# Patient Record
Sex: MALE | Race: WHITE | NOT HISPANIC OR LATINO | Employment: OTHER | URBAN - METROPOLITAN AREA
[De-identification: names, ages, dates, MRNs, and addresses within clinical notes are randomized per-mention and may not be internally consistent; named-entity substitution may affect disease eponyms.]

---

## 2018-12-05 ENCOUNTER — OFFICE VISIT CONVERTED (OUTPATIENT)
Dept: PODIATRY | Facility: CLINIC | Age: 78
End: 2018-12-05
Attending: PODIATRIST

## 2020-04-17 ENCOUNTER — HOSPITAL ENCOUNTER (OUTPATIENT)
Dept: OTHER | Facility: HOSPITAL | Age: 80
Discharge: HOME | End: 2020-04-17
Attending: OTOLARYNGOLOGY

## 2020-04-17 LAB
ANION GAP SERPL CALC-SCNC: 19 MMOL/L (ref 8–19)
APTT BLD: 24.6 S (ref 22.2–34.2)
BASOPHILS # BLD AUTO: 0.08 10*3/UL (ref 0–0.2)
BASOPHILS NFR BLD AUTO: 0.9 % (ref 0–3)
BUN SERPL-MCNC: 30 MG/DL (ref 5–25)
BUN/CREAT SERPL: 19 {RATIO} (ref 6–20)
CALCIUM SERPL-MCNC: 9.6 MG/DL (ref 8.7–10.4)
CHLORIDE SERPL-SCNC: 97 MMOL/L (ref 99–111)
CONV ABS IMM GRAN: 0.03 10*3/UL (ref 0–0.2)
CONV CO2: 27 MMOL/L (ref 22–32)
CONV IMMATURE GRAN: 0.3 % (ref 0–1.8)
CREAT UR-MCNC: 1.6 MG/DL (ref 0.7–1.2)
DEPRECATED RDW RBC AUTO: 54.2 FL (ref 35.1–43.9)
EOSINOPHIL # BLD AUTO: 0.43 10*3/UL (ref 0–0.7)
EOSINOPHIL # BLD AUTO: 4.8 % (ref 0–7)
ERYTHROCYTE [DISTWIDTH] IN BLOOD BY AUTOMATED COUNT: 15.3 % (ref 11.6–14.4)
GFR SERPLBLD BASED ON 1.73 SQ M-ARVRAT: 41 ML/MIN/{1.73_M2}
GLUCOSE SERPL-MCNC: 120 MG/DL (ref 70–99)
HCT VFR BLD AUTO: 45.4 % (ref 42–52)
HGB BLD-MCNC: 14.7 G/DL (ref 14–18)
INR PPP: 0.94 (ref 2–3)
LYMPHOCYTES # BLD AUTO: 1.65 10*3/UL (ref 1–5)
LYMPHOCYTES NFR BLD AUTO: 18.6 % (ref 20–45)
MCH RBC QN AUTO: 31.1 PG (ref 27–31)
MCHC RBC AUTO-ENTMCNC: 32.4 G/DL (ref 33–37)
MCV RBC AUTO: 96.2 FL (ref 80–96)
MONOCYTES # BLD AUTO: 0.84 10*3/UL (ref 0.2–1.2)
MONOCYTES NFR BLD AUTO: 9.5 % (ref 3–10)
NEUTROPHILS # BLD AUTO: 5.84 10*3/UL (ref 2–8)
NEUTROPHILS NFR BLD AUTO: 65.9 % (ref 30–85)
NRBC CBCN: 0 % (ref 0–0.7)
OSMOLALITY SERPL CALC.SUM OF ELEC: 293 MOSM/KG (ref 273–304)
PLATELET # BLD AUTO: 153 10*3/UL (ref 130–400)
PMV BLD AUTO: 10.1 FL (ref 9.4–12.4)
POTASSIUM SERPL-SCNC: 4.6 MMOL/L (ref 3.5–5.3)
PROTHROMBIN TIME: 10.2 S (ref 9.4–12)
RBC # BLD AUTO: 4.72 10*6/UL (ref 4.7–6.1)
SODIUM SERPL-SCNC: 138 MMOL/L (ref 135–147)
T4 FREE SERPL-MCNC: 1.6 NG/DL (ref 0.9–1.8)
TSH SERPL-ACNC: 0.35 M[IU]/L (ref 0.27–4.2)
WBC # BLD AUTO: 8.87 10*3/UL (ref 4.8–10.8)

## 2020-04-24 ENCOUNTER — HOSPITAL ENCOUNTER (OUTPATIENT)
Dept: PERIOP | Facility: HOSPITAL | Age: 80
Setting detail: HOSPITAL OUTPATIENT SURGERY
Discharge: HOME | End: 2020-04-24
Attending: OTOLARYNGOLOGY

## 2020-04-24 LAB
GLUCOSE BLD-MCNC: 107 MG/DL (ref 70–99)
GLUCOSE BLD-MCNC: 97 MG/DL (ref 70–99)

## 2021-01-01 ENCOUNTER — HOSPITAL ENCOUNTER (OUTPATIENT)
Dept: CARDIOLOGY | Facility: HOSPITAL | Age: 81
Discharge: HOME | End: 2021-10-13
Admitting: INTERNAL MEDICINE

## 2021-01-01 ENCOUNTER — OFFICE VISIT (OUTPATIENT)
Dept: INTERNAL MEDICINE | Facility: CLINIC | Age: 81
End: 2021-01-01

## 2021-01-01 ENCOUNTER — CLINICAL SUPPORT (OUTPATIENT)
Dept: INTERNAL MEDICINE | Facility: CLINIC | Age: 81
End: 2021-01-01

## 2021-01-01 ENCOUNTER — TELEPHONE (OUTPATIENT)
Dept: INTERNAL MEDICINE | Facility: CLINIC | Age: 81
End: 2021-01-01

## 2021-01-01 VITALS
HEART RATE: 83 BPM | WEIGHT: 174.2 LBS | TEMPERATURE: 97.8 F | DIASTOLIC BLOOD PRESSURE: 97 MMHG | OXYGEN SATURATION: 88 % | HEIGHT: 65 IN | SYSTOLIC BLOOD PRESSURE: 150 MMHG | BODY MASS INDEX: 29.02 KG/M2

## 2021-01-01 VITALS
OXYGEN SATURATION: 89 % | WEIGHT: 179.9 LBS | BODY MASS INDEX: 28.91 KG/M2 | SYSTOLIC BLOOD PRESSURE: 156 MMHG | HEART RATE: 91 BPM | HEIGHT: 66 IN | TEMPERATURE: 98.2 F | DIASTOLIC BLOOD PRESSURE: 76 MMHG

## 2021-01-01 DIAGNOSIS — I10 ESSENTIAL HYPERTENSION: Primary | ICD-10-CM

## 2021-01-01 DIAGNOSIS — Z23 NEED FOR VACCINATION: ICD-10-CM

## 2021-01-01 DIAGNOSIS — I50.31 ACUTE DIASTOLIC CHF (CONGESTIVE HEART FAILURE) (HCC): ICD-10-CM

## 2021-01-01 DIAGNOSIS — E03.4 HYPOTHYROIDISM DUE TO ACQUIRED ATROPHY OF THYROID: ICD-10-CM

## 2021-01-01 DIAGNOSIS — I10 ESSENTIAL HYPERTENSION: ICD-10-CM

## 2021-01-01 DIAGNOSIS — N18.32 STAGE 3B CHRONIC KIDNEY DISEASE (HCC): ICD-10-CM

## 2021-01-01 DIAGNOSIS — I27.20 PULMONARY HYPERTENSION (HCC): ICD-10-CM

## 2021-01-01 DIAGNOSIS — I48.19 PERSISTENT ATRIAL FIBRILLATION (HCC): ICD-10-CM

## 2021-01-01 DIAGNOSIS — M1A.0790 IDIOPATHIC CHRONIC GOUT OF FOOT WITHOUT TOPHUS, UNSPECIFIED LATERALITY: ICD-10-CM

## 2021-01-01 DIAGNOSIS — R53.83 OTHER FATIGUE: ICD-10-CM

## 2021-01-01 DIAGNOSIS — R09.02 HYPOXIA: Primary | ICD-10-CM

## 2021-01-01 DIAGNOSIS — E78.2 MIXED HYPERLIPIDEMIA: ICD-10-CM

## 2021-01-01 DIAGNOSIS — R73.01 IFG (IMPAIRED FASTING GLUCOSE): ICD-10-CM

## 2021-01-01 DIAGNOSIS — I50.31 ACUTE DIASTOLIC CHF (CONGESTIVE HEART FAILURE) (HCC): Primary | ICD-10-CM

## 2021-01-01 LAB
BH CV ECHO MEAS - AO ROOT DIAM: 2.3 CM
BH CV ECHO MEAS - EF(MOD-BP): 56 %
BH CV ECHO MEAS - IVSD: 1 CM
BH CV ECHO MEAS - LA DIMENSION(2D): 5 CM
BH CV ECHO MEAS - LAT PEAK E' VEL: 6 CM/SEC
BH CV ECHO MEAS - LVIDD: 4.3 CM
BH CV ECHO MEAS - LVIDS: 2.5 CM
BH CV ECHO MEAS - LVPWD: 1 CM
BH CV ECHO MEAS - MED PEAK E' VEL: 8 CM/SEC
BH CV ECHO MEAS - MV A MAX VEL: 37 CM/SEC
BH CV ECHO MEAS - MV DEC TIME: 131 MSEC
BH CV ECHO MEAS - MV E MAX VEL: 87 CM/SEC
BH CV ECHO MEAS - MV E/A: 2.3
BH CV ECHO MEAS - RVSP: 63 MMHG
BH CV ECHO MEASUREMENTS AVERAGE E/E' RATIO: 12.43
IVRT: 61 MSEC
LEFT ATRIUM VOLUME INDEX: 54 ML/M2
MAXIMAL PREDICTED HEART RATE: 141 BPM
STRESS TARGET HR: 120 BPM

## 2021-01-01 PROCEDURE — 99214 OFFICE O/P EST MOD 30 MIN: CPT | Performed by: INTERNAL MEDICINE

## 2021-01-01 PROCEDURE — 93306 TTE W/DOPPLER COMPLETE: CPT

## 2021-01-01 PROCEDURE — G0008 ADMIN INFLUENZA VIRUS VAC: HCPCS | Performed by: INTERNAL MEDICINE

## 2021-01-01 PROCEDURE — 93306 TTE W/DOPPLER COMPLETE: CPT | Performed by: SPECIALIST

## 2021-01-01 PROCEDURE — 90662 IIV NO PRSV INCREASED AG IM: CPT | Performed by: INTERNAL MEDICINE

## 2021-01-01 RX ORDER — LISINOPRIL 40 MG/1
TABLET ORAL
Qty: 90 TABLET | Refills: 0 | Status: SHIPPED | OUTPATIENT
Start: 2021-01-01 | End: 2022-01-01

## 2021-01-01 RX ORDER — ALLOPURINOL 100 MG/1
TABLET ORAL
Qty: 90 TABLET | Refills: 1 | Status: SHIPPED | OUTPATIENT
Start: 2021-01-01 | End: 2022-01-01 | Stop reason: SDUPTHER

## 2021-01-01 RX ORDER — ATENOLOL 25 MG/1
TABLET ORAL
Qty: 90 TABLET | Refills: 0 | OUTPATIENT
Start: 2021-01-01

## 2021-01-01 RX ORDER — ATENOLOL 50 MG/1
50 TABLET ORAL DAILY
Qty: 90 TABLET | Refills: 1 | Status: SHIPPED | OUTPATIENT
Start: 2021-01-01 | End: 2022-01-01

## 2021-01-01 RX ORDER — FUROSEMIDE 40 MG/1
40 TABLET ORAL DAILY
Qty: 90 TABLET | Refills: 0 | Status: SHIPPED | OUTPATIENT
Start: 2021-01-01 | End: 2022-01-01

## 2021-01-01 RX ORDER — ALLOPURINOL 100 MG/1
TABLET ORAL
Qty: 90 TABLET | Refills: 0 | Status: SHIPPED | OUTPATIENT
Start: 2021-01-01 | End: 2021-01-01

## 2021-01-01 RX ORDER — SPIRONOLACTONE 25 MG/1
25 TABLET ORAL DAILY
Qty: 30 TABLET | Refills: 1 | Status: SHIPPED | OUTPATIENT
Start: 2021-01-01 | End: 2022-01-01

## 2021-01-01 RX ORDER — AMLODIPINE BESYLATE 10 MG/1
TABLET ORAL
COMMUNITY
Start: 2021-09-03 | End: 2021-01-01

## 2021-05-15 VITALS
HEART RATE: 60 BPM | HEIGHT: 65 IN | SYSTOLIC BLOOD PRESSURE: 189 MMHG | WEIGHT: 194.25 LBS | OXYGEN SATURATION: 97 % | BODY MASS INDEX: 32.36 KG/M2 | DIASTOLIC BLOOD PRESSURE: 91 MMHG

## 2021-07-20 RX ORDER — ATENOLOL 25 MG/1
TABLET ORAL
Qty: 90 TABLET | Refills: 0 | Status: SHIPPED | OUTPATIENT
Start: 2021-07-20 | End: 2021-01-01 | Stop reason: SDUPTHER

## 2021-07-20 NOTE — TELEPHONE ENCOUNTER
Please only fill medications for a maximum of 90 days.  No refills on 90-day scripts.  2 refills on monthly scripts.  Thanks

## 2021-08-30 PROBLEM — C14.0 MALIGNANT NEOPLASM OF EAR, NOSE, AND THROAT: Status: ACTIVE | Noted: 2021-08-30

## 2021-08-30 PROBLEM — E78.2 MIXED HYPERLIPIDEMIA: Status: ACTIVE | Noted: 2021-08-30

## 2021-08-30 PROBLEM — M1A.0790 IDIOPATHIC CHRONIC GOUT OF FOOT WITHOUT TOPHUS: Status: ACTIVE | Noted: 2021-08-30

## 2021-08-30 PROBLEM — C44.201 MALIGNANT NEOPLASM OF EAR, NOSE, AND THROAT: Status: ACTIVE | Noted: 2021-08-30

## 2021-08-30 PROBLEM — B35.1 TINEA UNGUIUM: Status: ACTIVE | Noted: 2018-12-05

## 2021-08-30 PROBLEM — R73.01 IFG (IMPAIRED FASTING GLUCOSE): Status: ACTIVE | Noted: 2021-08-30

## 2021-08-30 PROBLEM — I10 ESSENTIAL HYPERTENSION: Status: ACTIVE | Noted: 2021-08-30

## 2021-08-30 PROBLEM — C76.0 MALIGNANT NEOPLASM OF EAR, NOSE, AND THROAT: Status: ACTIVE | Noted: 2021-08-30

## 2021-08-30 PROBLEM — N18.32 STAGE 3B CHRONIC KIDNEY DISEASE: Status: ACTIVE | Noted: 2021-08-30

## 2021-08-30 PROBLEM — E53.8 VITAMIN B12 DEFICIENCY: Status: ACTIVE | Noted: 2021-08-30

## 2021-08-30 PROBLEM — I10 HYPERTENSION: Status: ACTIVE | Noted: 2021-08-30

## 2021-08-30 PROBLEM — E03.4 HYPOTHYROIDISM DUE TO ACQUIRED ATROPHY OF THYROID: Status: ACTIVE | Noted: 2021-08-30

## 2021-09-03 ENCOUNTER — OFFICE VISIT (OUTPATIENT)
Dept: INTERNAL MEDICINE | Facility: CLINIC | Age: 81
End: 2021-09-03

## 2021-09-03 VITALS
WEIGHT: 190.8 LBS | BODY MASS INDEX: 31.79 KG/M2 | OXYGEN SATURATION: 97 % | TEMPERATURE: 97.3 F | SYSTOLIC BLOOD PRESSURE: 170 MMHG | HEART RATE: 85 BPM | DIASTOLIC BLOOD PRESSURE: 93 MMHG | HEIGHT: 65 IN

## 2021-09-03 DIAGNOSIS — E78.2 MIXED HYPERLIPIDEMIA: ICD-10-CM

## 2021-09-03 DIAGNOSIS — I10 ESSENTIAL HYPERTENSION: Primary | ICD-10-CM

## 2021-09-03 DIAGNOSIS — E03.4 HYPOTHYROIDISM DUE TO ACQUIRED ATROPHY OF THYROID: ICD-10-CM

## 2021-09-03 DIAGNOSIS — R53.83 OTHER FATIGUE: ICD-10-CM

## 2021-09-03 DIAGNOSIS — I50.31 ACUTE DIASTOLIC CHF (CONGESTIVE HEART FAILURE) (HCC): ICD-10-CM

## 2021-09-03 DIAGNOSIS — I48.19 PERSISTENT ATRIAL FIBRILLATION (HCC): ICD-10-CM

## 2021-09-03 DIAGNOSIS — M1A.0790 IDIOPATHIC CHRONIC GOUT OF FOOT WITHOUT TOPHUS, UNSPECIFIED LATERALITY: ICD-10-CM

## 2021-09-03 DIAGNOSIS — R73.01 IFG (IMPAIRED FASTING GLUCOSE): ICD-10-CM

## 2021-09-03 DIAGNOSIS — N18.32 STAGE 3B CHRONIC KIDNEY DISEASE (HCC): ICD-10-CM

## 2021-09-03 PROCEDURE — 99214 OFFICE O/P EST MOD 30 MIN: CPT | Performed by: INTERNAL MEDICINE

## 2021-09-03 PROCEDURE — 93000 ELECTROCARDIOGRAM COMPLETE: CPT | Performed by: INTERNAL MEDICINE

## 2021-09-03 RX ORDER — AMLODIPINE BESYLATE 10 MG/1
10 TABLET ORAL DAILY
Qty: 90 TABLET | Refills: 1 | Status: SHIPPED | OUTPATIENT
Start: 2021-09-03 | End: 2021-09-03

## 2021-09-03 RX ORDER — LEVOTHYROXINE SODIUM 100 MCG
100 TABLET ORAL DAILY
COMMUNITY
Start: 2021-06-21 | End: 2022-01-01 | Stop reason: SDUPTHER

## 2021-09-03 RX ORDER — FUROSEMIDE 40 MG/1
40 TABLET ORAL DAILY
Qty: 30 TABLET | Refills: 1 | Status: SHIPPED | OUTPATIENT
Start: 2021-09-03 | End: 2021-01-01 | Stop reason: SDUPTHER

## 2021-09-03 RX ORDER — POTASSIUM CHLORIDE 750 MG/1
10 TABLET, FILM COATED, EXTENDED RELEASE ORAL DAILY
Qty: 30 TABLET | Refills: 1 | Status: SHIPPED | OUTPATIENT
Start: 2021-09-03 | End: 2022-01-01

## 2021-09-03 RX ORDER — LISINOPRIL 40 MG/1
TABLET ORAL
COMMUNITY
Start: 2021-07-14 | End: 2021-01-01

## 2021-09-03 RX ORDER — ALLOPURINOL 100 MG/1
TABLET ORAL
COMMUNITY
Start: 2021-05-27 | End: 2021-01-01

## 2021-09-03 RX ORDER — PRAVASTATIN SODIUM 20 MG
20 TABLET ORAL DAILY
COMMUNITY
Start: 2021-08-19 | End: 2022-01-01

## 2021-09-10 ENCOUNTER — TELEPHONE (OUTPATIENT)
Dept: INTERNAL MEDICINE | Facility: CLINIC | Age: 81
End: 2021-09-10

## 2021-09-10 NOTE — TELEPHONE ENCOUNTER
Pt couldn't get in for ECHO until 10/13, he was to come back today to see you, but moved his appt out until after the test.

## 2021-10-20 NOTE — PROGRESS NOTES
"Chief Complaint  Hypertension and Follow-up (Recently had ECHO, wants results)    Subjective          Tres Jackson presents to [facility]     Hypertension  Associated symptoms include shortness of breath. Pertinent negatives include no blurred vision, chest pain or palpitations.       Patient is a 80-year-old male with underlying hypertension, hyperlipidemia, and resultant chronic kidney disease stage IIIb to name a few, who is here for routine 3-month follow-up.  It looks like he has a few new issues, we will address these, and review his labs as well.---> Patient is here as of 10/20/2021 to follow-up the echo and follow-up labs in relation to medication changes last office visit.    Review of Systems   Constitutional: Positive for fatigue. Negative for appetite change and fever.   HENT: Negative for congestion and ear pain.    Eyes: Negative for blurred vision.   Respiratory: Positive for shortness of breath. Negative for cough, chest tightness and wheezing.    Cardiovascular: Positive for leg swelling. Negative for chest pain and palpitations.   Gastrointestinal: Negative for abdominal pain.   Genitourinary: Negative for difficulty urinating, dysuria and hematuria.   Musculoskeletal: Negative for arthralgias and gait problem.   Skin: Negative for skin lesions.   Neurological: Negative for syncope, memory problem and confusion.   Psychiatric/Behavioral: Negative for self-injury and depressed mood.       Objective   Vital Signs:   /76   Pulse 91   Temp 97.9 °F (36.6 °C)   Ht 167.6 cm (66\")   Wt 79.4 kg (175 lb)   SpO2 (!) 89%   BMI 28.25 kg/m²           Physical Exam  Vitals and nursing note reviewed.   Constitutional:       General: He is not in acute distress.     Appearance: Normal appearance. He is not toxic-appearing.   HENT:      Head: Atraumatic.      Right Ear: External ear normal.      Left Ear: External ear normal.      Nose: Nose normal.      " Mouth/Throat:      Mouth: Mucous membranes are moist.   Eyes:      General:         Right eye: No discharge.         Left eye: No discharge.      Extraocular Movements: Extraocular movements intact.      Pupils: Pupils are equal, round, and reactive to light.   Neck:      Comments: Chronic trach.  Cardiovascular:      Rate and Rhythm: Normal rate. Rhythm irregular.      Pulses: Normal pulses.      Heart sounds: Normal heart sounds. No murmur heard.  No gallop.       Comments: Irregularly irregular, no S3.  Pulmonary:      Effort: Pulmonary effort is normal. No respiratory distress.      Breath sounds: No wheezing, rhonchi or rales.      Comments: Decreased breath sounds, no rales.  Abdominal:      General: There is no distension.      Palpations: Abdomen is soft. There is no mass.      Tenderness: There is no abdominal tenderness. There is no guarding.   Musculoskeletal:         General: No swelling or tenderness.      Cervical back: No tenderness.      Right lower leg: Edema present.      Left lower leg: Edema present.      Comments: 2-3+ bilateral lower extremity edema up to the knees.---> Edema improved 1+ as of 10/21 off visit.   Skin:     General: Skin is warm and dry.      Findings: No rash.   Neurological:      General: No focal deficit present.      Mental Status: He is alert and oriented to person, place, and time. Mental status is at baseline.      Motor: No weakness.      Gait: Gait normal.   Psychiatric:         Mood and Affect: Mood normal.         Thought Content: Thought content normal.          Result Review :   The following data was reviewed by: Erick Pedroza MD on 09/03/2021:                  Assessment and Plan    Diagnoses and all orders for this visit:    1. Acute diastolic CHF (congestive heart failure) (HCC) (Primary)  Overview:  This is a new issue as of his September 2021 appointment.  Hopefully it simply related to the apparent atrial fibrillation.  We will need to start diuretics, get an  echo, and follow him up very shortly.---> Echo 10/21 with normal EF, no concerning valve changes, he does have significant pulmonary hypertension.  Continue with current dose of Lasix, get labs as noted, should be okay to titrate his Tenormin.    Orders:  -     BNP; Future  -     Basic Metabolic Panel; Future  -     Basic Metabolic Panel; Future  -     BNP; Future    2. Essential hypertension  Overview:  Blood pressure is up the past 2 office visits, so we need to increase his medications.  I was going to titrate his Tenormin, but on exam he is noted to be in acute heart failure likely secondary to new onset A. fib.  We will see where his blood pressure goes with diuresis, but probably will end up switching him to Coreg and titrating this.  Just one make 1 medication change at a time with him.---> Will titrate Tenormin for heart rate and blood pressure control as of 10/21 office.      3. Hypothyroidism due to acquired atrophy of thyroid  Overview:  TSH was fine in June 2021.  Patient is stable on levothyroxine 100 mcg daily.  Continue same with level on return to office.---> Repeat this as of 10/21 office visit given his new onset A. fib.    Orders:  -     TSH; Future  -     T4, free; Future    4. Persistent atrial fibrillation (HCC)  Overview:  This appears to be a new issue = yes, EKG done today 9/3/2021 to evaluate the patient's irregular heartbeat noted on exam confirms this.  The EKG reveals new onset A. 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  Additionally there is an age-indeterminate high lateral MI with new changes noted in leads I and aVL compared with the prior.  There are no active acute ischemic changes identified.  Plan per orders with anticoagulation, diuresis, and close follow-up.---> Patient is echo with no obvious wall motion abnormalities, so we will address better rate control with his Tenormin and defer changes to Coreg at this time at least.   Continue with Eliquis for anticoagulation.      5. Stage 3b chronic kidney disease (HCC)  Overview:  34% = still looks dry and I d/w him in detail need to see Renal if no better on RTO; I d/w stop nsaids please...GFR is 49 as of his September 2021 appointment.  This is a nice improvement, he does not need to see the nephrologist now, I will continue to monitor this.---> Need labs on Lasix as of 10/21 office visit.          Other orders  -     atenolol (TENORMIN) 50 MG tablet; Take 1 tablet by mouth Daily.  Dispense: 90 tablet; Refill: 1    --  --  OLDER NOTES:  (D/W HIM ON RTO GERD/STOMACH ISSUES THAT WIFE D/W ME ON MONDAY---> I d/w him 3/21 and he denies it; will repeat CBC on RTO)  --I have called pharmacies in [facility] and they have no record of Prevnar, I have called pt's wife and she feels that he has not received it yet. MB---> I will address this after Covid shots done.    VISIT 6/21:  ANNUAL MEDICARE WELLNESS PHYSICAL 8/20 OV=forms reviewed in room with pt; no new issues raised.  --  HTN well controlled...but f/u off HCTZ...as above; will try increasing ACEI, but may need another agent on RTO...improved...up and down at home...pt here a year later and needs increase meds 8/20...some better 11/20 despite intol to Norvasc 5 mg=edema; will use another later if BP trends higher... stable 3/21---> is up 6/21, so increase meds if same on RTO.  CKD3 stable...35/1.4 (59%)...60/2.0, not drinking as well, some loose stools, no pains/no hematuria/etc, so will just lose the HCTZ for now...25/1.3...43/1.7 is likely related to being dry, so ok to stay on low dose nsaid=1/2 of 600 mg, but will check sooner...26/1.3 is back to baseline...54%...49%...53%...42% is after not being seen past year as of 8/20 OV...50% is nice to see 11/20...43% is ok for now 3/21---> 34% = still looks dry and I d/w him in detail need to see Renal if no better on RTO; I d/w stop nsaids please.  --  DM well controlled and he's comfortable being on  actos...remains well controlled...6.1...5.7 is ok since no lows, no sweats/etc = ditto...same=5.8 with no lows as of 8/20 OV...5.5 and will stop actos now as of 11/20...5.6 off it is great---> 6.1 is fine 6/21.  THYROID stable on 75 qd...stable 9/16 = 2.6...7.2, so increase to 100...1.9 is great...fine 9/18...wnl 4/19---> fine 6/21.  --  CAD with no ischemia...appears stable, but does have some fatigue c/o at 3/18 OV, so will consider SPECT if persists---> no CP/SOB 8/20.  LIPIDS at goal=LDL 78---> 97 is fine 6/21.  --  H/N CA (9/08) s/p laryngectomy and XRT...per Dr Maya q 6 mo.  SKIN CANCER=RUE per Dr Maya; had PET for this and above as of 4/17 OV=neg per report---> seeing Dr Rivera q 2-3 months as of 8/19 OV.  --  GOUT with no recent flare...6.5...6.4---> 6 in 11/20.  VIT B12 DEF=9/18 = 1K OTC to start---> 1500.  --  --  PSA 0.4 on 4/17/17.  COLON not rec given age and no sx's.  Havrix-Hep A 8/18, Shingles 2019; COVID x1 as of 6/21 OV=Pfizer.  Prevnar rec 9/17; rec Pneumovax if he got prevnar as of 1/19 OV, but he didn't, so go get it now...he can't say 4/19 = we will call; d/w COVID shot needed 3/21.  Flu shot for 2021 season given at his 10/21 office visit.  ([place] about being on water alone=trach; Jade/Jamison both here in town).    Follow Up   Return in about 4 weeks (around 11/17/2021).  Patient was given instructions and counseling regarding his condition or for health maintenance advice. Please see specific information pulled into the AVS if appropriate.

## 2021-11-03 NOTE — TELEPHONE ENCOUNTER
----- Message from Erick Pedroza MD sent at 11/3/2021 12:26 AM EDT -----  Also, I sent an additional water pill to his pharmacy, [facility], so take it in addition to the lasix.

## 2021-11-21 PROBLEM — M1A.0790 IDIOPATHIC CHRONIC GOUT OF FOOT WITHOUT TOPHUS: Status: ACTIVE | Noted: 2021-01-01

## 2021-11-21 NOTE — PROGRESS NOTES
"Chief Complaint  Hypertension and Follow-up (He states that he doesn't have any energy)    Subjective          Tres Jackson presents to [facility]     History of present illness:    Patient is a 80-year-old male with underlying hypertension, hyperlipidemia, and resultant chronic kidney disease stage 3b, and recent diagnosis of congestive heart failure, who is coming in 11/21 for 1 month follow-up.  He had Aldactone added to his regimen since he was here last.  We will review his labs and address any new concerns.     Review of Systems   Constitutional: Positive for fatigue. Negative for appetite change and fever.   HENT: Negative for congestion and ear pain.    Eyes: Negative for blurred vision.   Respiratory: Positive for shortness of breath. Negative for cough, chest tightness and wheezing.    Cardiovascular: Positive for leg swelling. Negative for chest pain and palpitations.   Gastrointestinal: Negative for abdominal pain.   Genitourinary: Negative for difficulty urinating, dysuria and hematuria.   Musculoskeletal: Negative for arthralgias and gait problem.   Skin: Negative for skin lesions.   Neurological: Negative for syncope, memory problem and confusion.   Psychiatric/Behavioral: Negative for self-injury and depressed mood.       Objective   Vital Signs:   /97   Pulse 83   Temp 97.8 °F (36.6 °C)   Ht 165.1 cm (65\")   Wt 79 kg (174 lb 3.2 oz)   SpO2 (!) 88%   BMI 28.99 kg/m²           Physical Exam  Vitals and nursing note reviewed.   Constitutional:       General: He is not in acute distress.     Appearance: Normal appearance. He is not toxic-appearing.   HENT:      Head: Atraumatic.      Right Ear: External ear normal.      Left Ear: External ear normal.      Nose: Nose normal.      Mouth/Throat:      Mouth: Mucous membranes are moist.   Eyes:      General:         Right eye: No discharge.         Left eye: No discharge.      Extraocular Movements: " Extraocular movements intact.      Pupils: Pupils are equal, round, and reactive to light.   Neck:      Comments: Chronic trach.  Cardiovascular:      Rate and Rhythm: Normal rate. Rhythm irregular.      Pulses: Normal pulses.      Heart sounds: Normal heart sounds. No murmur heard.  No gallop.       Comments: Irregularly irregular, no S3.  Pulmonary:      Effort: Pulmonary effort is normal. No respiratory distress.      Breath sounds: No wheezing, rhonchi or rales.      Comments: Decreased breath sounds, no rales = ditto 11/21 OV.  Abdominal:      General: There is no distension.      Palpations: Abdomen is soft. There is no mass.      Tenderness: There is no abdominal tenderness. There is no guarding.   Musculoskeletal:         General: No swelling or tenderness.      Cervical back: No tenderness.      Right lower leg: Edema present.      Left lower leg: Edema present.      Comments: 2-3+ bilateral lower extremity edema up to the knees... Edema improved 1+ as of 10/21 off visit---> this level edema is stable as of 11/21 OV.   Skin:     General: Skin is warm and dry.      Findings: No rash.   Neurological:      General: No focal deficit present.      Mental Status: He is alert and oriented to person, place, and time. Mental status is at baseline.      Motor: No weakness.      Gait: Gait normal.   Psychiatric:         Mood and Affect: Mood normal.         Thought Content: Thought content normal.          Result Review :   The following data was reviewed by: Erick Pedroza MD on 09/03/2021:           Assessment and Plan    Diagnoses and all orders for this visit:    1. Essential hypertension (Primary)  Overview:  Blood pressure is up the past 2 office visits, so we need to increase his medications.  I was going to titrate his Tenormin, but on exam he is noted to be in acute heart failure likely secondary to new onset A. fib.  We will see where his blood pressure goes with diuresis, but probably will end up switching  him to Coreg and titrating this.  Just one make 1 medication change at a time with him...Will titrate Tenormin for heart rate and blood pressure control as of 10/21 office---> hopefully blood pressure spuriously elevated as of 11/21 OV.  Need to see his labs for can titrate his medication.  May need to resume lower dose amlodipine.    Orders:  -     Comprehensive Metabolic Panel; Future  -     Lipid Panel; Future  -     BNP; Future  -     TSH; Future  -     T4, free; Future  -     Basic Metabolic Panel; Future  -     BNP; Future  -     Hemoglobin A1c; Future  -     CBC & Differential; Future  -     Uric Acid; Future    2. Mixed hyperlipidemia  Overview:  LDL was 97 in June 2021.  Patient is stable on low-dose pravastatin, continue same, repeat level return to office after the new year.    Orders:  -     Comprehensive Metabolic Panel; Future  -     Lipid Panel; Future  -     BNP; Future  -     TSH; Future  -     T4, free; Future  -     Basic Metabolic Panel; Future  -     BNP; Future  -     Hemoglobin A1c; Future  -     CBC & Differential; Future  -     Uric Acid; Future    3. IFG (impaired fasting glucose)  Overview:  A1c was 6.1 in June 2021.  His fasting sugar is very reasonable as of 10/21 OV.  We will repeat A1c after the new year.    Orders:  -     Comprehensive Metabolic Panel; Future  -     Lipid Panel; Future  -     BNP; Future  -     TSH; Future  -     T4, free; Future  -     Basic Metabolic Panel; Future  -     BNP; Future  -     Hemoglobin A1c; Future  -     CBC & Differential; Future  -     Uric Acid; Future    4. Hypothyroidism due to acquired atrophy of thyroid  Overview:  TSH was fine in June 2021.  Patient is stable on levothyroxine 100 mcg daily.  Continue same with level on return to office.---> TSH was well normal labs done after 10/21 OV, so patient stable to continue current dose of Synthroid 100 mcg daily.    Orders:  -     Comprehensive Metabolic Panel; Future  -     Lipid Panel; Future  -      BNP; Future  -     TSH; Future  -     T4, free; Future  -     Basic Metabolic Panel; Future  -     BNP; Future  -     Hemoglobin A1c; Future  -     CBC & Differential; Future  -     Uric Acid; Future    5. Stage 3b chronic kidney disease (HCC)  Overview:  34% = still looks dry and I d/w him in detail need to see Renal if no better on RTO; I d/w stop nsaids please...GFR is 49 as of his September 2021 appointment.  This is a nice improvement, he does not need to see the nephrologist now, I will continue to monitor this.---> GFR was down a bit to 30 on those labs after 10/21 OV.  Corresponding BUN/creatinine were 29 and 2.0.  Need labs soon as of 11/21 OV.      Orders:  -     Comprehensive Metabolic Panel; Future  -     Lipid Panel; Future  -     BNP; Future  -     TSH; Future  -     T4, free; Future  -     Basic Metabolic Panel; Future  -     BNP; Future  -     Hemoglobin A1c; Future  -     CBC & Differential; Future  -     Uric Acid; Future    6. Idiopathic chronic gout of foot without tophus, unspecified laterality  Overview:  Patient stable on low-dose allopurinol.  No recent flares as of 11/21.  Level on return to office after the new year.    Orders:  -     Comprehensive Metabolic Panel; Future  -     Lipid Panel; Future  -     BNP; Future  -     TSH; Future  -     T4, free; Future  -     Basic Metabolic Panel; Future  -     BNP; Future  -     Hemoglobin A1c; Future  -     CBC & Differential; Future  -     Uric Acid; Future    7. Other fatigue  Overview:  Will get CBC on return to office after the new year.      8. Persistent atrial fibrillation (HCC)  Overview:  This appears to be a new issue = yes, EKG done today 9/3/2021 to evaluate the patient's irregular heartbeat noted on exam confirms this.  The EKG reveals new onset A. 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  Additionally there is an age-indeterminate high lateral MI with new changes noted in leads I and aVL  compared with the prior.  There are no active acute ischemic changes identified.  Plan per orders with anticoagulation, diuresis, and close follow-up.---> Patient's echo with no obvious wall motion abnormalities, so we will address better rate control with his Tenormin and defer changes to Coreg at this time at least.  Continue with Eliquis for anticoagulation---> continue same meds as of 11/21 OV.  Patient is rate controlled this OV.      9. Acute diastolic CHF (congestive heart failure) (HCC)  Overview:  This is a new issue as of his September 2021 appointment.  Hopefully it simply related to the apparent atrial fibrillation.  We will need to start diuretics, get an echo, and follow him up very shortly...Echo 10/21 with normal EF, no concerning valve changes, he does have significant pulmonary hypertension.  Continue with current dose of Lasix, get labs as noted, should be okay to titrate his Tenormin---> BNP was 9500 following his 10/21 OV.  BUN/creatinine had trended up some, so need to see results soon as of 11/21 before can make further medication recommendations.    Orders:  -     Comprehensive Metabolic Panel; Future  -     Lipid Panel; Future  -     BNP; Future  -     TSH; Future  -     T4, free; Future  -     Basic Metabolic Panel; Future  -     BNP; Future  -     Hemoglobin A1c; Future  -     CBC & Differential; Future  -     Uric Acid; Future    10. Pulmonary hypertension (HCC)  Overview:  This was noted on the echo.  We are trying to get overnight O2 sats.  Still have them as of 11/21.  We will investigate.      --  --  OLDER NOTES:  (D/W HIM ON RTO GERD/STOMACH ISSUES THAT WIFE D/W ME ON MONDAY---> I d/w him 3/21 and he denies it; will repeat CBC on RTO)  --I have called pharmacies in town and they have no record of Prevnar, I have called pt's wife and she feels that he has not received it yet. MB---> I will address this after Covid shots done.    VISIT 6/21:  ANNUAL MEDICARE WELLNESS PHYSICAL 8/20  OV=forms reviewed in room with pt; no new issues raised.  --  HTN well controlled...but f/u off HCTZ...as above; will try increasing ACEI, but may need another agent on RTO...improved...up and down at home...pt here a year later and needs increase meds 8/20...some better 11/20 despite intol to Norvasc 5 mg=edema; will use another later if BP trends higher... stable 3/21---> is up 6/21, so increase meds if same on RTO.  CKD3 stable...35/1.4 (59%)...60/2.0, not drinking as well, some loose stools, no pains/no hematuria/etc, so will just lose the HCTZ for now...25/1.3...43/1.7 is likely related to being dry, so ok to stay on low dose nsaid=1/2 of 600 mg, but will check sooner...26/1.3 is back to baseline...54%...49%...53%...42% is after not being seen past year as of 8/20 OV...50% is nice to see 11/20...43% is ok for now 3/21---> 34% = still looks dry and I d/w him in detail need to see Renal if no better on RTO; I d/w stop nsaids please.  --  DM well controlled and he's comfortable being on actos...remains well controlled...6.1...5.7 is ok since no lows, no sweats/etc = ditto...same=5.8 with no lows as of 8/20 OV...5.5 and will stop actos now as of 11/20...5.6 off it is great---> 6.1 is fine 6/21.  THYROID stable on 75 qd...stable 9/16 = 2.6...7.2, so increase to 100...1.9 is great...fine 9/18...wnl 4/19---> fine 6/21.  --  CAD with no ischemia...appears stable, but does have some fatigue c/o at 3/18 OV, so will consider SPECT if persists---> no CP/SOB 8/20.  LIPIDS at goal=LDL 78---> 97 is fine 6/21.  --  H/N CA (9/08) s/p laryngectomy and XRT...per Dr Maya q 6 mo.  SKIN CANCER=RUE per Dr Maya; had PET for this and above as of 4/17 OV=neg per report---> seeing Dr Rivera q 2-3 months as of 8/19 OV.  --  GOUT with no recent flare...6.5...6.4---> 6 in 11/20.  VIT B12 DEF=9/18 = 1K OTC to start---> 1500.  --  --  PSA 0.4 on 4/17/17.  COLON not rec given age and no sx's.  Havrix-Hep A 8/18, Shingles 2019; COVID x1 as of 6/21  OV=Pfizer.  Prevnar rec 9/17; rec Pneumovax if he got prevnar as of 1/19 OV, but he didn't, so go get it now...he can't say 4/19 = we will call; d/w COVID shot needed 3/21.  Flu shot for 2021 season given at his 10/21 office visit.  ([place] being on water alone=trach; Jade/Jamison both here in town).    Follow Up   Return in about 3 months (around 2/22/2022).  Patient was given instructions and counseling regarding his condition or for health maintenance advice. Please see specific information pulled into the AVS if appropriate.

## 2021-11-22 PROBLEM — I27.20 PULMONARY HYPERTENSION (HCC): Status: ACTIVE | Noted: 2021-01-01

## 2022-01-01 ENCOUNTER — OFFICE VISIT (OUTPATIENT)
Dept: PODIATRY | Facility: CLINIC | Age: 82
End: 2022-01-01

## 2022-01-01 ENCOUNTER — HOSPITAL ENCOUNTER (EMERGENCY)
Facility: HOSPITAL | Age: 82
Discharge: ANOTHER HOSPITAL | End: 2022-08-18
Attending: EMERGENCY MEDICINE | Admitting: EMERGENCY MEDICINE

## 2022-01-01 ENCOUNTER — HOSPITAL ENCOUNTER (INPATIENT)
Facility: HOSPITAL | Age: 82
LOS: 9 days | Discharge: HOME HEALTH | End: 2022-02-09
Attending: EMERGENCY MEDICINE | Admitting: INTERNAL MEDICINE

## 2022-01-01 ENCOUNTER — OFFICE VISIT (OUTPATIENT)
Dept: ORTHOPEDIC SURGERY | Facility: CLINIC | Age: 82
End: 2022-01-01

## 2022-01-01 ENCOUNTER — APPOINTMENT (OUTPATIENT)
Dept: GENERAL RADIOLOGY | Facility: HOSPITAL | Age: 82
End: 2022-01-01

## 2022-01-01 ENCOUNTER — PREP FOR SURGERY (OUTPATIENT)
Dept: OTHER | Facility: HOSPITAL | Age: 82
End: 2022-01-01

## 2022-01-01 ENCOUNTER — APPOINTMENT (OUTPATIENT)
Dept: CT IMAGING | Facility: HOSPITAL | Age: 82
End: 2022-01-01

## 2022-01-01 ENCOUNTER — TRANSITIONAL CARE MANAGEMENT TELEPHONE ENCOUNTER (OUTPATIENT)
Dept: CALL CENTER | Facility: HOSPITAL | Age: 82
End: 2022-01-01

## 2022-01-01 ENCOUNTER — LAB (OUTPATIENT)
Dept: LAB | Facility: HOSPITAL | Age: 82
End: 2022-01-01

## 2022-01-01 ENCOUNTER — HOSPITAL ENCOUNTER (OUTPATIENT)
Dept: GENERAL RADIOLOGY | Facility: HOSPITAL | Age: 82
Discharge: HOME | End: 2022-02-24
Admitting: INTERNAL MEDICINE

## 2022-01-01 ENCOUNTER — OFFICE VISIT (OUTPATIENT)
Dept: INTERNAL MEDICINE | Facility: CLINIC | Age: 82
End: 2022-01-01

## 2022-01-01 ENCOUNTER — TELEPHONE (OUTPATIENT)
Dept: INTERNAL MEDICINE | Facility: CLINIC | Age: 82
End: 2022-01-01

## 2022-01-01 ENCOUNTER — READMISSION MANAGEMENT (OUTPATIENT)
Dept: CALL CENTER | Facility: HOSPITAL | Age: 82
End: 2022-01-01

## 2022-01-01 ENCOUNTER — HOSPITAL ENCOUNTER (OUTPATIENT)
Dept: INFUSION THERAPY | Facility: HOSPITAL | Age: 82
Discharge: HOME | End: 2022-03-16
Attending: INTERNAL MEDICINE | Admitting: INTERNAL MEDICINE

## 2022-01-01 ENCOUNTER — TELEPHONE (OUTPATIENT)
Dept: PULMONOLOGY | Facility: CLINIC | Age: 82
End: 2022-01-01

## 2022-01-01 ENCOUNTER — OFFICE VISIT (OUTPATIENT)
Dept: CARDIOLOGY | Facility: CLINIC | Age: 82
End: 2022-01-01

## 2022-01-01 ENCOUNTER — OFFICE VISIT (OUTPATIENT)
Dept: PULMONOLOGY | Facility: CLINIC | Age: 82
End: 2022-01-01

## 2022-01-01 VITALS
WEIGHT: 158 LBS | SYSTOLIC BLOOD PRESSURE: 134 MMHG | BODY MASS INDEX: 26.33 KG/M2 | HEART RATE: 73 BPM | HEIGHT: 65 IN | OXYGEN SATURATION: 94 % | TEMPERATURE: 97.5 F | DIASTOLIC BLOOD PRESSURE: 70 MMHG

## 2022-01-01 VITALS
OXYGEN SATURATION: 90 % | DIASTOLIC BLOOD PRESSURE: 51 MMHG | TEMPERATURE: 97.3 F | HEART RATE: 66 BPM | BODY MASS INDEX: 25.96 KG/M2 | WEIGHT: 155.8 LBS | SYSTOLIC BLOOD PRESSURE: 81 MMHG | HEIGHT: 65 IN

## 2022-01-01 VITALS
RESPIRATION RATE: 16 BRPM | TEMPERATURE: 98.1 F | HEIGHT: 66 IN | SYSTOLIC BLOOD PRESSURE: 117 MMHG | DIASTOLIC BLOOD PRESSURE: 72 MMHG | WEIGHT: 158.73 LBS | HEART RATE: 84 BPM | BODY MASS INDEX: 25.51 KG/M2 | OXYGEN SATURATION: 94 %

## 2022-01-01 VITALS
HEART RATE: 79 BPM | WEIGHT: 141.54 LBS | HEIGHT: 65 IN | OXYGEN SATURATION: 95 % | TEMPERATURE: 98.9 F | BODY MASS INDEX: 23.58 KG/M2 | RESPIRATION RATE: 18 BRPM | DIASTOLIC BLOOD PRESSURE: 99 MMHG | SYSTOLIC BLOOD PRESSURE: 152 MMHG

## 2022-01-01 VITALS
WEIGHT: 165 LBS | HEIGHT: 65 IN | DIASTOLIC BLOOD PRESSURE: 68 MMHG | BODY MASS INDEX: 27.49 KG/M2 | SYSTOLIC BLOOD PRESSURE: 123 MMHG | HEART RATE: 77 BPM

## 2022-01-01 VITALS
HEART RATE: 67 BPM | WEIGHT: 158.8 LBS | SYSTOLIC BLOOD PRESSURE: 113 MMHG | OXYGEN SATURATION: 98 % | HEIGHT: 65 IN | DIASTOLIC BLOOD PRESSURE: 78 MMHG | TEMPERATURE: 97.3 F | BODY MASS INDEX: 26.46 KG/M2

## 2022-01-01 VITALS
OXYGEN SATURATION: 85 % | HEIGHT: 65 IN | SYSTOLIC BLOOD PRESSURE: 130 MMHG | DIASTOLIC BLOOD PRESSURE: 90 MMHG | BODY MASS INDEX: 28.29 KG/M2 | WEIGHT: 169.8 LBS | HEART RATE: 90 BPM | TEMPERATURE: 98.2 F

## 2022-01-01 VITALS — HEART RATE: 41 BPM | WEIGHT: 155 LBS | OXYGEN SATURATION: 92 % | HEIGHT: 65 IN | BODY MASS INDEX: 25.83 KG/M2

## 2022-01-01 VITALS
SYSTOLIC BLOOD PRESSURE: 116 MMHG | HEART RATE: 87 BPM | HEIGHT: 65 IN | BODY MASS INDEX: 24.99 KG/M2 | OXYGEN SATURATION: 94 % | RESPIRATION RATE: 18 BRPM | DIASTOLIC BLOOD PRESSURE: 84 MMHG | WEIGHT: 150 LBS | TEMPERATURE: 97.5 F

## 2022-01-01 VITALS
WEIGHT: 160 LBS | TEMPERATURE: 97.8 F | HEART RATE: 91 BPM | RESPIRATION RATE: 18 BRPM | SYSTOLIC BLOOD PRESSURE: 128 MMHG | HEIGHT: 64 IN | DIASTOLIC BLOOD PRESSURE: 77 MMHG | BODY MASS INDEX: 27.31 KG/M2 | OXYGEN SATURATION: 97 %

## 2022-01-01 VITALS
BODY MASS INDEX: 25.19 KG/M2 | HEIGHT: 65 IN | WEIGHT: 151.2 LBS | DIASTOLIC BLOOD PRESSURE: 77 MMHG | SYSTOLIC BLOOD PRESSURE: 114 MMHG | TEMPERATURE: 96.7 F | OXYGEN SATURATION: 95 % | HEART RATE: 71 BPM

## 2022-01-01 VITALS
HEART RATE: 109 BPM | BODY MASS INDEX: 24.39 KG/M2 | OXYGEN SATURATION: 79 % | HEIGHT: 65 IN | WEIGHT: 146.4 LBS | DIASTOLIC BLOOD PRESSURE: 84 MMHG | TEMPERATURE: 97.9 F | SYSTOLIC BLOOD PRESSURE: 132 MMHG

## 2022-01-01 VITALS
HEART RATE: 97 BPM | BODY MASS INDEX: 26.84 KG/M2 | SYSTOLIC BLOOD PRESSURE: 91 MMHG | DIASTOLIC BLOOD PRESSURE: 61 MMHG | WEIGHT: 167 LBS | OXYGEN SATURATION: 87 % | HEIGHT: 66 IN | TEMPERATURE: 98.2 F

## 2022-01-01 DIAGNOSIS — J18.9 PNEUMONIA OF RIGHT LOWER LOBE DUE TO INFECTIOUS ORGANISM: ICD-10-CM

## 2022-01-01 DIAGNOSIS — I27.20 PULMONARY HYPERTENSION: ICD-10-CM

## 2022-01-01 DIAGNOSIS — E78.2 MIXED HYPERLIPIDEMIA: ICD-10-CM

## 2022-01-01 DIAGNOSIS — R73.01 IFG (IMPAIRED FASTING GLUCOSE): ICD-10-CM

## 2022-01-01 DIAGNOSIS — I50.31 ACUTE DIASTOLIC CHF (CONGESTIVE HEART FAILURE): ICD-10-CM

## 2022-01-01 DIAGNOSIS — I10 ESSENTIAL HYPERTENSION: ICD-10-CM

## 2022-01-01 DIAGNOSIS — M79.672 FOOT PAIN, BILATERAL: ICD-10-CM

## 2022-01-01 DIAGNOSIS — L60.0 ONYCHOCRYPTOSIS: Primary | ICD-10-CM

## 2022-01-01 DIAGNOSIS — I48.19 PERSISTENT ATRIAL FIBRILLATION: ICD-10-CM

## 2022-01-01 DIAGNOSIS — J90 RECURRENT PLEURAL EFFUSION ON RIGHT: ICD-10-CM

## 2022-01-01 DIAGNOSIS — N18.9 CHRONIC RENAL IMPAIRMENT, UNSPECIFIED CKD STAGE: ICD-10-CM

## 2022-01-01 DIAGNOSIS — N18.32 STAGE 3B CHRONIC KIDNEY DISEASE: ICD-10-CM

## 2022-01-01 DIAGNOSIS — S32.008A CLOSED FRACTURE OF LUMBAR VERTEBRA WITH SPINAL CORD INJURY, INITIAL ENCOUNTER: Primary | ICD-10-CM

## 2022-01-01 DIAGNOSIS — Z78.9 DECREASED ACTIVITIES OF DAILY LIVING (ADL): ICD-10-CM

## 2022-01-01 DIAGNOSIS — E03.4 HYPOTHYROIDISM DUE TO ACQUIRED ATROPHY OF THYROID: ICD-10-CM

## 2022-01-01 DIAGNOSIS — M1A.0790 IDIOPATHIC CHRONIC GOUT OF FOOT WITHOUT TOPHUS, UNSPECIFIED LATERALITY: ICD-10-CM

## 2022-01-01 DIAGNOSIS — M72.0 DUPUYTREN'S DISEASE: Primary | ICD-10-CM

## 2022-01-01 DIAGNOSIS — I48.11 LONGSTANDING PERSISTENT ATRIAL FIBRILLATION: ICD-10-CM

## 2022-01-01 DIAGNOSIS — E43 SEVERE MALNUTRITION: ICD-10-CM

## 2022-01-01 DIAGNOSIS — J96.21 ACUTE ON CHRONIC RESPIRATORY FAILURE WITH HYPOXIA: ICD-10-CM

## 2022-01-01 DIAGNOSIS — J90 PLEURAL EFFUSION: Primary | ICD-10-CM

## 2022-01-01 DIAGNOSIS — J81.0 ACUTE PULMONARY EDEMA: ICD-10-CM

## 2022-01-01 DIAGNOSIS — K21.9 GASTROESOPHAGEAL REFLUX DISEASE, UNSPECIFIED WHETHER ESOPHAGITIS PRESENT: ICD-10-CM

## 2022-01-01 DIAGNOSIS — M79.671 FOOT PAIN, BILATERAL: Primary | ICD-10-CM

## 2022-01-01 DIAGNOSIS — M65.30 TRIGGER FINGER, UNSPECIFIED FINGER, UNSPECIFIED LATERALITY: Primary | ICD-10-CM

## 2022-01-01 DIAGNOSIS — J90 PLEURAL EFFUSION ON RIGHT: ICD-10-CM

## 2022-01-01 DIAGNOSIS — R53.83 OTHER FATIGUE: ICD-10-CM

## 2022-01-01 DIAGNOSIS — I48.91 ATRIAL FIBRILLATION, UNSPECIFIED TYPE: ICD-10-CM

## 2022-01-01 DIAGNOSIS — I50.31 ACUTE DIASTOLIC CHF (CONGESTIVE HEART FAILURE): Primary | ICD-10-CM

## 2022-01-01 DIAGNOSIS — I50.32 CHRONIC DIASTOLIC CONGESTIVE HEART FAILURE: ICD-10-CM

## 2022-01-01 DIAGNOSIS — E11.9 NON-INSULIN DEPENDENT TYPE 2 DIABETES MELLITUS: ICD-10-CM

## 2022-01-01 DIAGNOSIS — N18.32 STAGE 3B CHRONIC KIDNEY DISEASE: Primary | ICD-10-CM

## 2022-01-01 DIAGNOSIS — B35.1 ONYCHOMYCOSIS: ICD-10-CM

## 2022-01-01 DIAGNOSIS — I10 ESSENTIAL HYPERTENSION: Primary | ICD-10-CM

## 2022-01-01 DIAGNOSIS — M79.672 FOOT PAIN, BILATERAL: Primary | ICD-10-CM

## 2022-01-01 DIAGNOSIS — E11.8 DIABETIC FOOT: ICD-10-CM

## 2022-01-01 DIAGNOSIS — M79.671 FOOT PAIN, BILATERAL: ICD-10-CM

## 2022-01-01 DIAGNOSIS — L60.0 ONYCHOCRYPTOSIS: ICD-10-CM

## 2022-01-01 DIAGNOSIS — M79.642 HAND PAIN, LEFT: Primary | ICD-10-CM

## 2022-01-01 DIAGNOSIS — I25.10 CORONARY ARTERY DISEASE INVOLVING NATIVE CORONARY ARTERY OF NATIVE HEART WITHOUT ANGINA PECTORIS: Primary | ICD-10-CM

## 2022-01-01 DIAGNOSIS — R26.2 DIFFICULTY WALKING: ICD-10-CM

## 2022-01-01 DIAGNOSIS — J96.21 ACUTE ON CHRONIC RESPIRATORY FAILURE WITH HYPOXIA: Primary | ICD-10-CM

## 2022-01-01 DIAGNOSIS — S34.109A CLOSED FRACTURE OF LUMBAR VERTEBRA WITH SPINAL CORD INJURY, INITIAL ENCOUNTER: Primary | ICD-10-CM

## 2022-01-01 DIAGNOSIS — J90 PLEURAL EFFUSION: ICD-10-CM

## 2022-01-01 DIAGNOSIS — R11.0 NAUSEA: ICD-10-CM

## 2022-01-01 DIAGNOSIS — I10 HYPERTENSION, ESSENTIAL: ICD-10-CM

## 2022-01-01 LAB
ALBUMIN FLD-MCNC: 1.5 G/DL
ALBUMIN SERPL-MCNC: 3.3 G/DL (ref 3.5–5.2)
ALBUMIN SERPL-MCNC: 3.4 G/DL (ref 3.5–5.2)
ALBUMIN SERPL-MCNC: 3.8 G/DL (ref 3.5–5.2)
ALBUMIN SERPL-MCNC: 3.8 G/DL (ref 3.5–5.2)
ALBUMIN SERPL-MCNC: 3.9 G/DL (ref 3.5–5.2)
ALBUMIN SERPL-MCNC: 4.4 G/DL (ref 3.5–5.2)
ALBUMIN/GLOB SERPL: 1.2 G/DL
ALBUMIN/GLOB SERPL: 1.3 G/DL
ALBUMIN/GLOB SERPL: 1.5 G/DL
ALBUMIN/GLOB SERPL: 1.5 G/DL
ALP SERPL-CCNC: 102 U/L (ref 39–117)
ALP SERPL-CCNC: 135 U/L (ref 39–117)
ALP SERPL-CCNC: 80 U/L (ref 39–117)
ALP SERPL-CCNC: 83 U/L (ref 39–117)
ALP SERPL-CCNC: 93 U/L (ref 39–117)
ALP SERPL-CCNC: 97 U/L (ref 39–117)
ALT SERPL W P-5'-P-CCNC: 10 U/L (ref 1–41)
ALT SERPL W P-5'-P-CCNC: 5 U/L (ref 1–41)
ALT SERPL W P-5'-P-CCNC: 7 U/L (ref 1–41)
ALT SERPL W P-5'-P-CCNC: 8 U/L (ref 1–41)
ALT SERPL W P-5'-P-CCNC: 9 U/L (ref 1–41)
ALT SERPL W P-5'-P-CCNC: <5 U/L (ref 1–41)
AMYLASE SERPL-CCNC: 20 U/L (ref 28–100)
ANION GAP SERPL CALCULATED.3IONS-SCNC: 10.1 MMOL/L (ref 5–15)
ANION GAP SERPL CALCULATED.3IONS-SCNC: 10.8 MMOL/L (ref 5–15)
ANION GAP SERPL CALCULATED.3IONS-SCNC: 10.9 MMOL/L (ref 5–15)
ANION GAP SERPL CALCULATED.3IONS-SCNC: 11.6 MMOL/L (ref 5–15)
ANION GAP SERPL CALCULATED.3IONS-SCNC: 11.7 MMOL/L (ref 5–15)
ANION GAP SERPL CALCULATED.3IONS-SCNC: 12 MMOL/L (ref 5–15)
ANION GAP SERPL CALCULATED.3IONS-SCNC: 12 MMOL/L (ref 5–15)
ANION GAP SERPL CALCULATED.3IONS-SCNC: 12.7 MMOL/L (ref 5–15)
ANION GAP SERPL CALCULATED.3IONS-SCNC: 12.8 MMOL/L (ref 5–15)
ANION GAP SERPL CALCULATED.3IONS-SCNC: 12.9 MMOL/L (ref 5–15)
ANION GAP SERPL CALCULATED.3IONS-SCNC: 13.8 MMOL/L (ref 5–15)
ANION GAP SERPL CALCULATED.3IONS-SCNC: 14 MMOL/L (ref 5–15)
ANION GAP SERPL CALCULATED.3IONS-SCNC: 14.1 MMOL/L (ref 5–15)
ANION GAP SERPL CALCULATED.3IONS-SCNC: 16.3 MMOL/L (ref 5–15)
ANION GAP SERPL CALCULATED.3IONS-SCNC: 8.9 MMOL/L (ref 5–15)
ANION GAP SERPL CALCULATED.3IONS-SCNC: 9.2 MMOL/L (ref 5–15)
ANION GAP SERPL CALCULATED.3IONS-SCNC: 9.6 MMOL/L (ref 5–15)
APPEARANCE FLD: CLEAR
AST SERPL-CCNC: 16 U/L (ref 1–40)
AST SERPL-CCNC: 19 U/L (ref 1–40)
AST SERPL-CCNC: 27 U/L (ref 1–40)
AST SERPL-CCNC: 29 U/L (ref 1–40)
AST SERPL-CCNC: 37 U/L (ref 1–40)
AST SERPL-CCNC: 40 U/L (ref 1–40)
BACTERIA FLD CULT: NORMAL
BACTERIA SPEC AEROBE CULT: NO GROWTH
BACTERIA SPEC AEROBE CULT: NORMAL
BACTERIA SPEC AEROBE CULT: NORMAL
BACTERIA SPEC ANAEROBE CULT: NORMAL
BACTERIA SPEC RESP CULT: NORMAL
BACTERIA UR QL AUTO: ABNORMAL /HPF
BASOPHILS # BLD AUTO: 0.01 10*3/MM3 (ref 0–0.2)
BASOPHILS # BLD AUTO: 0.04 10*3/MM3 (ref 0–0.2)
BASOPHILS # BLD AUTO: 0.06 10*3/MM3 (ref 0–0.2)
BASOPHILS # BLD AUTO: 0.07 10*3/MM3 (ref 0–0.2)
BASOPHILS # BLD AUTO: 0.12 10*3/MM3 (ref 0–0.2)
BASOPHILS NFR BLD AUTO: 0.2 % (ref 0–1.5)
BASOPHILS NFR BLD AUTO: 0.7 % (ref 0–1.5)
BASOPHILS NFR BLD AUTO: 1.1 % (ref 0–1.5)
BASOPHILS NFR BLD AUTO: 1.2 % (ref 0–1.5)
BASOPHILS NFR BLD AUTO: 1.5 % (ref 0–1.5)
BASOPHILS NFR FLD: 0 %
BILIRUB CONJ SERPL-MCNC: 0.4 MG/DL (ref 0–0.3)
BILIRUB CONJ SERPL-MCNC: 0.5 MG/DL (ref 0–0.3)
BILIRUB INDIRECT SERPL-MCNC: 0.6 MG/DL
BILIRUB INDIRECT SERPL-MCNC: 0.7 MG/DL
BILIRUB SERPL-MCNC: 0.9 MG/DL (ref 0–1.2)
BILIRUB SERPL-MCNC: 1 MG/DL (ref 0–1.2)
BILIRUB SERPL-MCNC: 1 MG/DL (ref 0–1.2)
BILIRUB SERPL-MCNC: 1.1 MG/DL (ref 0–1.2)
BILIRUB SERPL-MCNC: 1.2 MG/DL (ref 0–1.2)
BILIRUB SERPL-MCNC: 2.1 MG/DL (ref 0–1.2)
BILIRUB UR QL STRIP: NEGATIVE
BUN SERPL-MCNC: 18 MG/DL (ref 8–23)
BUN SERPL-MCNC: 18 MG/DL (ref 8–23)
BUN SERPL-MCNC: 19 MG/DL (ref 8–23)
BUN SERPL-MCNC: 25 MG/DL (ref 8–23)
BUN SERPL-MCNC: 26 MG/DL (ref 8–23)
BUN SERPL-MCNC: 28 MG/DL (ref 8–23)
BUN SERPL-MCNC: 29 MG/DL (ref 8–23)
BUN SERPL-MCNC: 29 MG/DL (ref 8–23)
BUN SERPL-MCNC: 30 MG/DL (ref 8–23)
BUN SERPL-MCNC: 31 MG/DL (ref 8–23)
BUN SERPL-MCNC: 32 MG/DL (ref 8–23)
BUN SERPL-MCNC: 34 MG/DL (ref 8–23)
BUN SERPL-MCNC: 38 MG/DL (ref 8–23)
BUN SERPL-MCNC: 41 MG/DL (ref 8–23)
BUN SERPL-MCNC: 47 MG/DL (ref 8–23)
BUN/CREAT SERPL: 10.6 (ref 7–25)
BUN/CREAT SERPL: 11.5 (ref 7–25)
BUN/CREAT SERPL: 12.8 (ref 7–25)
BUN/CREAT SERPL: 14 (ref 7–25)
BUN/CREAT SERPL: 19 (ref 7–25)
BUN/CREAT SERPL: 20.1 (ref 7–25)
BUN/CREAT SERPL: 20.4 (ref 7–25)
BUN/CREAT SERPL: 20.4 (ref 7–25)
BUN/CREAT SERPL: 20.6 (ref 7–25)
BUN/CREAT SERPL: 21.2 (ref 7–25)
BUN/CREAT SERPL: 21.5 (ref 7–25)
BUN/CREAT SERPL: 21.7 (ref 7–25)
BUN/CREAT SERPL: 22.7 (ref 7–25)
BUN/CREAT SERPL: 22.7 (ref 7–25)
BUN/CREAT SERPL: 22.8 (ref 7–25)
BUN/CREAT SERPL: 25.8 (ref 7–25)
BUN/CREAT SERPL: 29.2 (ref 7–25)
CALCIUM SPEC-SCNC: 10.3 MG/DL (ref 8.6–10.5)
CALCIUM SPEC-SCNC: 9.1 MG/DL (ref 8.6–10.5)
CALCIUM SPEC-SCNC: 9.2 MG/DL (ref 8.6–10.5)
CALCIUM SPEC-SCNC: 9.4 MG/DL (ref 8.6–10.5)
CALCIUM SPEC-SCNC: 9.4 MG/DL (ref 8.6–10.5)
CALCIUM SPEC-SCNC: 9.5 MG/DL (ref 8.6–10.5)
CALCIUM SPEC-SCNC: 9.7 MG/DL (ref 8.6–10.5)
CALCIUM SPEC-SCNC: 9.8 MG/DL (ref 8.6–10.5)
CALCIUM SPEC-SCNC: 9.8 MG/DL (ref 8.6–10.5)
CALCIUM SPEC-SCNC: 9.9 MG/DL (ref 8.6–10.5)
CALCIUM SPEC-SCNC: 9.9 MG/DL (ref 8.6–10.5)
CHLORIDE SERPL-SCNC: 100 MMOL/L (ref 98–107)
CHLORIDE SERPL-SCNC: 100 MMOL/L (ref 98–107)
CHLORIDE SERPL-SCNC: 101 MMOL/L (ref 98–107)
CHLORIDE SERPL-SCNC: 101 MMOL/L (ref 98–107)
CHLORIDE SERPL-SCNC: 92 MMOL/L (ref 98–107)
CHLORIDE SERPL-SCNC: 93 MMOL/L (ref 98–107)
CHLORIDE SERPL-SCNC: 94 MMOL/L (ref 98–107)
CHLORIDE SERPL-SCNC: 95 MMOL/L (ref 98–107)
CHLORIDE SERPL-SCNC: 96 MMOL/L (ref 98–107)
CHLORIDE SERPL-SCNC: 96 MMOL/L (ref 98–107)
CHLORIDE SERPL-SCNC: 97 MMOL/L (ref 98–107)
CHLORIDE SERPL-SCNC: 97 MMOL/L (ref 98–107)
CHLORIDE SERPL-SCNC: 98 MMOL/L (ref 98–107)
CHLORIDE SERPL-SCNC: 98 MMOL/L (ref 98–107)
CHLORIDE SERPL-SCNC: 99 MMOL/L (ref 98–107)
CHOLEST FLD-MCNC: 34 MG/DL
CHOLEST SERPL-MCNC: 121 MG/DL (ref 0–200)
CHOLEST SERPL-MCNC: 178 MG/DL (ref 0–200)
CHOLEST SERPL-MCNC: 86 MG/DL (ref 0–200)
CLARITY UR: CLEAR
CO2 SERPL-SCNC: 24.2 MMOL/L (ref 22–29)
CO2 SERPL-SCNC: 27.9 MMOL/L (ref 22–29)
CO2 SERPL-SCNC: 28.2 MMOL/L (ref 22–29)
CO2 SERPL-SCNC: 29 MMOL/L (ref 22–29)
CO2 SERPL-SCNC: 29.2 MMOL/L (ref 22–29)
CO2 SERPL-SCNC: 29.3 MMOL/L (ref 22–29)
CO2 SERPL-SCNC: 29.8 MMOL/L (ref 22–29)
CO2 SERPL-SCNC: 29.9 MMOL/L (ref 22–29)
CO2 SERPL-SCNC: 30 MMOL/L (ref 22–29)
CO2 SERPL-SCNC: 30.4 MMOL/L (ref 22–29)
CO2 SERPL-SCNC: 30.7 MMOL/L (ref 22–29)
CO2 SERPL-SCNC: 31.4 MMOL/L (ref 22–29)
CO2 SERPL-SCNC: 32.1 MMOL/L (ref 22–29)
CO2 SERPL-SCNC: 32.1 MMOL/L (ref 22–29)
CO2 SERPL-SCNC: 32.3 MMOL/L (ref 22–29)
CO2 SERPL-SCNC: 33 MMOL/L (ref 22–29)
CO2 SERPL-SCNC: 33.1 MMOL/L (ref 22–29)
COLOR FLD: YELLOW
COLOR UR: YELLOW
CREAT SERPL-MCNC: 1.15 MG/DL (ref 0.76–1.27)
CREAT SERPL-MCNC: 1.21 MG/DL (ref 0.76–1.27)
CREAT SERPL-MCNC: 1.28 MG/DL (ref 0.76–1.27)
CREAT SERPL-MCNC: 1.32 MG/DL (ref 0.76–1.27)
CREAT SERPL-MCNC: 1.32 MG/DL (ref 0.76–1.27)
CREAT SERPL-MCNC: 1.36 MG/DL (ref 0.76–1.27)
CREAT SERPL-MCNC: 1.41 MG/DL (ref 0.76–1.27)
CREAT SERPL-MCNC: 1.49 MG/DL (ref 0.76–1.27)
CREAT SERPL-MCNC: 1.51 MG/DL (ref 0.76–1.27)
CREAT SERPL-MCNC: 1.52 MG/DL (ref 0.76–1.27)
CREAT SERPL-MCNC: 1.52 MG/DL (ref 0.76–1.27)
CREAT SERPL-MCNC: 1.57 MG/DL (ref 0.76–1.27)
CREAT SERPL-MCNC: 1.61 MG/DL (ref 0.76–1.27)
CREAT SERPL-MCNC: 1.7 MG/DL (ref 0.76–1.27)
CREAT SERPL-MCNC: 1.89 MG/DL (ref 0.76–1.27)
CREAT SERPL-MCNC: 2 MG/DL (ref 0.76–1.27)
CREAT SERPL-MCNC: 2.16 MG/DL (ref 0.76–1.27)
CYTO UR: NORMAL
CYTO UR: NORMAL
D-LACTATE SERPL-SCNC: 1.8 MMOL/L (ref 0.5–2)
DEPRECATED RDW RBC AUTO: 47.7 FL (ref 37–54)
DEPRECATED RDW RBC AUTO: 48.8 FL (ref 37–54)
DEPRECATED RDW RBC AUTO: 53 FL (ref 37–54)
DEPRECATED RDW RBC AUTO: 56.1 FL (ref 37–54)
DEPRECATED RDW RBC AUTO: 59.6 FL (ref 37–54)
EGFRCR SERPLBLD CKD-EPI 2021: 30.2 ML/MIN/1.73
EGFRCR SERPLBLD CKD-EPI 2021: 33.1 ML/MIN/1.73
EGFRCR SERPLBLD CKD-EPI 2021: 35.4 ML/MIN/1.73
EGFRCR SERPLBLD CKD-EPI 2021: 40.2 ML/MIN/1.73
EGFRCR SERPLBLD CKD-EPI 2021: 43 ML/MIN/1.73
EGFRCR SERPLBLD CKD-EPI 2021: 47.1 ML/MIN/1.73
EOSINOPHIL # BLD AUTO: 0 10*3/MM3 (ref 0–0.4)
EOSINOPHIL # BLD AUTO: 0.5 10*3/MM3 (ref 0–0.4)
EOSINOPHIL # BLD AUTO: 0.54 10*3/MM3 (ref 0–0.4)
EOSINOPHIL # BLD AUTO: 0.54 10*3/MM3 (ref 0–0.4)
EOSINOPHIL # BLD AUTO: 0.62 10*3/MM3 (ref 0–0.4)
EOSINOPHIL NFR BLD AUTO: 0 % (ref 0.3–6.2)
EOSINOPHIL NFR BLD AUTO: 10.7 % (ref 0.3–6.2)
EOSINOPHIL NFR BLD AUTO: 6.1 % (ref 0.3–6.2)
EOSINOPHIL NFR BLD AUTO: 9 % (ref 0.3–6.2)
EOSINOPHIL NFR BLD AUTO: 9.5 % (ref 0.3–6.2)
EOSINOPHIL NFR FLD MANUAL: 2 %
ERYTHROCYTE [DISTWIDTH] IN BLOOD BY AUTOMATED COUNT: 14 % (ref 12.3–15.4)
ERYTHROCYTE [DISTWIDTH] IN BLOOD BY AUTOMATED COUNT: 14.2 % (ref 12.3–15.4)
ERYTHROCYTE [DISTWIDTH] IN BLOOD BY AUTOMATED COUNT: 15.7 % (ref 12.3–15.4)
ERYTHROCYTE [DISTWIDTH] IN BLOOD BY AUTOMATED COUNT: 15.9 % (ref 12.3–15.4)
ERYTHROCYTE [DISTWIDTH] IN BLOOD BY AUTOMATED COUNT: 16.8 % (ref 12.3–15.4)
FLUAV AG NPH QL: NEGATIVE
FLUBV AG NPH QL IA: NEGATIVE
FUNGUS WND CULT: NORMAL
GFR SERPL CREATININE-BSD FRML MDRD: 43 ML/MIN/1.73
GFR SERPL CREATININE-BSD FRML MDRD: 44 ML/MIN/1.73
GFR SERPL CREATININE-BSD FRML MDRD: 44 ML/MIN/1.73
GFR SERPL CREATININE-BSD FRML MDRD: 45 ML/MIN/1.73
GFR SERPL CREATININE-BSD FRML MDRD: 48 ML/MIN/1.73
GFR SERPL CREATININE-BSD FRML MDRD: 50 ML/MIN/1.73
GFR SERPL CREATININE-BSD FRML MDRD: 52 ML/MIN/1.73
GFR SERPL CREATININE-BSD FRML MDRD: 52 ML/MIN/1.73
GFR SERPL CREATININE-BSD FRML MDRD: 54 ML/MIN/1.73
GFR SERPL CREATININE-BSD FRML MDRD: 58 ML/MIN/1.73
GFR SERPL CREATININE-BSD FRML MDRD: 61 ML/MIN/1.73
GLOBULIN UR ELPH-MCNC: 2.6 GM/DL
GLOBULIN UR ELPH-MCNC: 2.6 GM/DL
GLOBULIN UR ELPH-MCNC: 3.3 GM/DL
GLOBULIN UR ELPH-MCNC: 3.4 GM/DL
GLUCOSE FLD-MCNC: 164 MG/DL
GLUCOSE SERPL-MCNC: 105 MG/DL (ref 65–99)
GLUCOSE SERPL-MCNC: 107 MG/DL (ref 65–99)
GLUCOSE SERPL-MCNC: 108 MG/DL (ref 65–99)
GLUCOSE SERPL-MCNC: 111 MG/DL (ref 65–99)
GLUCOSE SERPL-MCNC: 112 MG/DL (ref 65–99)
GLUCOSE SERPL-MCNC: 113 MG/DL (ref 65–99)
GLUCOSE SERPL-MCNC: 113 MG/DL (ref 65–99)
GLUCOSE SERPL-MCNC: 117 MG/DL (ref 65–99)
GLUCOSE SERPL-MCNC: 117 MG/DL (ref 65–99)
GLUCOSE SERPL-MCNC: 123 MG/DL (ref 65–99)
GLUCOSE SERPL-MCNC: 125 MG/DL (ref 65–99)
GLUCOSE SERPL-MCNC: 126 MG/DL (ref 65–99)
GLUCOSE SERPL-MCNC: 127 MG/DL (ref 65–99)
GLUCOSE SERPL-MCNC: 148 MG/DL (ref 65–99)
GLUCOSE SERPL-MCNC: 152 MG/DL (ref 65–99)
GLUCOSE SERPL-MCNC: 159 MG/DL (ref 65–99)
GLUCOSE SERPL-MCNC: 164 MG/DL (ref 65–99)
GLUCOSE UR STRIP-MCNC: NEGATIVE MG/DL
GRAM STN SPEC: NORMAL
HBA1C MFR BLD: 5.7 % (ref 4.8–5.6)
HCT VFR BLD AUTO: 39.5 % (ref 37.5–51)
HCT VFR BLD AUTO: 40.4 % (ref 37.5–51)
HCT VFR BLD AUTO: 44.2 % (ref 37.5–51)
HCT VFR BLD AUTO: 44.6 % (ref 37.5–51)
HCT VFR BLD AUTO: 49.9 % (ref 37.5–51)
HDLC SERPL-MCNC: 30 MG/DL (ref 40–60)
HDLC SERPL-MCNC: 42 MG/DL (ref 40–60)
HDLC SERPL-MCNC: 50 MG/DL (ref 40–60)
HGB BLD-MCNC: 13.2 G/DL (ref 13–17.7)
HGB BLD-MCNC: 13.3 G/DL (ref 13–17.7)
HGB BLD-MCNC: 15 G/DL (ref 13–17.7)
HGB BLD-MCNC: 15 G/DL (ref 13–17.7)
HGB BLD-MCNC: 16.3 G/DL (ref 13–17.7)
HGB UR QL STRIP.AUTO: NEGATIVE
HOLD SPECIMEN: NORMAL
HOLD SPECIMEN: NORMAL
HYALINE CASTS UR QL AUTO: ABNORMAL /LPF
IMM GRANULOCYTES # BLD AUTO: 0.01 10*3/MM3 (ref 0–0.05)
IMM GRANULOCYTES # BLD AUTO: 0.01 10*3/MM3 (ref 0–0.05)
IMM GRANULOCYTES # BLD AUTO: 0.03 10*3/MM3 (ref 0–0.05)
IMM GRANULOCYTES # BLD AUTO: 0.03 10*3/MM3 (ref 0–0.05)
IMM GRANULOCYTES NFR BLD AUTO: 0.2 % (ref 0–0.5)
IMM GRANULOCYTES NFR BLD AUTO: 0.2 % (ref 0–0.5)
IMM GRANULOCYTES NFR BLD AUTO: 0.4 % (ref 0–0.5)
IMM GRANULOCYTES NFR BLD AUTO: 0.5 % (ref 0–0.5)
KETONES UR QL STRIP: ABNORMAL
LAB AP CASE REPORT: NORMAL
LAB AP CASE REPORT: NORMAL
LAB AP CLINICAL INFORMATION: NORMAL
LAB AP CLINICAL INFORMATION: NORMAL
LDH FLD-CCNC: 57 U/L
LDLC SERPL CALC-MCNC: 123 MG/DL (ref 0–100)
LDLC SERPL CALC-MCNC: 40 MG/DL (ref 0–100)
LDLC SERPL CALC-MCNC: 47 MG/DL (ref 0–100)
LDLC/HDLC SERPL: 0.85 {RATIO}
LDLC/HDLC SERPL: 1.37 {RATIO}
LDLC/HDLC SERPL: 2.9 {RATIO}
LEUKOCYTE ESTERASE UR QL STRIP.AUTO: NEGATIVE
LIPASE SERPL-CCNC: 17 U/L (ref 13–60)
LYMPHOCYTES # BLD AUTO: 1.14 10*3/MM3 (ref 0.7–3.1)
LYMPHOCYTES # BLD AUTO: 1.35 10*3/MM3 (ref 0.7–3.1)
LYMPHOCYTES # BLD AUTO: 1.64 10*3/MM3 (ref 0.7–3.1)
LYMPHOCYTES # BLD AUTO: 1.64 10*3/MM3 (ref 0.7–3.1)
LYMPHOCYTES # BLD AUTO: 1.67 10*3/MM3 (ref 0.7–3.1)
LYMPHOCYTES NFR BLD AUTO: 19.2 % (ref 19.6–45.3)
LYMPHOCYTES NFR BLD AUTO: 20.3 % (ref 19.6–45.3)
LYMPHOCYTES NFR BLD AUTO: 23.4 % (ref 19.6–45.3)
LYMPHOCYTES NFR BLD AUTO: 27.2 % (ref 19.6–45.3)
LYMPHOCYTES NFR BLD AUTO: 28.9 % (ref 19.6–45.3)
LYMPHOCYTES NFR FLD MANUAL: 49 %
MACROPHAGE FLUID: 11 %
MAGNESIUM SERPL-MCNC: 1.5 MG/DL (ref 1.6–2.4)
MAGNESIUM SERPL-MCNC: 1.9 MG/DL (ref 1.6–2.4)
MAGNESIUM SERPL-MCNC: 1.9 MG/DL (ref 1.6–2.4)
MAGNESIUM SERPL-MCNC: 2.2 MG/DL (ref 1.6–2.4)
MCH RBC QN AUTO: 30.2 PG (ref 26.6–33)
MCH RBC QN AUTO: 31.3 PG (ref 26.6–33)
MCH RBC QN AUTO: 31.9 PG (ref 26.6–33)
MCH RBC QN AUTO: 32 PG (ref 26.6–33)
MCH RBC QN AUTO: 32.1 PG (ref 26.6–33)
MCHC RBC AUTO-ENTMCNC: 32.7 G/DL (ref 31.5–35.7)
MCHC RBC AUTO-ENTMCNC: 32.9 G/DL (ref 31.5–35.7)
MCHC RBC AUTO-ENTMCNC: 33.4 G/DL (ref 31.5–35.7)
MCHC RBC AUTO-ENTMCNC: 33.6 G/DL (ref 31.5–35.7)
MCHC RBC AUTO-ENTMCNC: 33.9 G/DL (ref 31.5–35.7)
MCV RBC AUTO: 91.6 FL (ref 79–97)
MCV RBC AUTO: 92.1 FL (ref 79–97)
MCV RBC AUTO: 95.3 FL (ref 79–97)
MCV RBC AUTO: 95.4 FL (ref 79–97)
MCV RBC AUTO: 97.8 FL (ref 79–97)
MESOTHL CELL NFR FLD MANUAL: 1 %
MONOCYTES # BLD AUTO: 0.41 10*3/MM3 (ref 0.1–0.9)
MONOCYTES # BLD AUTO: 0.53 10*3/MM3 (ref 0.1–0.9)
MONOCYTES # BLD AUTO: 0.58 10*3/MM3 (ref 0.1–0.9)
MONOCYTES # BLD AUTO: 0.62 10*3/MM3 (ref 0.1–0.9)
MONOCYTES # BLD AUTO: 0.65 10*3/MM3 (ref 0.1–0.9)
MONOCYTES NFR BLD AUTO: 10.1 % (ref 5–12)
MONOCYTES NFR BLD AUTO: 10.8 % (ref 5–12)
MONOCYTES NFR BLD AUTO: 6.9 % (ref 5–12)
MONOCYTES NFR BLD AUTO: 7.6 % (ref 5–12)
MONOCYTES NFR BLD AUTO: 9.3 % (ref 5–12)
MONOCYTES NFR FLD: 5 %
MYCOBACTERIUM SPEC CULT: NORMAL
NEUTROPHILS NFR BLD AUTO: 2.9 10*3/MM3 (ref 1.7–7)
NEUTROPHILS NFR BLD AUTO: 3.14 10*3/MM3 (ref 1.7–7)
NEUTROPHILS NFR BLD AUTO: 3.14 10*3/MM3 (ref 1.7–7)
NEUTROPHILS NFR BLD AUTO: 4.34 10*3/MM3 (ref 1.7–7)
NEUTROPHILS NFR BLD AUTO: 5.27 10*3/MM3 (ref 1.7–7)
NEUTROPHILS NFR BLD AUTO: 51 % (ref 42.7–76)
NEUTROPHILS NFR BLD AUTO: 52.1 % (ref 42.7–76)
NEUTROPHILS NFR BLD AUTO: 54.4 % (ref 42.7–76)
NEUTROPHILS NFR BLD AUTO: 64.1 % (ref 42.7–76)
NEUTROPHILS NFR BLD AUTO: 73.2 % (ref 42.7–76)
NEUTROPHILS NFR FLD MANUAL: 32 %
NIGHT BLUE STAIN TISS: NORMAL
NITRITE UR QL STRIP: NEGATIVE
NRBC BLD AUTO-RTO: 0 /100 WBC (ref 0–0.2)
NT-PROBNP SERPL-MCNC: 4073 PG/ML (ref 0–1800)
NT-PROBNP SERPL-MCNC: 5607 PG/ML (ref 0–1800)
NT-PROBNP SERPL-MCNC: 5768 PG/ML (ref 0–1800)
NT-PROBNP SERPL-MCNC: 6799 PG/ML (ref 0–1800)
NT-PROBNP SERPL-MCNC: 7326 PG/ML (ref 0–1800)
NT-PROBNP SERPL-MCNC: 7383 PG/ML (ref 0–1800)
NT-PROBNP SERPL-MCNC: 9145 PG/ML (ref 0–1800)
NT-PROBNP SERPL-MCNC: ABNORMAL PG/ML (ref 0–1800)
NUC CELL # FLD: 150 /MM3
PATH REPORT.FINAL DX SPEC: NORMAL
PATH REPORT.FINAL DX SPEC: NORMAL
PATH REPORT.GROSS SPEC: NORMAL
PATH REPORT.GROSS SPEC: NORMAL
PH FLD: 8 [PH]
PH UR STRIP.AUTO: 6.5 [PH] (ref 5–8)
PHOSPHATE SERPL-MCNC: 3 MG/DL (ref 2.5–4.5)
PHOSPHATE SERPL-MCNC: 3.2 MG/DL (ref 2.5–4.5)
PLATELET # BLD AUTO: 106 10*3/MM3 (ref 140–450)
PLATELET # BLD AUTO: 130 10*3/MM3 (ref 140–450)
PLATELET # BLD AUTO: 132 10*3/MM3 (ref 140–450)
PLATELET # BLD AUTO: 137 10*3/MM3 (ref 140–450)
PLATELET # BLD AUTO: 142 10*3/MM3 (ref 140–450)
PMV BLD AUTO: 10.6 FL (ref 6–12)
PMV BLD AUTO: 11 FL (ref 6–12)
PMV BLD AUTO: 11.2 FL (ref 6–12)
PMV BLD AUTO: 11.5 FL (ref 6–12)
PMV BLD AUTO: 9.9 FL (ref 6–12)
POTASSIUM SERPL-SCNC: 3.5 MMOL/L (ref 3.5–5.2)
POTASSIUM SERPL-SCNC: 3.6 MMOL/L (ref 3.5–5.2)
POTASSIUM SERPL-SCNC: 3.8 MMOL/L (ref 3.5–5.2)
POTASSIUM SERPL-SCNC: 3.9 MMOL/L (ref 3.5–5.2)
POTASSIUM SERPL-SCNC: 4 MMOL/L (ref 3.5–5.2)
POTASSIUM SERPL-SCNC: 4.2 MMOL/L (ref 3.5–5.2)
POTASSIUM SERPL-SCNC: 4.3 MMOL/L (ref 3.5–5.2)
POTASSIUM SERPL-SCNC: 4.5 MMOL/L (ref 3.5–5.2)
POTASSIUM SERPL-SCNC: 4.6 MMOL/L (ref 3.5–5.2)
POTASSIUM SERPL-SCNC: 4.6 MMOL/L (ref 3.5–5.2)
POTASSIUM SERPL-SCNC: 4.7 MMOL/L (ref 3.5–5.2)
POTASSIUM SERPL-SCNC: 4.7 MMOL/L (ref 3.5–5.2)
PROCALCITONIN SERPL-MCNC: 0.12 NG/ML (ref 0–0.25)
PROT FLD-MCNC: 2.6 G/DL
PROT SERPL-MCNC: 6 G/DL (ref 6–8.5)
PROT SERPL-MCNC: 6.3 G/DL (ref 6–8.5)
PROT SERPL-MCNC: 6.4 G/DL (ref 6–8.5)
PROT SERPL-MCNC: 6.4 G/DL (ref 6–8.5)
PROT SERPL-MCNC: 7.2 G/DL (ref 6–8.5)
PROT SERPL-MCNC: 7.8 G/DL (ref 6–8.5)
PROT UR QL STRIP: ABNORMAL
QT INTERVAL: 423 MS
RBC # BLD AUTO: 4.14 10*6/MM3 (ref 4.14–5.8)
RBC # BLD AUTO: 4.41 10*6/MM3 (ref 4.14–5.8)
RBC # BLD AUTO: 4.68 10*6/MM3 (ref 4.14–5.8)
RBC # BLD AUTO: 4.8 10*6/MM3 (ref 4.14–5.8)
RBC # BLD AUTO: 5.1 10*6/MM3 (ref 4.14–5.8)
RBC # FLD AUTO: <2000 /MM3
RBC # UR STRIP: ABNORMAL /HPF
REF LAB TEST METHOD: ABNORMAL
SARS-COV-2 RNA PNL SPEC NAA+PROBE: NOT DETECTED
SODIUM SERPL-SCNC: 132 MMOL/L (ref 136–145)
SODIUM SERPL-SCNC: 133 MMOL/L (ref 136–145)
SODIUM SERPL-SCNC: 135 MMOL/L (ref 136–145)
SODIUM SERPL-SCNC: 136 MMOL/L (ref 136–145)
SODIUM SERPL-SCNC: 136 MMOL/L (ref 136–145)
SODIUM SERPL-SCNC: 138 MMOL/L (ref 136–145)
SODIUM SERPL-SCNC: 138 MMOL/L (ref 136–145)
SODIUM SERPL-SCNC: 139 MMOL/L (ref 136–145)
SODIUM SERPL-SCNC: 139 MMOL/L (ref 136–145)
SODIUM SERPL-SCNC: 141 MMOL/L (ref 136–145)
SODIUM SERPL-SCNC: 142 MMOL/L (ref 136–145)
SODIUM SERPL-SCNC: 145 MMOL/L (ref 136–145)
SP GR UR STRIP: >1.03 (ref 1–1.03)
SQUAMOUS #/AREA URNS HPF: ABNORMAL /HPF
STAT OF ADQ CVX/VAG CYTO-IMP: NORMAL
STAT OF ADQ CVX/VAG CYTO-IMP: NORMAL
T4 FREE SERPL-MCNC: 1.49 NG/DL (ref 0.93–1.7)
T4 FREE SERPL-MCNC: 2.95 NG/DL (ref 0.93–1.7)
TRIGL FLD-MCNC: 25 MG/DL
TRIGL SERPL-MCNC: 142 MG/DL (ref 0–150)
TRIGL SERPL-MCNC: 70 MG/DL (ref 0–150)
TRIGL SERPL-MCNC: 75 MG/DL (ref 0–150)
TROPONIN T SERPL-MCNC: 0.01 NG/ML (ref 0–0.03)
TROPONIN T SERPL-MCNC: 0.02 NG/ML (ref 0–0.03)
TSH SERPL DL<=0.05 MIU/L-ACNC: 0.01 UIU/ML (ref 0.27–4.2)
TSH SERPL DL<=0.05 MIU/L-ACNC: 0.77 UIU/ML (ref 0.27–4.2)
TSH SERPL DL<=0.05 MIU/L-ACNC: 5.94 UIU/ML (ref 0.27–4.2)
URATE SERPL-MCNC: 8.5 MG/DL (ref 3.4–7)
UROBILINOGEN UR QL STRIP: ABNORMAL
VLDLC SERPL-MCNC: 13 MG/DL (ref 5–40)
VLDLC SERPL-MCNC: 16 MG/DL (ref 5–40)
VLDLC SERPL-MCNC: 24 MG/DL (ref 5–40)
WBC # UR STRIP: ABNORMAL /HPF
WBC NRBC COR # BLD: 5.68 10*3/MM3 (ref 3.4–10.8)
WBC NRBC COR # BLD: 5.77 10*3/MM3 (ref 3.4–10.8)
WBC NRBC COR # BLD: 5.93 10*3/MM3 (ref 3.4–10.8)
WBC NRBC COR # BLD: 6.02 10*3/MM3 (ref 3.4–10.8)
WBC NRBC COR # BLD: 8.21 10*3/MM3 (ref 3.4–10.8)
WHOLE BLOOD HOLD SPECIMEN: NORMAL
WHOLE BLOOD HOLD SPECIMEN: NORMAL

## 2022-01-01 PROCEDURE — 83615 LACTATE (LD) (LDH) ENZYME: CPT | Performed by: INTERNAL MEDICINE

## 2022-01-01 PROCEDURE — 25010000002 CEFTRIAXONE PER 250 MG: Performed by: INTERNAL MEDICINE

## 2022-01-01 PROCEDURE — 25010000002 FUROSEMIDE PER 20 MG: Performed by: INTERNAL MEDICINE

## 2022-01-01 PROCEDURE — 80048 BASIC METABOLIC PNL TOTAL CA: CPT | Performed by: INTERNAL MEDICINE

## 2022-01-01 PROCEDURE — 71045 X-RAY EXAM CHEST 1 VIEW: CPT

## 2022-01-01 PROCEDURE — 80061 LIPID PANEL: CPT

## 2022-01-01 PROCEDURE — 87804 INFLUENZA ASSAY W/OPTIC: CPT | Performed by: EMERGENCY MEDICINE

## 2022-01-01 PROCEDURE — 94761 N-INVAS EAR/PLS OXIMETRY MLT: CPT

## 2022-01-01 PROCEDURE — 99204 OFFICE O/P NEW MOD 45 MIN: CPT | Performed by: INTERNAL MEDICINE

## 2022-01-01 PROCEDURE — 85025 COMPLETE CBC W/AUTO DIFF WBC: CPT | Performed by: FAMILY MEDICINE

## 2022-01-01 PROCEDURE — 94799 UNLISTED PULMONARY SVC/PX: CPT

## 2022-01-01 PROCEDURE — 87075 CULTR BACTERIA EXCEPT BLOOD: CPT | Performed by: INTERNAL MEDICINE

## 2022-01-01 PROCEDURE — 25010000002 CEFTRIAXONE PER 250 MG: Performed by: EMERGENCY MEDICINE

## 2022-01-01 PROCEDURE — 99214 OFFICE O/P EST MOD 30 MIN: CPT | Performed by: INTERNAL MEDICINE

## 2022-01-01 PROCEDURE — 99232 SBSQ HOSP IP/OBS MODERATE 35: CPT | Performed by: INTERNAL MEDICINE

## 2022-01-01 PROCEDURE — 94762 N-INVAS EAR/PLS OXIMTRY CONT: CPT

## 2022-01-01 PROCEDURE — 87070 CULTURE OTHR SPECIMN AEROBIC: CPT | Performed by: INTERNAL MEDICINE

## 2022-01-01 PROCEDURE — 94760 N-INVAS EAR/PLS OXIMETRY 1: CPT

## 2022-01-01 PROCEDURE — 83735 ASSAY OF MAGNESIUM: CPT | Performed by: FAMILY MEDICINE

## 2022-01-01 PROCEDURE — 25010000002 HYDROMORPHONE 1 MG/ML SOLUTION: Performed by: EMERGENCY MEDICINE

## 2022-01-01 PROCEDURE — 99233 SBSQ HOSP IP/OBS HIGH 50: CPT | Performed by: INTERNAL MEDICINE

## 2022-01-01 PROCEDURE — 99284 EMERGENCY DEPT VISIT MOD MDM: CPT

## 2022-01-01 PROCEDURE — 99223 1ST HOSP IP/OBS HIGH 75: CPT | Performed by: INTERNAL MEDICINE

## 2022-01-01 PROCEDURE — 87015 SPECIMEN INFECT AGNT CONCNTJ: CPT | Performed by: INTERNAL MEDICINE

## 2022-01-01 PROCEDURE — 84100 ASSAY OF PHOSPHORUS: CPT | Performed by: FAMILY MEDICINE

## 2022-01-01 PROCEDURE — 99232 SBSQ HOSP IP/OBS MODERATE 35: CPT | Performed by: FAMILY MEDICINE

## 2022-01-01 PROCEDURE — 83735 ASSAY OF MAGNESIUM: CPT | Performed by: INTERNAL MEDICINE

## 2022-01-01 PROCEDURE — 89051 BODY FLUID CELL COUNT: CPT | Performed by: INTERNAL MEDICINE

## 2022-01-01 PROCEDURE — 71046 X-RAY EXAM CHEST 2 VIEWS: CPT

## 2022-01-01 PROCEDURE — 25010000002 ONDANSETRON PER 1 MG: Performed by: EMERGENCY MEDICINE

## 2022-01-01 PROCEDURE — 83880 ASSAY OF NATRIURETIC PEPTIDE: CPT

## 2022-01-01 PROCEDURE — 87205 SMEAR GRAM STAIN: CPT | Performed by: NURSE PRACTITIONER

## 2022-01-01 PROCEDURE — 25010000002 MAGNESIUM SULFATE 2 GM/50ML SOLUTION: Performed by: INTERNAL MEDICINE

## 2022-01-01 PROCEDURE — 84484 ASSAY OF TROPONIN QUANT: CPT

## 2022-01-01 PROCEDURE — 84145 PROCALCITONIN (PCT): CPT | Performed by: NURSE PRACTITIONER

## 2022-01-01 PROCEDURE — 93005 ELECTROCARDIOGRAM TRACING: CPT

## 2022-01-01 PROCEDURE — 96375 TX/PRO/DX INJ NEW DRUG ADDON: CPT

## 2022-01-01 PROCEDURE — 36415 COLL VENOUS BLD VENIPUNCTURE: CPT

## 2022-01-01 PROCEDURE — U0004 COV-19 TEST NON-CDC HGH THRU: HCPCS | Performed by: EMERGENCY MEDICINE

## 2022-01-01 PROCEDURE — 80053 COMPREHEN METABOLIC PANEL: CPT | Performed by: EMERGENCY MEDICINE

## 2022-01-01 PROCEDURE — 83690 ASSAY OF LIPASE: CPT | Performed by: INTERNAL MEDICINE

## 2022-01-01 PROCEDURE — 0 IOPAMIDOL PER 1 ML: Performed by: EMERGENCY MEDICINE

## 2022-01-01 PROCEDURE — 11721 DEBRIDE NAIL 6 OR MORE: CPT | Performed by: PODIATRIST

## 2022-01-01 PROCEDURE — 84439 ASSAY OF FREE THYROXINE: CPT

## 2022-01-01 PROCEDURE — 85025 COMPLETE CBC W/AUTO DIFF WBC: CPT

## 2022-01-01 PROCEDURE — 25010000002 AZITHROMYCIN PER 500 MG: Performed by: INTERNAL MEDICINE

## 2022-01-01 PROCEDURE — 81001 URINALYSIS AUTO W/SCOPE: CPT | Performed by: EMERGENCY MEDICINE

## 2022-01-01 PROCEDURE — 87070 CULTURE OTHR SPECIMN AEROBIC: CPT | Performed by: NURSE PRACTITIONER

## 2022-01-01 PROCEDURE — 82042 OTHER SOURCE ALBUMIN QUAN EA: CPT | Performed by: INTERNAL MEDICINE

## 2022-01-01 PROCEDURE — 84478 ASSAY OF TRIGLYCERIDES: CPT | Performed by: INTERNAL MEDICINE

## 2022-01-01 PROCEDURE — 32555 ASPIRATE PLEURA W/ IMAGING: CPT | Performed by: INTERNAL MEDICINE

## 2022-01-01 PROCEDURE — 83735 ASSAY OF MAGNESIUM: CPT | Performed by: EMERGENCY MEDICINE

## 2022-01-01 PROCEDURE — 93010 ELECTROCARDIOGRAM REPORT: CPT | Performed by: INTERNAL MEDICINE

## 2022-01-01 PROCEDURE — 87205 SMEAR GRAM STAIN: CPT | Performed by: INTERNAL MEDICINE

## 2022-01-01 PROCEDURE — 99203 OFFICE O/P NEW LOW 30 MIN: CPT | Performed by: ORTHOPAEDIC SURGERY

## 2022-01-01 PROCEDURE — 87206 SMEAR FLUORESCENT/ACID STAI: CPT | Performed by: INTERNAL MEDICINE

## 2022-01-01 PROCEDURE — 0W993ZX DRAINAGE OF RIGHT PLEURAL CAVITY, PERCUTANEOUS APPROACH, DIAGNOSTIC: ICD-10-PCS | Performed by: INTERNAL MEDICINE

## 2022-01-01 PROCEDURE — 84157 ASSAY OF PROTEIN OTHER: CPT | Performed by: INTERNAL MEDICINE

## 2022-01-01 PROCEDURE — 25010000002 FUROSEMIDE PER 20 MG: Performed by: EMERGENCY MEDICINE

## 2022-01-01 PROCEDURE — 87116 MYCOBACTERIA CULTURE: CPT | Performed by: INTERNAL MEDICINE

## 2022-01-01 PROCEDURE — 82150 ASSAY OF AMYLASE: CPT | Performed by: INTERNAL MEDICINE

## 2022-01-01 PROCEDURE — 84311 SPECTROPHOTOMETRY: CPT | Performed by: INTERNAL MEDICINE

## 2022-01-01 PROCEDURE — 97161 PT EVAL LOW COMPLEX 20 MIN: CPT

## 2022-01-01 PROCEDURE — 80076 HEPATIC FUNCTION PANEL: CPT | Performed by: FAMILY MEDICINE

## 2022-01-01 PROCEDURE — 80048 BASIC METABOLIC PNL TOTAL CA: CPT

## 2022-01-01 PROCEDURE — 84550 ASSAY OF BLOOD/URIC ACID: CPT

## 2022-01-01 PROCEDURE — 84484 ASSAY OF TROPONIN QUANT: CPT | Performed by: INTERNAL MEDICINE

## 2022-01-01 PROCEDURE — 84443 ASSAY THYROID STIM HORMONE: CPT

## 2022-01-01 PROCEDURE — 80053 COMPREHEN METABOLIC PANEL: CPT

## 2022-01-01 PROCEDURE — 96374 THER/PROPH/DIAG INJ IV PUSH: CPT

## 2022-01-01 PROCEDURE — 99203 OFFICE O/P NEW LOW 30 MIN: CPT | Performed by: PODIATRIST

## 2022-01-01 PROCEDURE — 71250 CT THORAX DX C-: CPT

## 2022-01-01 PROCEDURE — 93005 ELECTROCARDIOGRAM TRACING: CPT | Performed by: EMERGENCY MEDICINE

## 2022-01-01 PROCEDURE — 83036 HEMOGLOBIN GLYCOSYLATED A1C: CPT

## 2022-01-01 PROCEDURE — 74177 CT ABD & PELVIS W/CONTRAST: CPT

## 2022-01-01 PROCEDURE — 99495 TRANSJ CARE MGMT MOD F2F 14D: CPT | Performed by: INTERNAL MEDICINE

## 2022-01-01 PROCEDURE — 80061 LIPID PANEL: CPT | Performed by: INTERNAL MEDICINE

## 2022-01-01 PROCEDURE — 99239 HOSP IP/OBS DSCHRG MGMT >30: CPT | Performed by: INTERNAL MEDICINE

## 2022-01-01 PROCEDURE — 97165 OT EVAL LOW COMPLEX 30 MIN: CPT

## 2022-01-01 PROCEDURE — 83880 ASSAY OF NATRIURETIC PEPTIDE: CPT | Performed by: INTERNAL MEDICINE

## 2022-01-01 PROCEDURE — 82945 GLUCOSE OTHER FLUID: CPT | Performed by: INTERNAL MEDICINE

## 2022-01-01 PROCEDURE — 25010000002 AZITHROMYCIN PER 500 MG: Performed by: EMERGENCY MEDICINE

## 2022-01-01 PROCEDURE — 88108 CYTOPATH CONCENTRATE TECH: CPT | Performed by: INTERNAL MEDICINE

## 2022-01-01 PROCEDURE — 83605 ASSAY OF LACTIC ACID: CPT | Performed by: EMERGENCY MEDICINE

## 2022-01-01 PROCEDURE — 87040 BLOOD CULTURE FOR BACTERIA: CPT | Performed by: EMERGENCY MEDICINE

## 2022-01-01 PROCEDURE — G8404 LOW EXTEMITY NEUR EXAM DOCUM: HCPCS | Performed by: PODIATRIST

## 2022-01-01 PROCEDURE — 85025 COMPLETE CBC W/AUTO DIFF WBC: CPT | Performed by: EMERGENCY MEDICINE

## 2022-01-01 PROCEDURE — 83986 ASSAY PH BODY FLUID NOS: CPT | Performed by: INTERNAL MEDICINE

## 2022-01-01 PROCEDURE — 87102 FUNGUS ISOLATION CULTURE: CPT | Performed by: INTERNAL MEDICINE

## 2022-01-01 PROCEDURE — 1111F DSCHRG MED/CURRENT MED MERGE: CPT | Performed by: INTERNAL MEDICINE

## 2022-01-01 PROCEDURE — 96376 TX/PRO/DX INJ SAME DRUG ADON: CPT

## 2022-01-01 PROCEDURE — 87086 URINE CULTURE/COLONY COUNT: CPT | Performed by: EMERGENCY MEDICINE

## 2022-01-01 RX ORDER — FAMOTIDINE 20 MG/1
20 TABLET, FILM COATED ORAL NIGHTLY
Qty: 90 TABLET | Refills: 1 | Status: SHIPPED | OUTPATIENT
Start: 2022-01-01 | End: 2022-01-01 | Stop reason: SDUPTHER

## 2022-01-01 RX ORDER — LISINOPRIL 20 MG/1
10 TABLET ORAL DAILY
Qty: 90 TABLET | Refills: 1
Start: 2022-01-01 | End: 2022-01-01 | Stop reason: SDUPTHER

## 2022-01-01 RX ORDER — POTASSIUM CHLORIDE 750 MG/1
20 CAPSULE, EXTENDED RELEASE ORAL DAILY
Qty: 180 CAPSULE | Refills: 1 | Status: SHIPPED | OUTPATIENT
Start: 2022-01-01

## 2022-01-01 RX ORDER — POTASSIUM CHLORIDE 750 MG/1
20 CAPSULE, EXTENDED RELEASE ORAL 2 TIMES DAILY
Qty: 360 CAPSULE | Refills: 1 | Status: SHIPPED | OUTPATIENT
Start: 2022-01-01 | End: 2022-01-01

## 2022-01-01 RX ORDER — LEVOTHYROXINE SODIUM 0.1 MG/1
100 TABLET ORAL DAILY
Status: DISCONTINUED | OUTPATIENT
Start: 2022-01-01 | End: 2022-01-01

## 2022-01-01 RX ORDER — ALLOPURINOL 100 MG/1
100 TABLET ORAL DAILY
Qty: 90 TABLET | Refills: 1 | Status: SHIPPED | OUTPATIENT
Start: 2022-01-01 | End: 2022-01-01 | Stop reason: SDUPTHER

## 2022-01-01 RX ORDER — CARVEDILOL 12.5 MG/1
12.5 TABLET ORAL 2 TIMES DAILY WITH MEALS
Status: DISCONTINUED | OUTPATIENT
Start: 2022-01-01 | End: 2022-01-01 | Stop reason: HOSPADM

## 2022-01-01 RX ORDER — CARVEDILOL 12.5 MG/1
12.5 TABLET ORAL 2 TIMES DAILY WITH MEALS
Qty: 180 TABLET | Refills: 1 | Status: SHIPPED | OUTPATIENT
Start: 2022-01-01 | End: 2022-01-01 | Stop reason: SDUPTHER

## 2022-01-01 RX ORDER — LISINOPRIL 10 MG/1
10 TABLET ORAL DAILY
Qty: 90 TABLET | Refills: 1
Start: 2022-01-01

## 2022-01-01 RX ORDER — LISINOPRIL 20 MG/1
20 TABLET ORAL DAILY
Qty: 90 TABLET | Refills: 1 | Status: SHIPPED
Start: 2022-01-01 | End: 2022-01-01 | Stop reason: SDUPTHER

## 2022-01-01 RX ORDER — IBUPROFEN 600 MG/1
TABLET ORAL
Qty: 270 TABLET | Refills: 3 | OUTPATIENT
Start: 2022-01-01

## 2022-01-01 RX ORDER — LEVOTHYROXINE SODIUM 0.1 MG/1
100 TABLET ORAL DAILY
Qty: 90 TABLET | Refills: 1 | Status: SHIPPED | OUTPATIENT
Start: 2022-01-01 | End: 2022-01-01 | Stop reason: SDUPTHER

## 2022-01-01 RX ORDER — FUROSEMIDE 40 MG/1
40 TABLET ORAL 2 TIMES DAILY
Qty: 180 TABLET | Refills: 1 | Status: SHIPPED | OUTPATIENT
Start: 2022-01-01 | End: 2022-01-01

## 2022-01-01 RX ORDER — ACETAMINOPHEN 325 MG/1
650 TABLET ORAL EVERY 4 HOURS PRN
Status: DISCONTINUED | OUTPATIENT
Start: 2022-01-01 | End: 2022-01-01 | Stop reason: HOSPADM

## 2022-01-01 RX ORDER — CEFTRIAXONE SODIUM 1 G/50ML
1 INJECTION, SOLUTION INTRAVENOUS ONCE
Status: COMPLETED | OUTPATIENT
Start: 2022-01-01 | End: 2022-01-01

## 2022-01-01 RX ORDER — POLYETHYLENE GLYCOL 3350 17 G/17G
17 POWDER, FOR SOLUTION ORAL DAILY PRN
Status: DISCONTINUED | OUTPATIENT
Start: 2022-01-01 | End: 2022-01-01 | Stop reason: HOSPADM

## 2022-01-01 RX ORDER — FUROSEMIDE 40 MG/1
40 TABLET ORAL DAILY
Qty: 90 TABLET | Refills: 1 | Status: SHIPPED | OUTPATIENT
Start: 2022-01-01 | End: 2022-01-01 | Stop reason: SDUPTHER

## 2022-01-01 RX ORDER — ATORVASTATIN CALCIUM 20 MG/1
20 TABLET, FILM COATED ORAL NIGHTLY
Qty: 90 TABLET | Refills: 1 | Status: SHIPPED | OUTPATIENT
Start: 2022-01-01 | End: 2022-01-01 | Stop reason: SDUPTHER

## 2022-01-01 RX ORDER — NITROGLYCERIN 0.4 MG/1
0.4 TABLET SUBLINGUAL
Status: DISCONTINUED | OUTPATIENT
Start: 2022-01-01 | End: 2022-01-01 | Stop reason: HOSPADM

## 2022-01-01 RX ORDER — AMOXICILLIN 250 MG
1 CAPSULE ORAL 2 TIMES DAILY PRN
Status: DISCONTINUED | OUTPATIENT
Start: 2022-01-01 | End: 2022-01-01 | Stop reason: HOSPADM

## 2022-01-01 RX ORDER — MAGNESIUM SULFATE HEPTAHYDRATE 40 MG/ML
1 INJECTION, SOLUTION INTRAVENOUS
Status: DISCONTINUED | OUTPATIENT
Start: 2022-01-01 | End: 2022-01-01

## 2022-01-01 RX ORDER — FAMOTIDINE 20 MG/1
20 TABLET, FILM COATED ORAL NIGHTLY
Status: DISCONTINUED | OUTPATIENT
Start: 2022-01-01 | End: 2022-01-01 | Stop reason: HOSPADM

## 2022-01-01 RX ORDER — LISINOPRIL 10 MG/1
10 TABLET ORAL DAILY
Qty: 90 TABLET | Refills: 1
Start: 2022-01-01 | End: 2022-01-01 | Stop reason: SDUPTHER

## 2022-01-01 RX ORDER — ACETAMINOPHEN 160 MG/5ML
650 SOLUTION ORAL EVERY 4 HOURS PRN
Status: DISCONTINUED | OUTPATIENT
Start: 2022-01-01 | End: 2022-01-01 | Stop reason: HOSPADM

## 2022-01-01 RX ORDER — ALLOPURINOL 100 MG/1
100 TABLET ORAL DAILY
Qty: 90 TABLET | Refills: 1 | Status: SHIPPED | OUTPATIENT
Start: 2022-01-01

## 2022-01-01 RX ORDER — ATORVASTATIN CALCIUM 20 MG/1
20 TABLET, FILM COATED ORAL NIGHTLY
Qty: 90 TABLET | Refills: 1 | Status: SHIPPED | OUTPATIENT
Start: 2022-01-01

## 2022-01-01 RX ORDER — LEVOTHYROXINE SODIUM 0.07 MG/1
75 TABLET ORAL DAILY
Qty: 90 TABLET | Refills: 1 | Status: SHIPPED | OUTPATIENT
Start: 2022-01-01

## 2022-01-01 RX ORDER — FUROSEMIDE 40 MG/1
40 TABLET ORAL DAILY
Qty: 180 TABLET | Refills: 1 | Status: SHIPPED | OUTPATIENT
Start: 2022-01-01 | End: 2022-01-01

## 2022-01-01 RX ORDER — LISINOPRIL 20 MG/1
20 TABLET ORAL EVERY 12 HOURS SCHEDULED
Qty: 180 TABLET | Refills: 1 | Status: SHIPPED | OUTPATIENT
Start: 2022-01-01 | End: 2022-01-01

## 2022-01-01 RX ORDER — SODIUM CHLORIDE 0.9 % (FLUSH) 0.9 %
10 SYRINGE (ML) INJECTION AS NEEDED
Status: DISCONTINUED | OUTPATIENT
Start: 2022-01-01 | End: 2022-01-01

## 2022-01-01 RX ORDER — AMLODIPINE BESYLATE 2.5 MG/1
2.5 TABLET ORAL
Status: DISCONTINUED | OUTPATIENT
Start: 2022-01-01 | End: 2022-01-01 | Stop reason: HOSPADM

## 2022-01-01 RX ORDER — CARVEDILOL 12.5 MG/1
12.5 TABLET ORAL 2 TIMES DAILY WITH MEALS
Qty: 180 TABLET | Refills: 1 | Status: CANCELLED | OUTPATIENT
Start: 2022-01-01

## 2022-01-01 RX ORDER — MAGNESIUM SULFATE HEPTAHYDRATE 40 MG/ML
2 INJECTION, SOLUTION INTRAVENOUS ONCE
Status: COMPLETED | OUTPATIENT
Start: 2022-01-01 | End: 2022-01-01

## 2022-01-01 RX ORDER — LISINOPRIL 20 MG/1
20 TABLET ORAL EVERY 12 HOURS SCHEDULED
Status: DISCONTINUED | OUTPATIENT
Start: 2022-01-01 | End: 2022-01-01 | Stop reason: HOSPADM

## 2022-01-01 RX ORDER — FUROSEMIDE 40 MG/1
40 TABLET ORAL DAILY
Qty: 90 TABLET | Refills: 1 | Status: CANCELLED | OUTPATIENT
Start: 2022-01-01

## 2022-01-01 RX ORDER — POTASSIUM CHLORIDE 750 MG/1
20 CAPSULE, EXTENDED RELEASE ORAL 2 TIMES DAILY WITH MEALS
Status: DISCONTINUED | OUTPATIENT
Start: 2022-01-01 | End: 2022-01-01

## 2022-01-01 RX ORDER — POTASSIUM CHLORIDE 750 MG/1
20 CAPSULE, EXTENDED RELEASE ORAL DAILY
Status: DISCONTINUED | OUTPATIENT
Start: 2022-01-01 | End: 2022-01-01 | Stop reason: HOSPADM

## 2022-01-01 RX ORDER — SODIUM CHLORIDE 0.9 % (FLUSH) 0.9 %
10 SYRINGE (ML) INJECTION EVERY 12 HOURS SCHEDULED
Status: DISCONTINUED | OUTPATIENT
Start: 2022-01-01 | End: 2022-01-01 | Stop reason: HOSPADM

## 2022-01-01 RX ORDER — CALCIUM CARBONATE 200(500)MG
2 TABLET,CHEWABLE ORAL 3 TIMES DAILY PRN
Status: DISCONTINUED | OUTPATIENT
Start: 2022-01-01 | End: 2022-01-01 | Stop reason: HOSPADM

## 2022-01-01 RX ORDER — BISACODYL 5 MG/1
5 TABLET, DELAYED RELEASE ORAL DAILY PRN
Status: DISCONTINUED | OUTPATIENT
Start: 2022-01-01 | End: 2022-01-01 | Stop reason: HOSPADM

## 2022-01-01 RX ORDER — BISACODYL 10 MG
10 SUPPOSITORY, RECTAL RECTAL DAILY PRN
Status: DISCONTINUED | OUTPATIENT
Start: 2022-01-01 | End: 2022-01-01 | Stop reason: HOSPADM

## 2022-01-01 RX ORDER — CEFTRIAXONE SODIUM 1 G/50ML
1 INJECTION, SOLUTION INTRAVENOUS DAILY
Status: DISCONTINUED | OUTPATIENT
Start: 2022-01-01 | End: 2022-01-01

## 2022-01-01 RX ORDER — FUROSEMIDE 40 MG/1
TABLET ORAL
Qty: 90 TABLET | Refills: 1 | Status: SHIPPED | OUTPATIENT
Start: 2022-01-01 | End: 2022-01-01 | Stop reason: SDUPTHER

## 2022-01-01 RX ORDER — LEVOTHYROXINE SODIUM 0.1 MG/1
100 TABLET ORAL
Status: DISCONTINUED | OUTPATIENT
Start: 2022-01-01 | End: 2022-01-01 | Stop reason: HOSPADM

## 2022-01-01 RX ORDER — ONDANSETRON 2 MG/ML
4 INJECTION INTRAMUSCULAR; INTRAVENOUS ONCE
Status: COMPLETED | OUTPATIENT
Start: 2022-01-01 | End: 2022-01-01

## 2022-01-01 RX ORDER — LISINOPRIL 20 MG/1
20 TABLET ORAL DAILY
Qty: 90 TABLET | Refills: 1
Start: 2022-01-01 | End: 2022-01-01

## 2022-01-01 RX ORDER — AMLODIPINE BESYLATE 5 MG/1
5 TABLET ORAL
Status: DISCONTINUED | OUTPATIENT
Start: 2022-01-01 | End: 2022-01-01

## 2022-01-01 RX ORDER — PRAVASTATIN SODIUM 20 MG
20 TABLET ORAL DAILY
Qty: 90 TABLET | Refills: 1 | Status: CANCELLED | OUTPATIENT
Start: 2022-01-01

## 2022-01-01 RX ORDER — CHOLECALCIFEROL (VITAMIN D3) 125 MCG
5 CAPSULE ORAL NIGHTLY PRN
Status: DISCONTINUED | OUTPATIENT
Start: 2022-01-01 | End: 2022-01-01 | Stop reason: HOSPADM

## 2022-01-01 RX ORDER — FUROSEMIDE 40 MG/1
40 TABLET ORAL DAILY
Status: DISCONTINUED | OUTPATIENT
Start: 2022-01-01 | End: 2022-01-01 | Stop reason: HOSPADM

## 2022-01-01 RX ORDER — FUROSEMIDE 10 MG/ML
60 INJECTION INTRAMUSCULAR; INTRAVENOUS
Status: DISCONTINUED | OUTPATIENT
Start: 2022-01-01 | End: 2022-01-01

## 2022-01-01 RX ORDER — FUROSEMIDE 10 MG/ML
80 INJECTION INTRAMUSCULAR; INTRAVENOUS ONCE
Status: COMPLETED | OUTPATIENT
Start: 2022-01-01 | End: 2022-01-01

## 2022-01-01 RX ORDER — CARVEDILOL 12.5 MG/1
12.5 TABLET ORAL 2 TIMES DAILY WITH MEALS
Qty: 180 TABLET | Refills: 1 | Status: SHIPPED | OUTPATIENT
Start: 2022-01-01

## 2022-01-01 RX ORDER — POTASSIUM CHLORIDE 750 MG/1
20 CAPSULE, EXTENDED RELEASE ORAL DAILY
Qty: 90 CAPSULE | Refills: 1 | Status: SHIPPED | OUTPATIENT
Start: 2022-01-01 | End: 2022-01-01 | Stop reason: SDUPTHER

## 2022-01-01 RX ORDER — FUROSEMIDE 40 MG/1
40 TABLET ORAL DAILY
Qty: 90 TABLET | Refills: 1 | Status: SHIPPED | OUTPATIENT
Start: 2022-01-01

## 2022-01-01 RX ORDER — ALLOPURINOL 100 MG/1
100 TABLET ORAL DAILY
Status: DISCONTINUED | OUTPATIENT
Start: 2022-01-01 | End: 2022-01-01 | Stop reason: HOSPADM

## 2022-01-01 RX ORDER — ALUMINA, MAGNESIA, AND SIMETHICONE 2400; 2400; 240 MG/30ML; MG/30ML; MG/30ML
15 SUSPENSION ORAL EVERY 6 HOURS PRN
Status: DISCONTINUED | OUTPATIENT
Start: 2022-01-01 | End: 2022-01-01 | Stop reason: HOSPADM

## 2022-01-01 RX ORDER — AMLODIPINE BESYLATE 5 MG/1
5 TABLET ORAL ONCE
Status: COMPLETED | OUTPATIENT
Start: 2022-01-01 | End: 2022-01-01

## 2022-01-01 RX ORDER — SPIRONOLACTONE 25 MG/1
25 TABLET ORAL DAILY
Qty: 90 TABLET | Refills: 1 | Status: CANCELLED | OUTPATIENT
Start: 2022-01-01

## 2022-01-01 RX ORDER — ACETAMINOPHEN 650 MG/1
650 SUPPOSITORY RECTAL EVERY 4 HOURS PRN
Status: DISCONTINUED | OUTPATIENT
Start: 2022-01-01 | End: 2022-01-01 | Stop reason: HOSPADM

## 2022-01-01 RX ORDER — SODIUM CHLORIDE 0.9 % (FLUSH) 0.9 %
10 SYRINGE (ML) INJECTION AS NEEDED
Status: DISCONTINUED | OUTPATIENT
Start: 2022-01-01 | End: 2022-01-01 | Stop reason: HOSPADM

## 2022-01-01 RX ORDER — FUROSEMIDE 40 MG/1
40 TABLET ORAL 2 TIMES DAILY
Qty: 180 TABLET | Refills: 1 | Status: SHIPPED | OUTPATIENT
Start: 2022-01-01 | End: 2022-01-01 | Stop reason: SDUPTHER

## 2022-01-01 RX ORDER — CARVEDILOL 12.5 MG/1
12.5 TABLET ORAL 2 TIMES DAILY WITH MEALS
Qty: 60 TABLET | Refills: 5 | Status: SHIPPED | OUTPATIENT
Start: 2022-01-01 | End: 2022-01-01 | Stop reason: SDUPTHER

## 2022-01-01 RX ORDER — LEVOTHYROXINE SODIUM 0.1 MG/1
TABLET ORAL
Qty: 90 TABLET | Refills: 1 | Status: SHIPPED | OUTPATIENT
Start: 2022-01-01 | End: 2022-01-01 | Stop reason: SDUPTHER

## 2022-01-01 RX ORDER — LISINOPRIL 20 MG/1
20 TABLET ORAL ONCE
Status: COMPLETED | OUTPATIENT
Start: 2022-01-01 | End: 2022-01-01

## 2022-01-01 RX ORDER — LEVOTHYROXINE SODIUM 100 MCG
100 TABLET ORAL DAILY
Qty: 90 TABLET | Refills: 1 | Status: SHIPPED | OUTPATIENT
Start: 2022-01-01 | End: 2022-01-01

## 2022-01-01 RX ORDER — POTASSIUM CHLORIDE 750 MG/1
30 CAPSULE, EXTENDED RELEASE ORAL 2 TIMES DAILY WITH MEALS
Status: DISCONTINUED | OUTPATIENT
Start: 2022-01-01 | End: 2022-01-01

## 2022-01-01 RX ORDER — POTASSIUM CHLORIDE 750 MG/1
20 CAPSULE, EXTENDED RELEASE ORAL 2 TIMES DAILY
Qty: 180 CAPSULE | Refills: 1 | Status: SHIPPED | OUTPATIENT
Start: 2022-01-01 | End: 2022-01-01 | Stop reason: SDUPTHER

## 2022-01-01 RX ORDER — FAMOTIDINE 20 MG/1
20 TABLET, FILM COATED ORAL NIGHTLY
Qty: 90 TABLET | Refills: 1 | Status: SHIPPED | OUTPATIENT
Start: 2022-01-01

## 2022-01-01 RX ORDER — LISINOPRIL 20 MG/1
10 TABLET ORAL DAILY
Qty: 90 TABLET | Refills: 1 | Status: SHIPPED
Start: 2022-01-01 | End: 2022-01-01 | Stop reason: SDUPTHER

## 2022-01-01 RX ORDER — LISINOPRIL 40 MG/1
TABLET ORAL
Qty: 90 TABLET | Refills: 1 | Status: SHIPPED | OUTPATIENT
Start: 2022-01-01 | End: 2022-01-01 | Stop reason: HOSPADM

## 2022-01-01 RX ORDER — ONDANSETRON 2 MG/ML
4 INJECTION INTRAMUSCULAR; INTRAVENOUS EVERY 4 HOURS PRN
Status: DISCONTINUED | OUTPATIENT
Start: 2022-01-01 | End: 2022-01-01 | Stop reason: HOSPADM

## 2022-01-01 RX ADMIN — POTASSIUM CHLORIDE 20 MEQ: 10 CAPSULE, COATED, EXTENDED RELEASE ORAL at 09:27

## 2022-01-01 RX ADMIN — SODIUM CHLORIDE, PRESERVATIVE FREE 10 ML: 5 INJECTION INTRAVENOUS at 21:08

## 2022-01-01 RX ADMIN — FUROSEMIDE 60 MG: 10 INJECTION, SOLUTION INTRAMUSCULAR; INTRAVENOUS at 17:52

## 2022-01-01 RX ADMIN — AMLODIPINE BESYLATE 2.5 MG: 2.5 TABLET ORAL at 09:29

## 2022-01-01 RX ADMIN — AMLODIPINE BESYLATE 2.5 MG: 2.5 TABLET ORAL at 08:28

## 2022-01-01 RX ADMIN — CARVEDILOL 12.5 MG: 12.5 TABLET, FILM COATED ORAL at 08:28

## 2022-01-01 RX ADMIN — LISINOPRIL 20 MG: 20 TABLET ORAL at 21:21

## 2022-01-01 RX ADMIN — APIXABAN 5 MG: 5 TABLET, FILM COATED ORAL at 20:14

## 2022-01-01 RX ADMIN — SODIUM CHLORIDE, PRESERVATIVE FREE 10 ML: 5 INJECTION INTRAVENOUS at 08:28

## 2022-01-01 RX ADMIN — ALLOPURINOL 100 MG: 100 TABLET ORAL at 09:09

## 2022-01-01 RX ADMIN — LISINOPRIL 20 MG: 20 TABLET ORAL at 02:43

## 2022-01-01 RX ADMIN — ALLOPURINOL 100 MG: 100 TABLET ORAL at 09:59

## 2022-01-01 RX ADMIN — SODIUM CHLORIDE, PRESERVATIVE FREE 10 ML: 5 INJECTION INTRAVENOUS at 20:13

## 2022-01-01 RX ADMIN — SODIUM CHLORIDE, PRESERVATIVE FREE 10 ML: 5 INJECTION INTRAVENOUS at 20:29

## 2022-01-01 RX ADMIN — APIXABAN 5 MG: 5 TABLET, FILM COATED ORAL at 20:13

## 2022-01-01 RX ADMIN — FUROSEMIDE 60 MG: 10 INJECTION, SOLUTION INTRAMUSCULAR; INTRAVENOUS at 08:55

## 2022-01-01 RX ADMIN — FUROSEMIDE 60 MG: 10 INJECTION, SOLUTION INTRAMUSCULAR; INTRAVENOUS at 08:16

## 2022-01-01 RX ADMIN — LEVOTHYROXINE SODIUM 100 MCG: 0.1 TABLET ORAL at 09:58

## 2022-01-01 RX ADMIN — APIXABAN 5 MG: 5 TABLET, FILM COATED ORAL at 09:09

## 2022-01-01 RX ADMIN — LISINOPRIL 20 MG: 20 TABLET ORAL at 20:53

## 2022-01-01 RX ADMIN — FUROSEMIDE 60 MG: 10 INJECTION, SOLUTION INTRAMUSCULAR; INTRAVENOUS at 18:31

## 2022-01-01 RX ADMIN — FAMOTIDINE 20 MG: 20 TABLET ORAL at 02:43

## 2022-01-01 RX ADMIN — LEVOTHYROXINE SODIUM 100 MCG: 0.1 TABLET ORAL at 09:05

## 2022-01-01 RX ADMIN — FUROSEMIDE 80 MG: 10 INJECTION, SOLUTION INTRAMUSCULAR; INTRAVENOUS at 22:56

## 2022-01-01 RX ADMIN — AMLODIPINE BESYLATE 2.5 MG: 2.5 TABLET ORAL at 08:15

## 2022-01-01 RX ADMIN — CEFTRIAXONE SODIUM 1 G: 1 INJECTION, SOLUTION INTRAVENOUS at 09:10

## 2022-01-01 RX ADMIN — APIXABAN 5 MG: 5 TABLET, FILM COATED ORAL at 21:08

## 2022-01-01 RX ADMIN — LISINOPRIL 20 MG: 20 TABLET ORAL at 08:36

## 2022-01-01 RX ADMIN — APIXABAN 5 MG: 5 TABLET, FILM COATED ORAL at 08:36

## 2022-01-01 RX ADMIN — APIXABAN 5 MG: 5 TABLET, FILM COATED ORAL at 20:28

## 2022-01-01 RX ADMIN — POTASSIUM CHLORIDE 30 MEQ: 10 CAPSULE, COATED, EXTENDED RELEASE ORAL at 08:28

## 2022-01-01 RX ADMIN — AZITHROMYCIN MONOHYDRATE 500 MG: 500 INJECTION, POWDER, LYOPHILIZED, FOR SOLUTION INTRAVENOUS at 23:06

## 2022-01-01 RX ADMIN — LEVOTHYROXINE SODIUM 100 MCG: 0.1 TABLET ORAL at 08:11

## 2022-01-01 RX ADMIN — POTASSIUM CHLORIDE 30 MEQ: 10 CAPSULE, COATED, EXTENDED RELEASE ORAL at 20:14

## 2022-01-01 RX ADMIN — APIXABAN 5 MG: 5 TABLET, FILM COATED ORAL at 08:28

## 2022-01-01 RX ADMIN — POTASSIUM CHLORIDE 30 MEQ: 10 CAPSULE, COATED, EXTENDED RELEASE ORAL at 17:29

## 2022-01-01 RX ADMIN — ALLOPURINOL 100 MG: 100 TABLET ORAL at 10:05

## 2022-01-01 RX ADMIN — CARVEDILOL 12.5 MG: 12.5 TABLET, FILM COATED ORAL at 02:43

## 2022-01-01 RX ADMIN — FUROSEMIDE 60 MG: 10 INJECTION, SOLUTION INTRAMUSCULAR; INTRAVENOUS at 17:42

## 2022-01-01 RX ADMIN — LISINOPRIL 20 MG: 20 TABLET ORAL at 09:05

## 2022-01-01 RX ADMIN — CARVEDILOL 12.5 MG: 12.5 TABLET, FILM COATED ORAL at 09:58

## 2022-01-01 RX ADMIN — APIXABAN 5 MG: 5 TABLET, FILM COATED ORAL at 20:53

## 2022-01-01 RX ADMIN — CARVEDILOL 12.5 MG: 12.5 TABLET, FILM COATED ORAL at 09:09

## 2022-01-01 RX ADMIN — FAMOTIDINE 20 MG: 20 TABLET ORAL at 20:28

## 2022-01-01 RX ADMIN — HYDROMORPHONE HYDROCHLORIDE 0.5 MG: 1 INJECTION, SOLUTION INTRAMUSCULAR; INTRAVENOUS; SUBCUTANEOUS at 21:12

## 2022-01-01 RX ADMIN — APIXABAN 5 MG: 5 TABLET, FILM COATED ORAL at 21:21

## 2022-01-01 RX ADMIN — LEVOTHYROXINE SODIUM 100 MCG: 0.1 TABLET ORAL at 08:55

## 2022-01-01 RX ADMIN — CARVEDILOL 12.5 MG: 12.5 TABLET, FILM COATED ORAL at 17:40

## 2022-01-01 RX ADMIN — AZITHROMYCIN MONOHYDRATE 500 MG: 500 INJECTION, POWDER, LYOPHILIZED, FOR SOLUTION INTRAVENOUS at 08:38

## 2022-01-01 RX ADMIN — AMLODIPINE BESYLATE 5 MG: 5 TABLET ORAL at 09:05

## 2022-01-01 RX ADMIN — SODIUM CHLORIDE, PRESERVATIVE FREE 10 ML: 5 INJECTION INTRAVENOUS at 20:14

## 2022-01-01 RX ADMIN — APIXABAN 5 MG: 5 TABLET, FILM COATED ORAL at 09:58

## 2022-01-01 RX ADMIN — LISINOPRIL 20 MG: 20 TABLET ORAL at 08:15

## 2022-01-01 RX ADMIN — CEFTRIAXONE SODIUM 1 G: 1 INJECTION, SOLUTION INTRAVENOUS at 11:35

## 2022-01-01 RX ADMIN — LISINOPRIL 20 MG: 20 TABLET ORAL at 20:28

## 2022-01-01 RX ADMIN — SODIUM CHLORIDE, PRESERVATIVE FREE 10 ML: 5 INJECTION INTRAVENOUS at 18:32

## 2022-01-01 RX ADMIN — POTASSIUM CHLORIDE 30 MEQ: 10 CAPSULE, COATED, EXTENDED RELEASE ORAL at 08:55

## 2022-01-01 RX ADMIN — POTASSIUM CHLORIDE 30 MEQ: 10 CAPSULE, COATED, EXTENDED RELEASE ORAL at 09:09

## 2022-01-01 RX ADMIN — LISINOPRIL 20 MG: 20 TABLET ORAL at 09:09

## 2022-01-01 RX ADMIN — AZITHROMYCIN MONOHYDRATE 500 MG: 500 INJECTION, POWDER, LYOPHILIZED, FOR SOLUTION INTRAVENOUS at 12:10

## 2022-01-01 RX ADMIN — FAMOTIDINE 20 MG: 20 TABLET ORAL at 20:14

## 2022-01-01 RX ADMIN — SODIUM CHLORIDE, PRESERVATIVE FREE 10 ML: 5 INJECTION INTRAVENOUS at 09:02

## 2022-01-01 RX ADMIN — LISINOPRIL 20 MG: 20 TABLET ORAL at 08:56

## 2022-01-01 RX ADMIN — SODIUM CHLORIDE, PRESERVATIVE FREE 10 ML: 5 INJECTION INTRAVENOUS at 08:37

## 2022-01-01 RX ADMIN — FUROSEMIDE 60 MG: 10 INJECTION, SOLUTION INTRAMUSCULAR; INTRAVENOUS at 08:28

## 2022-01-01 RX ADMIN — SODIUM CHLORIDE, PRESERVATIVE FREE 10 ML: 5 INJECTION INTRAVENOUS at 08:14

## 2022-01-01 RX ADMIN — SODIUM CHLORIDE, PRESERVATIVE FREE 10 ML: 5 INJECTION INTRAVENOUS at 20:53

## 2022-01-01 RX ADMIN — SODIUM CHLORIDE, PRESERVATIVE FREE 10 ML: 5 INJECTION INTRAVENOUS at 10:00

## 2022-01-01 RX ADMIN — AMLODIPINE BESYLATE 2.5 MG: 2.5 TABLET ORAL at 09:31

## 2022-01-01 RX ADMIN — APIXABAN 5 MG: 5 TABLET, FILM COATED ORAL at 08:23

## 2022-01-01 RX ADMIN — CARVEDILOL 12.5 MG: 12.5 TABLET, FILM COATED ORAL at 17:42

## 2022-01-01 RX ADMIN — CARVEDILOL 12.5 MG: 12.5 TABLET, FILM COATED ORAL at 18:31

## 2022-01-01 RX ADMIN — POTASSIUM CHLORIDE 30 MEQ: 10 CAPSULE, COATED, EXTENDED RELEASE ORAL at 17:52

## 2022-01-01 RX ADMIN — FAMOTIDINE 20 MG: 20 TABLET ORAL at 21:08

## 2022-01-01 RX ADMIN — ONDANSETRON 4 MG: 2 INJECTION INTRAMUSCULAR; INTRAVENOUS at 12:27

## 2022-01-01 RX ADMIN — CARVEDILOL 12.5 MG: 12.5 TABLET, FILM COATED ORAL at 09:28

## 2022-01-01 RX ADMIN — CARVEDILOL 12.5 MG: 12.5 TABLET, FILM COATED ORAL at 08:15

## 2022-01-01 RX ADMIN — LISINOPRIL 20 MG: 20 TABLET ORAL at 20:13

## 2022-01-01 RX ADMIN — LISINOPRIL 20 MG: 20 TABLET ORAL at 08:28

## 2022-01-01 RX ADMIN — APIXABAN 5 MG: 5 TABLET, FILM COATED ORAL at 09:28

## 2022-01-01 RX ADMIN — CARVEDILOL 12.5 MG: 12.5 TABLET, FILM COATED ORAL at 17:29

## 2022-01-01 RX ADMIN — LISINOPRIL 20 MG: 20 TABLET ORAL at 08:12

## 2022-01-01 RX ADMIN — POTASSIUM CHLORIDE 30 MEQ: 10 CAPSULE, COATED, EXTENDED RELEASE ORAL at 18:31

## 2022-01-01 RX ADMIN — FAMOTIDINE 20 MG: 20 TABLET ORAL at 20:40

## 2022-01-01 RX ADMIN — CEFTRIAXONE SODIUM 1 G: 1 INJECTION, SOLUTION INTRAVENOUS at 10:06

## 2022-01-01 RX ADMIN — FUROSEMIDE 40 MG: 40 TABLET ORAL at 09:27

## 2022-01-01 RX ADMIN — AMLODIPINE BESYLATE 2.5 MG: 2.5 TABLET ORAL at 09:09

## 2022-01-01 RX ADMIN — ALLOPURINOL 100 MG: 100 TABLET ORAL at 08:56

## 2022-01-01 RX ADMIN — MAGNESIUM SULFATE 2 G: 2 INJECTION INTRAVENOUS at 08:29

## 2022-01-01 RX ADMIN — LISINOPRIL 20 MG: 20 TABLET ORAL at 09:59

## 2022-01-01 RX ADMIN — FAMOTIDINE 20 MG: 20 TABLET ORAL at 20:12

## 2022-01-01 RX ADMIN — CARVEDILOL 12.5 MG: 12.5 TABLET, FILM COATED ORAL at 08:56

## 2022-01-01 RX ADMIN — FUROSEMIDE 40 MG: 40 TABLET ORAL at 08:12

## 2022-01-01 RX ADMIN — LISINOPRIL 20 MG: 20 TABLET ORAL at 21:08

## 2022-01-01 RX ADMIN — POTASSIUM CHLORIDE 30 MEQ: 10 CAPSULE, COATED, EXTENDED RELEASE ORAL at 08:16

## 2022-01-01 RX ADMIN — LISINOPRIL 20 MG: 20 TABLET ORAL at 20:14

## 2022-01-01 RX ADMIN — CARVEDILOL 12.5 MG: 12.5 TABLET, FILM COATED ORAL at 17:52

## 2022-01-01 RX ADMIN — SODIUM CHLORIDE, PRESERVATIVE FREE 10 ML: 5 INJECTION INTRAVENOUS at 20:40

## 2022-01-01 RX ADMIN — FUROSEMIDE 60 MG: 10 INJECTION, SOLUTION INTRAMUSCULAR; INTRAVENOUS at 18:20

## 2022-01-01 RX ADMIN — SODIUM CHLORIDE, PRESERVATIVE FREE 10 ML: 5 INJECTION INTRAVENOUS at 08:16

## 2022-01-01 RX ADMIN — CARVEDILOL 12.5 MG: 12.5 TABLET, FILM COATED ORAL at 08:12

## 2022-01-01 RX ADMIN — APIXABAN 5 MG: 5 TABLET, FILM COATED ORAL at 08:55

## 2022-01-01 RX ADMIN — AMLODIPINE BESYLATE 5 MG: 5 TABLET ORAL at 22:19

## 2022-01-01 RX ADMIN — APIXABAN 5 MG: 5 TABLET, FILM COATED ORAL at 08:12

## 2022-01-01 RX ADMIN — CARVEDILOL 12.5 MG: 12.5 TABLET, FILM COATED ORAL at 09:05

## 2022-01-01 RX ADMIN — SODIUM CHLORIDE, PRESERVATIVE FREE 10 ML: 5 INJECTION INTRAVENOUS at 09:06

## 2022-01-01 RX ADMIN — AMLODIPINE BESYLATE 2.5 MG: 2.5 TABLET ORAL at 09:59

## 2022-01-01 RX ADMIN — ALLOPURINOL 100 MG: 100 TABLET ORAL at 08:15

## 2022-01-01 RX ADMIN — CEFTRIAXONE SODIUM 1 G: 1 INJECTION, SOLUTION INTRAVENOUS at 22:59

## 2022-01-01 RX ADMIN — LEVOTHYROXINE SODIUM 100 MCG: 0.1 TABLET ORAL at 08:15

## 2022-01-01 RX ADMIN — FUROSEMIDE 60 MG: 10 INJECTION, SOLUTION INTRAMUSCULAR; INTRAVENOUS at 18:16

## 2022-01-01 RX ADMIN — SODIUM CHLORIDE, PRESERVATIVE FREE 10 ML: 5 INJECTION INTRAVENOUS at 08:13

## 2022-01-01 RX ADMIN — AMLODIPINE BESYLATE 5 MG: 5 TABLET ORAL at 08:56

## 2022-01-01 RX ADMIN — CARVEDILOL 12.5 MG: 12.5 TABLET, FILM COATED ORAL at 18:16

## 2022-01-01 RX ADMIN — SODIUM CHLORIDE 1000 ML: 9 INJECTION, SOLUTION INTRAVENOUS at 12:28

## 2022-01-01 RX ADMIN — LEVOTHYROXINE SODIUM 100 MCG: 0.1 TABLET ORAL at 09:09

## 2022-01-01 RX ADMIN — POTASSIUM CHLORIDE 30 MEQ: 10 CAPSULE, COATED, EXTENDED RELEASE ORAL at 18:15

## 2022-01-01 RX ADMIN — LISINOPRIL 20 MG: 20 TABLET ORAL at 09:27

## 2022-01-01 RX ADMIN — SODIUM CHLORIDE, PRESERVATIVE FREE 10 ML: 5 INJECTION INTRAVENOUS at 17:52

## 2022-01-01 RX ADMIN — SODIUM CHLORIDE, PRESERVATIVE FREE 10 ML: 5 INJECTION INTRAVENOUS at 21:21

## 2022-01-01 RX ADMIN — LISINOPRIL 20 MG: 20 TABLET ORAL at 20:40

## 2022-01-01 RX ADMIN — ALLOPURINOL 100 MG: 100 TABLET ORAL at 09:28

## 2022-01-01 RX ADMIN — POTASSIUM CHLORIDE 30 MEQ: 10 CAPSULE, COATED, EXTENDED RELEASE ORAL at 17:40

## 2022-01-01 RX ADMIN — LEVOTHYROXINE SODIUM 100 MCG: 0.1 TABLET ORAL at 09:28

## 2022-01-01 RX ADMIN — FUROSEMIDE 60 MG: 10 INJECTION, SOLUTION INTRAMUSCULAR; INTRAVENOUS at 09:10

## 2022-01-01 RX ADMIN — CEFTRIAXONE SODIUM 1 G: 1 INJECTION, SOLUTION INTRAVENOUS at 09:13

## 2022-01-01 RX ADMIN — FUROSEMIDE 60 MG: 10 INJECTION, SOLUTION INTRAMUSCULAR; INTRAVENOUS at 09:59

## 2022-01-01 RX ADMIN — IOPAMIDOL 100 ML: 755 INJECTION, SOLUTION INTRAVENOUS at 13:27

## 2022-01-01 RX ADMIN — LEVOTHYROXINE SODIUM 100 MCG: 0.1 TABLET ORAL at 08:28

## 2022-01-01 RX ADMIN — FUROSEMIDE 60 MG: 10 INJECTION, SOLUTION INTRAMUSCULAR; INTRAVENOUS at 17:29

## 2022-01-01 RX ADMIN — SODIUM CHLORIDE, PRESERVATIVE FREE 10 ML: 5 INJECTION INTRAVENOUS at 09:27

## 2022-01-01 RX ADMIN — ALLOPURINOL 100 MG: 100 TABLET ORAL at 08:12

## 2022-01-01 RX ADMIN — AZITHROMYCIN MONOHYDRATE 500 MG: 500 INJECTION, POWDER, LYOPHILIZED, FOR SOLUTION INTRAVENOUS at 10:24

## 2022-01-01 RX ADMIN — ALLOPURINOL 100 MG: 100 TABLET ORAL at 09:05

## 2022-01-01 RX ADMIN — FAMOTIDINE 20 MG: 20 TABLET ORAL at 20:53

## 2022-01-01 RX ADMIN — FUROSEMIDE 60 MG: 10 INJECTION, SOLUTION INTRAMUSCULAR; INTRAVENOUS at 08:36

## 2022-01-01 RX ADMIN — ALLOPURINOL 100 MG: 100 TABLET ORAL at 08:28

## 2022-01-01 RX ADMIN — ONDANSETRON 4 MG: 2 INJECTION INTRAMUSCULAR; INTRAVENOUS at 21:11

## 2022-01-01 RX ADMIN — CEFTRIAXONE SODIUM 1 G: 1 INJECTION, SOLUTION INTRAVENOUS at 08:29

## 2022-01-01 RX ADMIN — FAMOTIDINE 20 MG: 20 TABLET ORAL at 21:21

## 2022-01-01 RX ADMIN — POTASSIUM CHLORIDE 30 MEQ: 10 CAPSULE, COATED, EXTENDED RELEASE ORAL at 09:58

## 2022-01-01 RX ADMIN — POTASSIUM CHLORIDE 20 MEQ: 10 CAPSULE, COATED, EXTENDED RELEASE ORAL at 08:11

## 2022-01-01 RX ADMIN — AZITHROMYCIN MONOHYDRATE 500 MG: 500 INJECTION, POWDER, LYOPHILIZED, FOR SOLUTION INTRAVENOUS at 08:29

## 2022-01-01 RX ADMIN — FUROSEMIDE 60 MG: 10 INJECTION, SOLUTION INTRAMUSCULAR; INTRAVENOUS at 09:05

## 2022-01-01 RX ADMIN — LEVOTHYROXINE SODIUM 100 MCG: 0.1 TABLET ORAL at 10:05

## 2022-01-31 PROBLEM — R11.0 NAUSEA: Status: ACTIVE | Noted: 2022-01-01

## 2022-01-31 PROBLEM — J96.21 ACUTE ON CHRONIC RESPIRATORY FAILURE WITH HYPOXIA (HCC): Status: ACTIVE | Noted: 2022-01-01

## 2022-01-31 NOTE — ASSESSMENT & PLAN NOTE
GFR was 35 per his 11/21 labs.  This was fairly stable, but obviously needs repeat labs as of 1/22 urgent visit.

## 2022-01-31 NOTE — ASSESSMENT & PLAN NOTE
Patient remains rate controlled as of 1/22 office visit.  We are switching to carvedilol now given his associated CHF.  Of concerning note, he is not currently on Eliquis, so I will resume this when he is hospitalized later today.

## 2022-01-31 NOTE — PROGRESS NOTES
"Chief Complaint  Nausea (Pt gets nauseated and he has no appetite. He did finally have the over night. I didn't get referred to a cardio due to that message fell through the cracks. He has been falling as well and he is sleeping all the time.  He has been out of both water pills for two weeks. )    Subjective          Tres Jackson presents to [facility]     History of present illness:  Patient is a 80-year-old male with underlying hypertension, hyperlipidemia, and resultant chronic kidney disease stage 3b, and recent diagnosis of congestive heart failure, who was seen 11/21 for 1 month follow-up of CHF, and who is coming in 1/22 for urgent issues as noted in the CC.      Review of Systems   Constitutional: Negative for appetite change, fatigue and fever.   HENT: Negative for congestion and ear pain.    Eyes: Negative for blurred vision.   Respiratory: Negative for cough, chest tightness, shortness of breath and wheezing.    Cardiovascular: Negative for chest pain, palpitations and leg swelling.   Gastrointestinal: Negative for abdominal pain.   Genitourinary: Negative for difficulty urinating, dysuria and hematuria.   Musculoskeletal: Negative for arthralgias and gait problem.   Skin: Negative for skin lesions.   Neurological: Negative for syncope, memory problem and confusion.   Psychiatric/Behavioral: Negative for self-injury and depressed mood.       Objective   Vital Signs:   /90   Pulse 90   Temp 98.2 °F (36.8 °C)   Ht 165.1 cm (65\")   Wt 77 kg (169 lb 12.8 oz)   SpO2 (!) 85%   BMI 28.26 kg/m²           Physical Exam  Vitals and nursing note reviewed.   Constitutional:       General: He is not in acute distress.     Appearance: Normal appearance. He is not toxic-appearing.   HENT:      Head: Atraumatic.      Right Ear: External ear normal.      Left Ear: External ear normal.      Nose: Nose normal.      Mouth/Throat:      Mouth: Mucous membranes are moist. "   Eyes:      General:         Right eye: No discharge.         Left eye: No discharge.      Extraocular Movements: Extraocular movements intact.      Pupils: Pupils are equal, round, and reactive to light.   Neck:      Comments: Chronic trach.  Cardiovascular:      Rate and Rhythm: Normal rate. Rhythm irregular.      Pulses: Normal pulses.      Heart sounds: Normal heart sounds. No murmur heard.  No gallop.       Comments: Irregularly irregular, no S3.  Pulmonary:      Effort: Pulmonary effort is normal. No respiratory distress.      Breath sounds: No wheezing, rhonchi or rales.      Comments: Decreased breath sounds, no rales = ditto 11/21 OV.  Abdominal:      General: There is no distension.      Palpations: Abdomen is soft. There is no mass.      Tenderness: There is no abdominal tenderness. There is no guarding.   Musculoskeletal:         General: No swelling or tenderness.      Cervical back: No tenderness.      Right lower leg: Edema present.      Left lower leg: Edema present.      Comments: 2-3+ bilateral lower extremity edema up to the knees... Edema improved 1+ as of 10/21 off visit---> this level edema is stable as of 11/21 OV.   Skin:     General: Skin is warm and dry.      Findings: No rash.   Neurological:      General: No focal deficit present.      Mental Status: He is alert and oriented to person, place, and time. Mental status is at baseline.      Motor: No weakness.      Gait: Gait normal.   Psychiatric:         Mood and Affect: Mood normal.         Thought Content: Thought content normal.          Result Review :   The following data was reviewed by: Erick Pedroza MD on 09/03/2021:           Assessment and Plan    Diagnoses and all orders for this visit:    1. Essential hypertension (Primary)  Overview:  Blood pressure is up the past 2 office visits, so we need to increase his medications.  I was going to titrate his Tenormin, but on exam he is noted to be in acute heart failure likely secondary  to new onset A. fib.  We will see where his blood pressure goes with diuresis, but probably will end up switching him to Coreg and titrating this.  Just one make 1 medication change at a time with him...Will titrate Tenormin for heart rate and blood pressure control as of 10/21 office---> being switched to Coreg as of 1/22 office visit.    Assessment & Plan:  Blood pressure remains suboptimal as of 1/22.  His wife is here today, so we can be more aggressive with medication adjustments.  She is going to keep a closer eye on these for us now.  Changing to carvedilol now.        2. Stage 3b chronic kidney disease (HCC)  Overview:  34% = still looks dry and I d/w him in detail need to see Renal if no better on RTO; I d/w stop nsaids please...GFR is 49 as of his September 2021 appointment.  This is a nice improvement, he does not need to see the nephrologist now, I will continue to monitor this...GFR was down a bit to 30 on those labs after 10/21 OV.  Corresponding BUN/creatinine were 29 and 2.0.  Need labs soon as of 11/21 OV.      Assessment & Plan:  GFR was 35 per his 11/21 labs.  This was fairly stable, but obviously needs repeat labs as of 1/22 urgent visit.      3. Persistent atrial fibrillation (HCC)  Overview:  This appears to be a new issue = yes, EKG done today 9/3/2021 to evaluate the patient's irregular heartbeat noted on exam confirms this.  The EKG reveals new onset A. 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  Additionally there is an age-indeterminate high lateral MI with new changes noted in leads I and aVL compared with the prior.  There are no active acute ischemic changes identified.  Plan per orders with anticoagulation, diuresis, and close follow-up...Patient's echo with no obvious wall motion abnormalities, so we will address better rate control with his Tenormin and defer changes to Coreg at this time at least.  Continue with Eliquis for  anticoagulation---> continue same meds as of 11/21 OV.  Patient is rate controlled this OV.    Assessment & Plan:  Patient remains rate controlled as of 1/22 office visit.  We are switching to carvedilol now given his associated CHF.  Of concerning note, he is not currently on Eliquis, so I will resume this when he is hospitalized later today.      4. Acute diastolic CHF (congestive heart failure) (HCC)  Overview:  This is a new issue as of his September 2021 appointment.  Hopefully it simply related to the apparent atrial fibrillation.  We will need to start diuretics, get an echo, and follow him up very shortly...Echo 10/21 with normal EF, no concerning valve changes, he does have significant pulmonary hypertension.  Continue with current dose of Lasix, get labs as noted, should be okay to titrate his Tenormin...BNP was 9500 following his 10/21 OV.  BUN/creatinine had trended up some, so need to see results soon as of 11/21 before can make further medication recommendations.    Assessment & Plan:  Patient's weight is down 5 pounds as of his 1/22 urgent visit, however it appears this is related to decreased p.o. intake more so than improved volume control.  This is based on the fact that he is edematous, and has been out of his diuretics for a couple of weeks.  Discussed with patient and his wife in detail today that he needs to be hospitalized to optimize his volume status, and to rapidly correct his hypoxemia which is progressing.  I tried to place directed admit orders, but the hospital has no beds, so the patient is being referred to the ER.      5. Nausea  Assessment & Plan:  Difficult to say what this is related to.  Patient is not having any blood in his stools, no nausea per se, more anorexia.  Likely his electrolytes etc. are way out of whack, he needs comprehensive laboratory studies as of 1/22 office visit..      6. Pulmonary hypertension (HCC)  Overview:  This was noted on the echo.  We are trying to  get overnight O2 sats.  Still do not have this as of 11/21.  We will investigate.    Assessment & Plan:  Patient with significant hypoxia, his O2 sats were 83% on average while asleep, and actually not much higher when awake.  He is presently 85% on room air.  Discussed with the patient and his wife that he needs oxygen 24/7 now, will get this initiated in the hospital as of his 1/22 urgent visit.      --  --  OLDER NOTES:  (D/W HIM ON RTO GERD/STOMACH ISSUES THAT WIFE D/W ME ON MONDAY---> I d/w him 3/21 and he denies it; will repeat CBC on RTO)  --I have called pharmacies in town and they have no record of Prevnar, I have called pt's wife and she feels that he has not received it yet. MB---> I will address this after Covid shots done.    VISIT 6/21:  ANNUAL MEDICARE WELLNESS PHYSICAL 8/20 OV=forms reviewed in room with pt; no new issues raised.  --  HTN well controlled...but f/u off HCTZ...as above; will try increasing ACEI, but may need another agent on RTO...improved...up and down at home...pt here a year later and needs increase meds 8/20...some better 11/20 despite intol to Norvasc 5 mg=edema; will use another later if BP trends higher... stable 3/21---> is up 6/21, so increase meds if same on RTO.  CKD3 stable...35/1.4 (59%)...60/2.0, not drinking as well, some loose stools, no pains/no hematuria/etc, so will just lose the HCTZ for now...25/1.3...43/1.7 is likely related to being dry, so ok to stay on low dose nsaid=1/2 of 600 mg, but will check sooner...26/1.3 is back to baseline...54%...49%...53%...42% is after not being seen past year as of 8/20 OV...50% is nice to see 11/20...43% is ok for now 3/21---> 34% = still looks dry and I d/w him in detail need to see Renal if no better on RTO; I d/w stop nsaids please.  --  DM well controlled and he's comfortable being on actos...remains well controlled...6.1...5.7 is ok since no lows, no sweats/etc = ditto...same=5.8 with no lows as of 8/20 OV...5.5 and will stop  actos now as of 11/20...5.6 off it is great---> 6.1 is fine 6/21.  THYROID stable on 75 qd...stable 9/16 = 2.6...7.2, so increase to 100...1.9 is great...fine 9/18...wnl 4/19---> fine 6/21.  --  CAD with no ischemia...appears stable, but does have some fatigue c/o at 3/18 OV, so will consider SPECT if persists---> no CP/SOB 8/20.  LIPIDS at goal=LDL 78---> 97 is fine 6/21.  --  H/N CA (9/08) s/p laryngectomy and XRT...per Dr Maay q 6 mo.  SKIN CANCER=RUE per Dr Maya; had PET for this and above as of 4/17 OV=neg per report---> seeing Dr Rivera q 2-3 months as of 8/19 OV.  --  GOUT with no recent flare...6.5...6.4---> 6 in 11/20.  VIT B12 DEF=9/18 = 1K OTC to start---> 1500.  --  --  PSA 0.4 on 4/17/17.  COLON not rec given age and no sx's.  Havrix-Hep A 8/18, Shingles 2019; COVID x1 as of 6/21 OV=[employer].  Prevnar rec 9/17; rec Pneumovax if he got prevnar as of 1/19 OV, but he didn't, so go get it now...he can't say 4/19 = we will call; d/w COVID shot needed 3/21.  Flu shot for 2021 season given at his 10/21 office visit.  ([place] scared about being on water alone=trach; Jade/Jamison both here in town).    Follow Up   Return in about 2 weeks (around 2/14/2022).  Patient was given instructions and counseling regarding his condition or for health maintenance advice. Please see specific information pulled into the AVS if appropriate.

## 2022-01-31 NOTE — ASSESSMENT & PLAN NOTE
Patient with significant hypoxia, his O2 sats were 83% on average while asleep, and actually not much higher when awake.  He is presently 85% on room air.  Discussed with the patient and his wife that he needs oxygen 24/7 now, will get this initiated in the hospital as of his 1/22 urgent visit.

## 2022-01-31 NOTE — ASSESSMENT & PLAN NOTE
Blood pressure remains suboptimal as of 1/22.  His wife is here today, so we can be more aggressive with medication adjustments.  She is going to keep a closer eye on these for us now.  Changing to carvedilol now.

## 2022-01-31 NOTE — ASSESSMENT & PLAN NOTE
Difficult to say what this is related to.  Patient is not having any blood in his stools, no nausea per se, more anorexia.  Likely his electrolytes etc. are way out of whack, he needs comprehensive laboratory studies as of 1/22 office visit..

## 2022-01-31 NOTE — ASSESSMENT & PLAN NOTE
Patient's weight is down 5 pounds as of his 1/22 urgent visit, however it appears this is related to decreased p.o. intake more so than improved volume control.  This is based on the fact that he is edematous, and has been out of his diuretics for a couple of weeks.  Discussed with patient and his wife in detail today that he needs to be hospitalized to optimize his volume status, and to rapidly correct his hypoxemia which is progressing.  I tried to place directed admit orders, but the hospital has no beds, so the patient is being referred to the ER.

## 2022-02-01 NOTE — PLAN OF CARE
Goal Outcome Evaluation:  Plan of Care Reviewed With: patient        Progress: no change   On 10 L Trach collar. BP been trending high.

## 2022-02-01 NOTE — H&P
Patient Name: Tres Jackson Date of Admission: 2022   : 1940 MRN: [id number]   Location: [facility]  CSN: [id number]       [facility]   HISTORY AND PHYSICAL  DATE: 2022  Primary Care Provider  Erick Pedroza MD    Subjective   Subjective     Chief Complaint:   Dyspnea.    History of Present Illness:  Tres Jackson is a 81 y.o. male with h/o H/N CA () s/p laryngectomy, longstanding HTN, dx'd with rapid afib and diastolic HF in  and not optimally controlled despite advancing diuretic regimen.  He was also recently dx'd with nocturnal hypoxia with overnight O2 coming back at 83%.  He was to be referred to cardiology for refractory CHF when wife brought him to office yesterday.  She reported that he has been out of his diuretics for 2 weeks and that he wasn't taking them properly prior to that as well. Additionally he was nauseated, not eating much at all, and had fallen several times.  He was in obvious acute HF with decreased BS on R, edema, and sats 85% on RA.  I tried to direct admit him, but there were no beds, so he was sent urgently to the ER.      ER physician contacted me early this AM after pt had been stabilized.  He was found to have BNP 17K, large R effusion, and sats of 79% on RA.  He was given IV abx, IV lasix, and placed on a trach collar.  Orders were placed early AM and pt seen this late afternoon.  He was up in the chair, had trach collar on, he was just reduced from 40 to 30% FiO2.  He did not appear to be in as much distress as yesterday afternoon.  Patient is always pleasant.    Personal History     Past Medical History:   Diagnosis Date   • Atherosclerotic 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 pectoris    • Atrophy of thyroid (acquired)    • Cancer (HCC)     HTM Cancer Dx    • Chronic fatigue, unspecified    • Chronic kidney disease, stage III (moderate) (HCC)    • Dysuria    • Essential (primary) hypertension    • Heart disease     • Hx of radiation therapy     XRT   • Hypothyroidism, unspecified    • Idiopathic gout, unspecified site    • Impaired fasting glucose    • Mixed hyperlipidemia    • Pure hypercholesterolemia    • Type 2 diabetes mellitus with diabetic chronic kidney disease (HCC)        Past Surgical History:   Procedure Laterality Date   • LARYNGECTOMY     • SKIN CANCER EXCISION      ON NOSE, BILATERAL EAR - left ear 3/20   • TUMOR EXCISION      FROM CHEST     LAST OV NOTE 1/31/22:    Assessment and Plan    Diagnoses and all orders for this visit:     1. Essential hypertension (Primary)  Overview:  Blood pressure is up the past 2 office visits, so we need to increase his medications.  I was going to titrate his Tenormin, but on exam he is noted to be in acute heart failure likely secondary to new onset A. fib.  We will see where his blood pressure goes with diuresis, but probably will end up switching him to Coreg and titrating this.  Just one make 1 medication change at a time with him...Will titrate Tenormin for heart rate and blood pressure control as of 10/21 office---> being switched to Coreg as of 1/22 office visit.     Assessment & Plan:  Blood pressure remains suboptimal as of 1/22.  His wife is here today, so we can be more aggressive with medication adjustments.  She is going to keep a closer eye on these for us now.  Changing to carvedilol now.           2. Stage 3b chronic kidney disease (HCC)  Overview:  34% = still looks dry and I d/w him in detail need to see Renal if no better on RTO; I d/w stop nsaids please...GFR is 49 as of his September 2021 appointment.  This is a nice improvement, he does not need to see the nephrologist now, I will continue to monitor this...GFR was down a bit to 30 on those labs after 10/21 OV.  Corresponding BUN/creatinine were 29 and 2.0.  Need labs soon as of 11/21 OV.        Assessment & Plan:  GFR was 35 per his 11/21 labs.  This was fairly stable, but obviously needs repeat labs as  of 1/22 urgent visit.        3. Persistent atrial fibrillation (HCC)  Overview:  This appears to be a new issue = yes, EKG done today 9/3/2021 to evaluate the patient's irregular heartbeat noted on exam confirms this.  The EKG reveals new onset A. 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  Additionally there is an age-indeterminate high lateral MI with new changes noted in leads I and aVL compared with the prior.  There are no active acute ischemic changes identified.  Plan per orders with anticoagulation, diuresis, and close follow-up...Patient's echo with no obvious wall motion abnormalities, so we will address better rate control with his Tenormin and defer changes to Coreg at this time at least.  Continue with Eliquis for anticoagulation---> continue same meds as of 11/21 OV.  Patient is rate controlled this OV.     Assessment & Plan:  Patient remains rate controlled as of 1/22 office visit.  We are switching to carvedilol now given his associated CHF.  Of concerning note, he is not currently on Eliquis, so I will resume this when he is hospitalized later today.        4. Acute diastolic CHF (congestive heart failure) (HCC)  Overview:  This is a new issue as of his September 2021 appointment.  Hopefully it simply related to the apparent atrial fibrillation.  We will need to start diuretics, get an echo, and follow him up very shortly...Echo 10/21 with normal EF, no concerning valve changes, he does have significant pulmonary hypertension.  Continue with current dose of Lasix, get labs as noted, should be okay to titrate his Tenormin...BNP was 9500 following his 10/21 OV.  BUN/creatinine had trended up some, so need to see results soon as of 11/21 before can make further medication recommendations.     Assessment & Plan:  Patient's weight is down 5 pounds as of his 1/22 urgent visit, however it appears this is related to decreased p.o. intake more so than improved  volume control.  This is based on the fact that he is edematous, and has been out of his diuretics for a couple of weeks.  Discussed with patient and his wife in detail today that he needs to be hospitalized to optimize his volume status, and to rapidly correct his hypoxemia which is progressing.  I tried to place directed admit orders, but the hospital has no beds, so the patient is being referred to the ER.        5. Nausea  Assessment & Plan:  Difficult to say what this is related to.  Patient is not having any blood in his stools, no nausea per se, more anorexia.  Likely his electrolytes etc. are way out of whack, he needs comprehensive laboratory studies as of 1/22 office visit..        6. Pulmonary hypertension (HCC)  Overview:  This was noted on the echo.  We are trying to get overnight O2 sats.  Still do not have this as of 11/21.  We will investigate.     Assessment & Plan:  Patient with significant hypoxia, his O2 sats were 83% on average while asleep, and actually not much higher when awake.  He is presently 85% on room air.  Discussed with the patient and his wife that he needs oxygen 24/7 now, will get this initiated in the hospital as of his 1/22 urgent visit.        --  --  OLDER NOTES:  (D/W HIM ON RTO GERD/STOMACH ISSUES THAT WIFE D/W ME ON MONDAY---> I d/w him 3/21 and he denies it; will repeat CBC on RTO)  --I have called pharmacies in town and they have no record of Prevnar, I have called pt's wife and she feels that he has not received it yet. MB---> I will address this after Covid shots done.     VISIT 6/21:  ANNUAL MEDICARE WELLNESS PHYSICAL 8/20 OV=forms reviewed in room with pt; no new issues raised.  --  HTN well controlled...but f/u off HCTZ...as above; will try increasing ACEI, but may need another agent on RTO...improved...up and down at home...pt here a year later and needs increase meds 8/20...some better 11/20 despite intol to Norvasc 5 mg=edema; will use another later if BP trends  higher... stable 3/21---> is up 6/21, so increase meds if same on RTO.  CKD3 stable...35/1.4 (59%)...60/2.0, not drinking as well, some loose stools, no pains/no hematuria/etc, so will just lose the HCTZ for now...25/1.3...43/1.7 is likely related to being dry, so ok to stay on low dose nsaid=1/2 of 600 mg, but will check sooner...26/1.3 is back to baseline...54%...49%...53%...42% is after not being seen past year as of 8/20 OV...50% is nice to see 11/20...43% is ok for now 3/21---> 34% = still looks dry and I d/w him in detail need to see Renal if no better on RTO; I d/w stop nsaids please.  --  DM well controlled and he's comfortable being on actos...remains well controlled...6.1...5.7 is ok since no lows, no sweats/etc = ditto...same=5.8 with no lows as of 8/20 OV...5.5 and will stop actos now as of 11/20...5.6 off it is great---> 6.1 is fine 6/21.  THYROID stable on 75 qd...stable 9/16 = 2.6...7.2, so increase to 100...1.9 is great...fine 9/18...wnl 4/19---> fine 6/21.  --  CAD with no ischemia...appears stable, but does have some fatigue c/o at 3/18 OV, so will consider SPECT if persists---> no CP/SOB 8/20.  LIPIDS at goal=LDL 78---> 97 is fine 6/21.  --  H/N CA (9/08) s/p laryngectomy and XRT...per Dr Maya q 6 mo.  SKIN CANCER=RUE per Dr Maya; had PET for this and above as of 4/17 OV=neg per report---> seeing Dr Rivera q 2-3 months as of 8/19 OV.  --  GOUT with no recent flare...6.5...6.4---> 6 in 11/20.  VIT B12 DEF=9/18 = 1K OTC to start---> 1500.  --  --  PSA 0.4 on 4/17/17.  COLON not rec given age and no sx's.  Havrix-Hep A 8/18, Shingles 2019; COVID x1 as of 6/21 OV=[employer].  Prevnar rec 9/17; rec Pneumovax if he got prevnar as of 1/19 OV, but he didn't, so go get it now...he can't say 4/19 = we will call; d/w COVID shot needed 3/21.  Flu shot for 2021 season given at his 10/21 office visit.  ([place] scared about being on water alone=trach; Jade/Jamison both here in  town).    Social History    reports that he has quit smoking. He has never used smokeless tobacco. He reports current alcohol use. He reports that he does not use drugs.    Family History  family history is not on file. Otherwise pertinent FHx was reviewed and not pertinent to current issue.    Allergies  No Known Allergies    Home Medications  allopurinol, furosemide, levothyroxine, lisinopril, and pravastatin    Objective   Objective     ROS    Review of Systems   Constitutional: Positive for appetite change, fatigue and unexpected weight change. Negative for fever.   HENT: Positive for congestion. Negative for dental problem, ear pain and trouble swallowing.    Eyes: Negative for pain and visual disturbance.   Respiratory: Positive for cough and shortness of breath.    Cardiovascular: Positive for palpitations and leg swelling. Negative for chest pain.   Gastrointestinal: Positive for nausea. Negative for abdominal pain, blood in stool, diarrhea and vomiting.   Endocrine: Negative for cold intolerance and heat intolerance.   Genitourinary: Negative for dysuria, flank pain and hematuria.   Musculoskeletal: Positive for arthralgias and gait problem. Negative for neck pain.        Falls x several.   Skin: Positive for pallor. Negative for rash and wound.   Allergic/Immunologic: Negative for immunocompromised state.   Neurological: Positive for weakness. Negative for dizziness, syncope and headaches.   Psychiatric/Behavioral: Negative for agitation, confusion, self-injury and suicidal ideas.       Exam    Vitals Signs    Temp:  [97.6 °F (36.4 °C)-98.4 °F (36.9 °C)] 98.3 °F (36.8 °C)  Heart Rate:  [] 86  Resp:  [18-22] 20  BP: (147-188)/() 170/94  Flow (L/min):  [10] 10     Physical Exam  Vitals and nursing note reviewed.   Constitutional:       General: He is not in acute distress.     Appearance: Normal appearance. He is ill-appearing. He is not toxic-appearing.   HENT:      Head: Atraumatic.      Nose:  Nose normal.      Mouth/Throat:      Mouth: Mucous membranes are moist.   Eyes:      General:         Right eye: No discharge.         Left eye: No discharge.      Extraocular Movements: Extraocular movements intact.      Pupils: Pupils are equal, round, and reactive to light.   Cardiovascular:      Rate and Rhythm: Normal rate. Rhythm irregular.      Pulses: Normal pulses.      Heart sounds: Normal heart sounds. No murmur heard.  No gallop.       Comments: Irr, irreg.  Pulmonary:      Effort: Pulmonary effort is normal. No respiratory distress.      Breath sounds: Rhonchi and rales present. No wheezing.      Comments: Decreased BS on R half way up.  Abdominal:      General: There is no distension.      Palpations: Abdomen is soft. There is no mass.      Tenderness: There is no abdominal tenderness. There is no guarding.   Musculoskeletal:         General: No swelling or tenderness.      Cervical back: No tenderness.      Right lower leg: Edema present.      Left lower leg: Edema present.      Comments: 2+ edema up into post thighs.   Skin:     General: Skin is warm and dry.      Findings: No rash.   Neurological:      General: No focal deficit present.      Mental Status: He is alert and oriented to person, place, and time. Mental status is at baseline.      Motor: Weakness present.      Gait: Gait abnormal.      Comments: Generally weak, no focal motor deficits.   Psychiatric:         Mood and Affect: Mood normal.         Thought Content: Thought content normal.      Comments: Patient always pleasant.          Labs:        Lab 01/31/22  1819   WBC 8.21   HEMOGLOBIN 16.3   HEMATOCRIT 49.9   PLATELETS 142           Lab 02/01/22  0602   SODIUM 138   POTASSIUM 3.8   CHLORIDE 96*   CO2 27.9   BUN 26*       Images:    CT Chest Without Contrast Diagnostic    Result Date: 2/1/2022  PROCEDURE: CT CHEST WO CONTRAST DIAGNOSTIC  COMPARISON: [facility], CR, XR CHEST 1 VW, 1/31/2022, 20:05.   INDICATIONS: Pneumonia, effusion, or abscess suspected, xray done.  Shortness of breath and weakness.  Hx trach.  TECHNIQUE: CT images were created without the administration of contrast material.   PROTOCOL:   Standard imaging protocol performed    RADIATION:   DLP: 229mGy*cm   Automated exposure control was utilized to minimize radiation dose.  FINDINGS:  A tracheostomy is noted.  There is interlobular septal thickening suggesting interstitial pulmonary edema.  There is a large right pleural effusion with right basilar atelectasis.  There is a small left pleural effusion with left basilar atelectasis.  The heart is enlarged, with partially calcified atherosclerotic disease in the coronary arteries.  The great vessels are normal in caliber.  No abnormally enlarged lymph nodes are identified.  Partial evaluation of the upper abdomen demonstrates cholelithiasis.  No aggressive osseous lesions are identified.      Impression:   1. Interlobular septal thickening, suggesting interstitial pulmonary edema. 2. Large right and small left pleural effusions with bibasilar atelectasis.  A superimposed component of pneumonia is possible. 3. Cardiomegaly. 4. Cholelithiasis.      SAMI ZAMUDIO MD       Electronically Signed and Approved By: SAMI ZAMUDIO MD on 2/01/2022 at 13:20             XR Chest 1 View    Result Date: 1/31/2022  PROCEDURE: XR CHEST 1 VW  COMPARISON: [facility], , CHEST PA/AP & LAT 2V, 4/17/2020, 16:05.  INDICATIONS: SHORT OF BREATH  FINDINGS:   The cardiac silhouette is enlarged.  There is a large amount of consolidation/effusion in the right hemithorax.  No evidence of pneumothorax.  IMPRESSION: Enlarged cardiac silhouette.  Large amount of effusion/consolidation in the right hemithorax.   JESSICA KAUFFMAN MD       Electronically Signed and Approved By: JESSICA KAUFFMAN MD on 1/31/2022 at 20:30               Assessment / Plan     Impression    1. A/C Diastolic HF: As noted, this appears  to be more so related to the patient noncompliance with the diuretics as to any acute coronary insult.  Patient without any ischemic type chest pains, enzymes are negative.  His BNP is up from around 8000 to 17,000 at the time of admission. We will continue with IV diuretics and follow-up labs daily.    2. Persistent AFIB: Patient was switched from atenolol to Coreg given the above. We will likely titrate dose shortly given his persistent hypertension. He had been noncompliant with his Eliquis as well, we gave him a dose this morning, but I am holding that going forward given his need for thoracentesis.    3. A/C Hypoxic Resp Failure: Appears multi factorial. Secondary to the CHF above as well as the pleural effusion noted below. He was on no home O2, currently requiring somewhere between 30 to 40% trach collar. Hopefully thoracentesis will significantly improve his oxygenation as well as continued diuresis and empiric antibiotics.    4. R Pleural Effusion: Was confirmed on chest CT today and pulmonary has been notified. As noted he only received 1 dose of Eliquis earlier this morning, it since been discontinued.    5. Possible CAP: Rocephin/Zithromax #2. We will follow up any culture sent, but otherwise treat for full course.    6. Hypertension. This is curiously uncontrolled at present, despite several doses of his routine meds. Have room to advance carvedilol, but he was just started on this yesterday. Will give him a few doses of amlodipine, and certainly back off on this if blood pressure improves with further diuresis.    7. CKD 3b. GFR of 48 on admission is actually better than his baseline, and may be related to volume overload. He typically has his labs done at an outside facility. Will monitor labs daily.    8. Persistent Nausea/Anorexia: This appears to be improving already with treatment of his above conditions. Made is been related to significant hypoxia.    9. Gait Dysfunction/Recurrent Falls: OT/PT  have been consulted. Hopefully will be steadier on his feet with improved oxygenation. He is fallen several times at home.    10. DVT Prophylaxis: Via mechanical means while we are awaiting thoracentesis.    Tlyer. PUD Prophylaxis: Via H2 blocker.    Problem List    Active Problems:    Acute on chronic respiratory failure with hypoxia (HCC)      Plan:  as above    DVT prophylaxis: Via Eliquis.    CODE STATUS:    Code Status and Medical Interventions:   Ordered at: 02/01/22 0628     Level Of Support Discussed With:    Patient     Code Status (Patient has no pulse and is not breathing):    CPR (Attempt to Resuscitate)     Medical Interventions (Patient has pulse or is breathing):    Full Support       Electronically signed by Erick Pedroza MD, 2/1/2022, 20:55 EST.

## 2022-02-01 NOTE — PLAN OF CARE
Goal Outcome Evaluation:               Patient is alert and oriented, 9L high flow via trach, VSS, no complaints of pain. Cassidy Gabehart, RN

## 2022-02-01 NOTE — THERAPY EVALUATION
Patient Name: Tres Jackson  : 1940    MRN: [id number]                              Today's Date: 2022       Admit Date: 2022    Visit Dx:     ICD-10-CM ICD-9-CM   1. Acute on chronic respiratory failure with hypoxia (HCC)  J96.21 518.84     799.02   2. Acute diastolic CHF (congestive heart failure) (HCC)  I50.31 428.31     428.0   3. Pleural effusion on right  J90 511.9   4. Acute pulmonary edema (HCC)  J81.0 518.4   5. Chronic renal impairment, unspecified CKD stage  N18.9 585.9   6. Atrial fibrillation, unspecified type ([facility])  I48.91 427.31   7. Pneumonia of right lower lobe due to infectious organism  J18.9 486   8. Difficulty walking  R26.2 719.7   9. Decreased activities of daily living (ADL)  Z78.9 V49.89     Patient Active Problem List   Diagnosis   • Malignant neoplasm of ear, nose, and throat   • Tinea unguium   • Essential hypertension   • Mixed hyperlipidemia   • IFG (impaired fasting glucose)   • Hypothyroidism due to acquired atrophy of thyroid   • Stage 3b chronic kidney disease (HCC)   • Vitamin B12 deficiency   • Other fatigue   • Persistent atrial fibrillation (HCC)   • Acute diastolic CHF (congestive heart failure) ([facility])   • Idiopathic chronic gout of foot without tophus   • Atherosclerotic 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 pectoris   • Pulmonary hypertension (HCC)   • Nausea   • Acute on chronic respiratory failure with hypoxia (HCC)     Past Medical History:   Diagnosis Date   • Atherosclerotic 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 pectoris    • Atrophy of thyroid (acquired)    • Cancer (HCC)     HTM Cancer Dx    • Chronic fatigue, unspecified    • Chronic kidney disease, stage III (moderate) (HCC)    • Dysuria    • Essential (primary) hypertension    • Heart disease    • Hx of radiation therapy     XRT   • Hypothyroidism, unspecified    • Idiopathic gout, unspecified site    • Impaired fasting glucose    • Mixed hyperlipidemia    •  Pure hypercholesterolemia    • Type 2 diabetes mellitus with diabetic chronic kidney disease (HCC)      Past Surgical History:   Procedure Laterality Date   • LARYNGECTOMY     • SKIN CANCER EXCISION      ON NOSE, BILATERAL EAR - left ear 3/20   • TUMOR EXCISION      FROM CHEST      General Information     [facility] Name 02/01/22 1442          OT Time and Intention    Document Type evaluation  -     Mode of Treatment individual therapy; occupational therapy  -[facility] Name 02/01/22 1442          General Information    Patient Profile Reviewed yes  -     Prior Level of Function --  Independent with ADLs, ambulated without a device, has a walk-in shower where he stands to shower with shower chair available, e.commode, stands to groom, drives, and no home O2.  -     Existing Precautions/Restrictions fall  -     Barriers to Rehab none identified  -[facility] Name 02/01/22 1442          Occupational Profile    Reason for Services/Referral (Occupational Profile) Occupational therapy consulted due to recent decline in ADLs/functional transfers. No previous occupational therapy services for current condition.  -[facility] Name 02/01/22 1442          Living Environment    Lives With spouse  -[facility] Name 02/01/22 1442          Home Main Entrance    Number of Stairs, Main Entrance three  from the garage  -     Stair Railings, Main Entrance none  -[facility] Name 02/01/22 1442          Stairs Within Home, Primary    Number of Stairs, Within Home, Primary nine  to the basement where patient sleeps and spends most of his time  -     Stair Railings, Within Home, Primary railing on right side (ascending)  -[facility] Name 02/01/22 1442          Cognition    Orientation Status (Cognition) oriented x 3  -[facility] Name 02/01/22 1442          Safety Issues, Functional Mobility    Impairments Affecting Function (Mobility) endurance/activity tolerance; shortness of breath  -     Comment, Safety Issues/Impairments  (Mobility) Therapeutic exercises to address endurance.  -           User Key  (r) = Recorded By, (t) = Taken By, (c) = Cosigned By    Initials Name Provider Type     Aruna Groves OT Occupational Therapist                 Mobility/ADL's     Row Name 02/01/22 1445          Bed Mobility    Comment (Bed Mobility) Patient upright and seated in recliner upon therapist arrival.  -[facility] Name 02/01/22 1445          Transfers    Transfers sit-stand transfer  -     Sit-Stand [place] (Transfers) supervision  -LF     Row Name 02/01/22 1445          Sit-Stand Transfer    Assistive Device (Sit-Stand Transfers) other (see comments)  no device  -[facility] Name 02/01/22 1445          Activities of Daily Living    BADL Assessment/Intervention bathing; lower body dressing; upper body dressing; grooming; feeding; toileting  -[facility] Name 02/01/22 1445          Bathing Assessment/Intervention    [place] Level (Bathing) bathing skills; upper body; set up; lower body; supervision; standby assist  -LF     Row Name 02/01/22 1445          Upper Body Dressing Assessment/Training    [place] Level (Upper Body Dressing) upper body dressing skills; set up  -LF     Row Name 02/01/22 1445          Lower Body Dressing Assessment/Training    [place] Level (Lower Body Dressing) lower body dressing skills; supervision; standby assist  -LF     Row Name 02/01/22 1445          Grooming Assessment/Training    [place] Level (Grooming) grooming skills; set up  -LF     Row Name 02/01/22 1445          Self-Feeding Assessment/Training    [place] Level (Feeding) feeding skills; set up  -LF     Row Name 02/01/22 1445          Toileting Assessment/Training    [place] Level (Toileting) toileting skills; supervision; standby assist  -           User Key  (r) = Recorded By, (t) = Taken By, (c) = Cosigned By    Initials Name Provider Type    Aruna Bojorquez OT Occupational Therapist                Obj/Interventions     Row Name 02/01/22 1446          Sensory Assessment (Somatosensory)    Sensory Assessment (Somatosensory) UE sensation intact  -LF     Row Name 02/01/22 1446          Vision Assessment/Intervention    Visual Impairment/Limitations WFL  -LF     Row Name 02/01/22 1446          Range of Motion Comprehensive    General Range of Motion bilateral upper extremity ROM WFL  -LF     Row Name 02/01/22 1446          Strength Comprehensive (MMT)    Comment, General Manual Muscle Testing (MMT) Assessment 5/5 bilateral upper extremities  -LF     Row Name 02/01/22 1446          Motor Skills    Motor Skills coordination; functional endurance  -LF     Coordination WFL  -     Functional Endurance Fair  -LF     Row Name 02/01/22 1446          Balance    Balance Assessment sitting dynamic balance; standing dynamic balance  -LF     Dynamic Sitting Balance WFL; unsupported; sitting in chair  -LF     Dynamic Standing Balance WFL; unsupported; standing  -LF           User Key  (r) = Recorded By, (t) = Taken By, (c) = Cosigned By    Initials Name Provider Type     Aruna Groves, OT Occupational Therapist               Goals/Plan     Row Name 02/01/22 1447          Bed Mobility Goal 1 (OT)    Activity/Assistive Device (Bed Mobility Goal 1, OT) bed mobility activities, all  -LF     [place] Level/Cues Needed (Bed Mobility Goal 1, OT) independent  -LF     Time Frame (Bed Mobility Goal 1, OT) long term goal (LTG); 10 days  -LF     Row Name 02/01/22 1447          Transfer Goal 1 (OT)    Activity/Assistive Device (Transfer Goal 1, OT) transfers, all  -LF     [place] Level/Cues Needed (Transfer Goal 1, OT) independent  -LF     Time Frame (Transfer Goal 1, OT) long term goal (LTG); 10 days  -LF     Row Name 02/01/22 1447          Bathing Goal 1 (OT)    Activity/Device (Bathing Goal 1, OT) bathing skills, all  -LF     [place] Level/Cues Needed (Bathing Goal 1, OT) independent  -LF     Time Frame (Bathing Goal  1, OT) long term goal (LTG); 10 days  -LF     Row Name 02/01/22 1447          Dressing Goal 1 (OT)    Activity/Device (Dressing Goal 1, OT) dressing skills, all  -LF     [place]/Cues Needed (Dressing Goal 1, OT) independent  -LF     Time Frame (Dressing Goal 1, OT) long term goal (LTG); 10 days  -LF     Row Name 02/01/22 1447          Toileting Goal 1 (OT)    Activity/Device (Toileting Goal 1, OT) toileting skills, all  -LF     [place] Level/Cues Needed (Toileting Goal 1, OT) independent  -LF     Time Frame (Toileting Goal 1, OT) long term goal (LTG); 10 days  -LF     Row Name 02/01/22 1447          Therapy Assessment/Plan (OT)    Planned Therapy Interventions (OT) activity tolerance training; patient/caregiver education/training; BADL retraining; functional balance retraining; occupation/activity based interventions; transfer/mobility retraining  -           User Key  (r) = Recorded By, (t) = Taken By, (c) = Cosigned By    Initials Name Provider Type     Aruna Groves, OT Occupational Therapist               Clinical Impression     Row Name 02/01/22 1447          Pain Assessment    Additional Documentation Pain Scale: FACES Pre/Post-Treatment (Group)  -     Row Name 02/01/22 1447          Pain Scale: FACES Pre/Post-Treatment    Pain: FACES Scale, Pretreatment 0-->no hurt  -     Posttreatment Pain Rating 0-->no hurt  -LF     Row Name 02/01/22 1447          Plan of Care Review    Plan of Care Reviewed With patient; spouse  -     Progress no change  -     Outcome Summary Patient presents with limitations in self-care and endurance.  would benefit from continued skilled occupational therapy services to maximize ADL performance and return home safely and independently.  -     Row Name 02/01/22 1447          Therapy Assessment/Plan (OT)    Patient/Family Therapy Goal Statement (OT) To maximize independence.  -LF     Rehab Potential (OT) good, to achieve stated therapy goals  -     Criteria  for Skilled Therapeutic Interventions Met (OT) yes; meets criteria; skilled treatment is necessary  -LF     Therapy Frequency (OT) 5 times/wk  -LF     Row Name 02/01/22 1447          Therapy Plan Review/Discharge Plan (OT)    Anticipated Discharge Disposition (OT) home with home health; home with assist  -     Row Name 02/01/22 1447          Vital Signs    O2 Delivery Pre Treatment trach collar  -LF     O2 Delivery Intra Treatment trach collar  -LF     O2 Delivery Post Treatment trach collar  -LF           User Key  (r) = Recorded By, (t) = Taken By, (c) = Cosigned By    Initials Name Provider Type    LF Aruna Groves, JESSICA Occupational Therapist               Outcome Measures     Row Name 02/01/22 1448          How much help from another is currently needed...    Putting on and taking off regular lower body clothing? 3  -LF     Bathing (including washing, rinsing, and drying) 3  -LF     Toileting (which includes using toilet bed pan or urinal) 3  -LF     Putting on and taking off regular upper body clothing 4  -LF     Taking care of personal grooming (such as brushing teeth) 4  -LF     Eating meals 4  -LF     AM-PAC 6 Clicks Score (OT) 21  -LF     Row Name 02/01/22 1400 02/01/22 0715       How much help from another person do you currently need...    Turning from your back to your side while in flat bed without using bedrails? 4  -DP 4  -CG    Moving from lying on back to sitting on the side of a flat bed without bedrails? 4  -DP 3  -CG    Moving to and from a bed to a chair (including a wheelchair)? 4  -DP 2  -CG    Standing up from a chair using your arms (e.g., wheelchair, bedside chair)? 4  -DP 2  -CG    Climbing 3-5 steps with a railing? 3  -DP 1  -CG    To walk in hospital room? 3  -DP 2  -CG    AM-PAC 6 Clicks Score (PT) 22  -DP 14  -CG    Row Name 02/01/22 0253          How much help from another person do you currently need...    Turning from your back to your side while in flat bed without using  bedrails? 4  -RG     Moving from lying on back to sitting on the side of a flat bed without bedrails? 3  -RG     Moving to and from a bed to a chair (including a wheelchair)? 2  -RG     Standing up from a chair using your arms (e.g., wheelchair, bedside chair)? 2  -RG     Climbing 3-5 steps with a railing? 1  -RG     To walk in hospital room? 2  -RG     AM-PAC 6 Clicks Score (PT) 14  -RG     Row Name 02/01/22 1448 02/01/22 1400       Functional Assessment    Outcome Measure Options AM-PAC 6 Clicks Daily Activity (OT); Optimal Instrument  -LF AM-PAC 6 Clicks Basic Mobility (PT)  -DP    Row Name 02/01/22 1448          Optimal Instrument    Optimal Instrument Optimal - 3  -LF     Bending/Stooping 2  -LF     Standing 2  -LF     Reaching 1  -LF     From the list, choose the 3 activities you would most like to be able to do without any difficulty Bending/stooping; Standing; Reaching  -LF     Total Score Optimal - 3 5  -LF           User Key  (r) = Recorded By, (t) = Taken By, (c) = Cosigned By    Initials Name Provider Type    CG Gabehart, Cassidy, RN Registered Nurse    LF Aruna Groves OT Occupational Therapist    Zion Andersen, PT Physical Therapist    RG Sunil Nash RNA Registered Nurse                Occupational Therapy Education                 Title: PT OT SLP Therapies (In Progress)     Topic: Occupational Therapy (In Progress)     Point: ADL training (Done)     Description:   Instruct learner(s) on proper safety adaptation and remediation techniques during self care or transfers.   Instruct in proper use of assistive devices.              Learning Progress Summary           Patient Acceptance, E,TB, VU by  at 2/1/2022 1448   Significant Other Acceptance, E,TB, VU by  at 2/1/2022 1448                   Point: Home exercise program (Not Started)     Description:   Instruct learner(s) on appropriate technique for monitoring, assisting and/or progressing therapeutic exercises/activities.               Learner Progress:  Not documented in this visit.          Point: Precautions (Done)     Description:   Instruct learner(s) on prescribed precautions during self-care and functional transfers.              Learning Progress Summary           Patient Acceptance, E,TB, VU by  at 2/1/2022 1448   Significant Other Acceptance, E,TB, VU by LF at 2/1/2022 1448                   Point: Body mechanics (Done)     Description:   Instruct learner(s) on proper positioning and spine alignment during self-care, functional mobility activities and/or exercises.              Learning Progress Summary           Patient Acceptance, E,TB, VU by LF at 2/1/2022 1448   Significant Other Acceptance, E,TB, VU by LF at 2/1/2022 1448                               User Key     Initials Effective Dates Name Provider Type Discipline     06/16/21 -  Aruna Groves OT Occupational Therapist OT              OT Recommendation and Plan  Planned Therapy Interventions (OT): activity tolerance training, patient/caregiver education/training, BADL retraining, functional balance retraining, occupation/activity based interventions, transfer/mobility retraining  Therapy Frequency (OT): 5 times/wk  Plan of Care Review  Plan of Care Reviewed With: patient, spouse  Progress: no change  Outcome Summary: Patient presents with limitations in self-care and endurance.  would benefit from continued skilled occupational therapy services to maximize ADL performance and return home safely and independently.     Time Calculation:    Time Calculation- OT     Row Name 02/01/22 1449             Time Calculation- OT    OT Received On 02/01/22  -LF      OT Goal Re-Cert Due Date 02/10/22  -LF              Untimed Charges    OT Eval/Re-eval Minutes 34  -LF              Total Minutes    Untimed Charges Total Minutes 34  -LF       Total Minutes 34  -LF            User Key  (r) = Recorded By, (t) = Taken By, (c) = Cosigned By    Initials Name Provider Type     Germain  JESSICA Valdovinos Occupational Therapist              Therapy Charges for Today     Code Description Service Date Service Provider Modifiers Qty    20928191057 HC OT EVAL LOW COMPLEXITY 3 2/1/2022 Aruna Groves OT GO 1               Aruna Groves OT  2/1/2022

## 2022-02-01 NOTE — THERAPY EVALUATION
Acute Care - Physical Therapy Initial Evaluation   Jimmie     Patient Name: Tres Jackson  : 1940  MRN: [id number]  Today's Date: 2022      Visit Dx:     ICD-10-CM ICD-9-CM   1. Acute on chronic respiratory failure with hypoxia (HCC)  J96.21 518.84     799.02   2. Acute diastolic CHF (congestive heart failure) (HCC)  I50.31 428.31     428.0   3. Pleural effusion on right  J90 511.9   4. Acute pulmonary edema (HCC)  J81.0 518.4   5. Chronic renal impairment, unspecified CKD stage  N18.9 585.9   6. Atrial fibrillation, unspecified type (HCC)  I48.91 427.31   7. Pneumonia of right lower lobe due to infectious organism  J18.9 486   8. Difficulty walking  R26.2 719.7     Patient Active Problem List   Diagnosis   • Malignant neoplasm of ear, nose, and throat   • Tinea unguium   • Essential hypertension   • Mixed hyperlipidemia   • IFG (impaired fasting glucose)   • Hypothyroidism due to acquired atrophy of thyroid   • Stage 3b chronic kidney disease (HCC)   • Vitamin B12 deficiency   • Other fatigue   • Persistent atrial fibrillation (HCC)   • Acute diastolic CHF (congestive heart failure) (HCC)   • Idiopathic chronic gout of foot without tophus   • Atherosclerotic 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 pectoris   • Pulmonary hypertension (HCC)   • Nausea   • Acute on chronic respiratory failure with hypoxia (HCC)     Past Medical History:   Diagnosis Date   • Atherosclerotic 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 pectoris    • Atrophy of thyroid (acquired)    • Cancer (HCC)     HTM Cancer Dx    • Chronic fatigue, unspecified    • Chronic kidney disease, stage III (moderate) (HCC)    • Dysuria    • Essential (primary) hypertension    • Heart disease    • Hx of radiation therapy     XRT   • Hypothyroidism, unspecified    • Idiopathic gout, unspecified site    • Impaired fasting glucose    • Mixed hyperlipidemia    • Pure hypercholesterolemia    • Type 2 diabetes  mellitus with diabetic chronic kidney disease (HCC)      Past Surgical History:   Procedure Laterality Date   • LARYNGECTOMY     • SKIN CANCER EXCISION      ON NOSE, BILATERAL EAR - left ear 3/20   • TUMOR EXCISION      FROM CHEST     PT Assessment (last 12 hours)     PT Evaluation and Treatment     Row Name 02/01/22 1400          Physical Therapy Time and Intention    Subjective Information no complaints  -DP     Document Type evaluation  -DP     Mode of Treatment individual therapy; physical therapy  -DP     Patient Effort good  -DP     Row Name 02/01/22 1400          General Information    Patient Profile Reviewed yes  -DP     Patient Observations alert; cooperative; agree to therapy  -DP     General Observations of Patient Patient has a trach since 2008.  -DP     Prior Level of Function independent:; gait; transfer; bed mobility; ADL's  -DP     Existing Precautions/Restrictions fall  -DP     Row Name 02/01/22 1400          Living Environment    Current Living Arrangements home/apartment/condo  -DP     Home Accessibility stairs to enter home  -DP     Lives With spouse  -DP     Row Name 02/01/22 1400          Home Main Entrance    Number of Stairs, Main Entrance nine  -DP     Row Name 02/01/22 1400          Range of Motion (ROM)    Range of Motion bilateral lower extremities; ROM is WFL  -DP     Row Name 02/01/22 1400          Strength (Manual Muscle Testing)    Strength (Manual Muscle Testing) bilateral lower extremities; strength is WFL  -DP     Row Name 02/01/22 1400          Bed Mobility    Bed Mobility supine-sit-supine  -DP     Supine-Sit-Supine [place] (Bed Mobility) modified independence  -DP     Row Name 02/01/22 1400          Transfers    Transfers sit-stand transfer  -DP     Sit-Stand [place] (Transfers) modified independence  -DP     Row Name 02/01/22 1400          Gait/Stairs (Locomotion)    Gait/Stairs Locomotion gait/ambulation assistive device  -DP     [place] Level (Gait)  supervision  -DP     Assistive Device (Gait) other (see comments)  none  -DP     Distance in Feet (Gait) 60  -DP     Row Name 02/01/22 1400          Balance    Balance Assessment standing dynamic balance  -DP     Dynamic Standing Balance WFL  -DP     Row Name 02/01/22 1400          Plan of Care Review    Plan of Care Reviewed With patient  -DP     Outcome Summary Patient presents no deficits with strength, transfers, and functional mobility.  He does not need inpatient PT services at this point in time.  -DP     Row Name 02/01/22 1400          PT Evaluation Complexity    History, PT Evaluation Complexity no personal factors and/or comorbidities  -DP     Examination of Body Systems (PT Eval Complexity) total of 4 or more elements  -DP     Clinical Presentation (PT Evaluation Complexity) stable  -DP     Clinical Decision Making (PT Evaluation Complexity) low complexity  -DP     Overall Complexity (PT Evaluation Complexity) low complexity  -DP           User Key  (r) = Recorded By, (t) = Taken By, (c) = Cosigned By    Initials Name Provider Type    Zion Andersen, PT Physical Therapist                  PT Recommendation and Plan  Anticipated Discharge Disposition (PT): home with assist, home with home health  Therapy Frequency (PT): evaluation only  Plan of Care Reviewed With: patient  Outcome Summary: Patient presents no deficits with strength, transfers, and functional mobility.  He does not need inpatient PT services at this point in time.   Outcome Measures     Row Name 02/01/22 1400             How much help from another person do you currently need...    Turning from your back to your side while in flat bed without using bedrails? 4  -DP      Moving from lying on back to sitting on the side of a flat bed without bedrails? 4  -DP      Moving to and from a bed to a chair (including a wheelchair)? 4  -DP      Standing up from a chair using your arms (e.g., wheelchair, bedside chair)? 4  -DP      Climbing 3-5  steps with a railing? 3  -DP      To walk in hospital room? 3  -DP      AM-PAC 6 Clicks Score (PT) 22  -DP              Functional Assessment    Outcome Measure Options AM-PAC 6 Clicks Basic Mobility (PT)  -DP            User Key  (r) = Recorded By, (t) = Taken By, (c) = Cosigned By    Initials Name Provider Type    Zion Andersen, PT Physical Therapist                 Time Calculation:    PT Charges     Row Name 02/01/22 1420             Time Calculation    PT Received On 02/01/22  -DP              Untimed Charges    PT Eval/Re-eval Minutes 40  -DP              Total Minutes    Untimed Charges Total Minutes 40  -DP       Total Minutes 40  -DP            User Key  (r) = Recorded By, (t) = Taken By, (c) = Cosigned By    Initials Name Provider Type    Zion Andersen, PT Physical Therapist              Therapy Charges for Today     Code Description Service Date Service Provider Modifiers Qty    07248490513 HC PT EVAL LOW COMPLEXITY 3 2/1/2022 Zion Weston, PT GP 1          PT G-Codes  Outcome Measure Options: AM-PAC 6 Clicks Basic Mobility (PT)  AM-PAC 6 Clicks Score (PT): 22    Zion Weston, PT  2/1/2022

## 2022-02-01 NOTE — PLAN OF CARE
Goal Outcome Evaluation:  Plan of Care Reviewed With: patient, spouse        Progress: no change  Outcome Summary: Patient presents with limitations in self-care and endurance.  would benefit from continued skilled occupational therapy services to maximize ADL performance and return home safely and independently.

## 2022-02-01 NOTE — ED PROVIDER NOTES
Time: 10:15 PM EST  Arrived by: Private car  Chief Complaint: Weakness  History provided by: Patient and wife  History is limited by: N/A     History of Present Illness:    Tres Jackson is a 81 y.o. male who presents to the emergency department today with complaints of moderate and constant generalized weakness. The patient's wife is at bedside. She states that the patient has a history of throat cancer and that he has had a trach since 2008. She notes that he is currently in remission.    The patient's wife reports that she took the patient to see Dr. Pedroza today because he has appeared fatigued, generally weak, and nauseous. He has also had significant loss of appetite. The patient is unsure why he has felt so weak recently, but states that he is still able to ambulate. He does note two prior falls this month due to his weakness; however, they were not consecutive falls.    The patient's wife adds that he has been sleeping more than baseline over the past few days. The patient denies any cough, fever, chest pain, or abdominal pain. He does not feel that he has had more secretions from his trach. Right now, the patient denies any shortness of breath. He has had new swelling to the legs and face per his wife.    The patient has a medical history of hypertension, coronary artery disease, type II diabetes mellitus, chronic kidney disease, skin cancer, and hypercholesteremia. He is a former smoker and does drink alcohol, but denies drug use. There are no other acute complaints at this time.      History provided by:  Patient and spouse   used: No        Similar Symptoms Previously: No.  Recently seen: Patient was seen by Dr. Pedroza today.      Patient Care Team  Primary Care Provider: Erick Pedroza MD    Past Medical History:     No Known Allergies  Past Medical History:   Diagnosis Date   • Atherosclerotic 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 pectoris    • Atrophy of  thyroid (acquired)    • Cancer (HCC)     HTM Cancer Dx 2007   • Chronic fatigue, unspecified    • Chronic kidney disease, stage III (moderate) (HCC)    • Dysuria    • Essential (primary) hypertension    • Heart disease    • Hx of radiation therapy     XRT   • Hypothyroidism, unspecified    • Idiopathic gout, unspecified site    • Impaired fasting glucose    • Mixed hyperlipidemia    • Pure hypercholesterolemia    • Type 2 diabetes mellitus with diabetic chronic kidney disease (HCC)      Past Surgical History:   Procedure Laterality Date   • LARYNGECTOMY     • SKIN CANCER EXCISION      ON NOSE, BILATERAL EAR - left ear 3/20   • TUMOR EXCISION      FROM CHEST     No family history on file.    Home Medications:  Prior to Admission medications    Medication Sig Start Date End Date Taking? Authorizing Provider   allopurinol (ZYLOPRIM) 100 MG tablet TAKE 1 TABLET DAILY  Patient taking differently: Take 100 mg by mouth Daily. 11/29/21   Erick Pedroza MD   furosemide (Lasix) 40 MG tablet Take 1 tablet by mouth Daily. 11/3/21   Erick Pedroza MD   lisinopril (PRINIVIL,ZESTRIL) 40 MG tablet TAKE 1 TABLET DAILY  Patient taking differently: Take 40 mg by mouth Daily. 1/10/22   Erick Pedroza MD   potassium chloride 10 MEQ CR tablet Take 1 tablet by mouth Daily. 9/3/21   Erick Pedroza MD   pravastatin (PRAVACHOL) 20 MG tablet Take 20 mg by mouth Daily. 8/19/21   Timur Mitchell MD   spironolactone (Aldactone) 25 MG tablet Take 1 tablet by mouth Daily. 11/3/21   Erick Pedroza MD   Synthroid 100 MCG tablet Take 100 mcg by mouth Daily. 6/21/21   Timur Mitchell MD   apixaban (ELIQUIS) 5 MG tablet tablet Take 1 tablet by mouth Every 12 (Twelve) Hours. 9/3/21 1/31/22  Erick Pedroza MD   atenolol (TENORMIN) 50 MG tablet Take 1 tablet by mouth Daily. 10/20/21 1/31/22  Erick Pedroza MD        Social History:   Social History     Tobacco Use   • Smoking status: Former Smoker   • Smokeless tobacco: Never Used   Vaping Use  "  • Vaping Use: Never used   Substance Use Topics   • Alcohol use: Yes   • Drug use: Never         Review of Systems:  Review of Systems   Constitutional: Positive for appetite change and fatigue. Negative for chills and fever.   HENT: Negative for nosebleeds.         Swelling to the face. Patient has a trach. He does not feel that he has had increased secretions from his trach.   Eyes: Negative for redness.   Respiratory: Negative for cough and shortness of breath.    Cardiovascular: Positive for leg swelling (bilateral). Negative for chest pain.   Gastrointestinal: Positive for nausea. Negative for abdominal pain, diarrhea and vomiting.   Genitourinary: Negative for dysuria and frequency.   Musculoskeletal: Negative for back pain and neck pain.   Skin: Negative for rash.   Neurological: Positive for weakness (generalized). Negative for seizures.        Physical Exam:  BP (!) 147/110   Pulse 99   Temp 98.1 °F (36.7 °C) (Oral)   Resp 20   Ht 167.6 cm (66\")   Wt 75.6 kg (166 lb 10.7 oz)   SpO2 95%   BMI 26.90 kg/m²     Physical Exam  Vitals and nursing note reviewed.   Constitutional:       General: He is not in acute distress.     Appearance: Normal appearance.   HENT:      Head: Normocephalic and atraumatic.      Comments: Patient has a trach in the lower anterior throat. No obvious purulent secretions.     Nose: Nose normal.      Mouth/Throat:      Mouth: Mucous membranes are dry.      Pharynx: Oropharynx is clear.   Eyes:      General: No scleral icterus.     Conjunctiva/sclera: Conjunctivae normal.   Cardiovascular:      Rate and Rhythm: Normal rate. Rhythm irregular.      Heart sounds: Normal heart sounds. No murmur heard.       Comments: Distal pulses are +1.  Pulmonary:      Effort: No accessory muscle usage, respiratory distress or retractions.      Breath sounds: Examination of the right-lower field reveals rhonchi. Decreased breath sounds (mildly decreased on the left side; some also in the right " base) and rhonchi present. No wheezing or rales.      Comments: Patient's room air pulse ox is 80%.  Chest:      Chest wall: No tenderness.   Abdominal:      Palpations: Abdomen is soft.      Tenderness: There is no abdominal tenderness. There is no guarding or rebound.      Comments: No rigidity.   Musculoskeletal:         General: No tenderness. Normal range of motion.      Cervical back: Normal range of motion and neck supple.      Right lower leg: Edema present.      Left lower leg: Edema present.      Comments: Patient has muscle atrophy in the legs. He has trace edema in the legs, ankles, and feet.   Skin:     General: Skin is warm and dry.   Neurological:      Mental Status: He is alert. Mental status is at baseline.      Sensory: Sensation is intact.      Motor: Motor function is intact.   Psychiatric:         Mood and Affect: Mood normal.         Behavior: Behavior normal.                Medications in the Emergency Department:  Medications   sodium chloride 0.9 % flush 10 mL (has no administration in time range)   cefTRIAXone (ROCEPHIN) IVPB 1 g (1 g Intravenous New Bag 1/31/22 2259)   AZITHROMYCIN 500 MG/250 ML 0.9% NS IVPB (vial-mate) (has no administration in time range)   furosemide (LASIX) injection 80 mg (80 mg Intravenous Given 1/31/22 2256)        Labs  Lab Results (last 24 hours)     Procedure Component Value Units Date/Time    CBC & Differential [[id number]]  (Abnormal) Collected: 01/31/22 1819    Specimen: Blood Updated: 01/31/22 1849    Narrative:      The following orders were created for panel order CBC & Differential.  Procedure                               Abnormality         Status                     ---------                               -----------         ------                     CBC Auto Differential[[id number]]        Abnormal            Final result                 Please view results for these tests on the individual orders.    Comprehensive Metabolic Panel [[id number]]   (Abnormal) Collected: 01/31/22 1819    Specimen: Blood Updated: 01/31/22 1913     Glucose 152 mg/dL      BUN 29 mg/dL      Creatinine 1.41 mg/dL      Sodium 141 mmol/L      Potassium 4.0 mmol/L      Chloride 96 mmol/L      CO2 32.3 mmol/L      Calcium 10.3 mg/dL      Total Protein 7.8 g/dL      Albumin 4.40 g/dL      ALT (SGPT) 10 U/L      AST (SGOT) 37 U/L      Alkaline Phosphatase 135 U/L      Total Bilirubin 2.1 mg/dL      eGFR Non African Amer 48 mL/min/1.73      Globulin 3.4 gm/dL      A/G Ratio 1.3 g/dL      BUN/Creatinine Ratio 20.6     Anion Gap 12.7 mmol/L     Narrative:      GFR Normal >60  Chronic Kidney Disease <60  Kidney Failure <15      BNP [[id number]]  (Abnormal) Collected: 01/31/22 1819    Specimen: Blood Updated: 01/31/22 1907     proBNP 16,990.0 pg/mL     Narrative:      Among patients with dyspnea, NT-proBNP is highly sensitive for the detection of acute congestive heart failure. In addition NT-proBNP of <300 pg/ml effectively rules out acute congestive heart failure with 99% negative predictive value.    Results may be falsely decreased if patient taking Biotin.      Troponin [[id number]]  (Normal) Collected: 01/31/22 1819    Specimen: Blood Updated: 01/31/22 1911     Troponin T 0.021 ng/mL     Narrative:      Troponin T Reference Range:  <= 0.03 ng/mL-   Negative for AMI  >0.03 ng/mL-     Abnormal for myocardial necrosis.  Clinicians would have to utilize clinical acumen, EKG, Troponin and serial changes to determine if it is an Acute Myocardial Infarction or myocardial injury due to an underlying chronic condition.       Results may be falsely decreased if patient taking Biotin.      CBC Auto Differential [[id number]]  (Abnormal) Collected: 01/31/22 1819    Specimen: Blood Updated: 01/31/22 1849     WBC 8.21 10*3/mm3      RBC 5.10 10*6/mm3      Hemoglobin 16.3 g/dL      Hematocrit 49.9 %      MCV 97.8 fL      MCH 32.0 pg      MCHC 32.7 g/dL      RDW 16.8 %      RDW-SD 59.6 fl      MPV 9.9 fL       Platelets 142 10*3/mm3      Neutrophil % 64.1 %      Lymphocyte % 20.3 %      Monocyte % 7.6 %      Eosinophil % 6.1 %      Basophil % 1.5 %      Immature Grans % 0.4 %      Neutrophils, Absolute 5.27 10*3/mm3      Lymphocytes, Absolute 1.67 10*3/mm3      Monocytes, Absolute 0.62 10*3/mm3      Eosinophils, Absolute 0.50 10*3/mm3      Basophils, Absolute 0.12 10*3/mm3      Immature Grans, Absolute 0.03 10*3/mm3      nRBC 0.0 /100 WBC            Imaging:  XR Chest 1 View    Result Date: 1/31/2022  PROCEDURE: XR CHEST 1 VW  COMPARISON: [facility], CR, CHEST PA/AP & LAT 2V, 4/17/2020, 16:05.  INDICATIONS: SHORT OF BREATH  FINDINGS:   The cardiac silhouette is enlarged.  There is a large amount of consolidation/effusion in the right hemithorax.  No evidence of pneumothorax.  IMPRESSION: Enlarged cardiac silhouette.  Large amount of effusion/consolidation in the right hemithorax.   JESSICA KAUFFMAN MD       Electronically Signed and Approved By: JESSICA KAUFFMAN MD on 1/31/2022 at 20:30               Procedures:  Procedures    Progress  ED Course as of 01/31/22 2303   Mon Jan 31, 2022   2242 Discussed case in detail with Dr. Pedroza. [RF]   2908 EKG:    Rhythm: 86  Rate: Atrial fibrillation  Intervals: Slightly prolonged QT interval  T-wave: Slight baseline artifact, T wave flattening is nonspecific and II, III, aVF, aVL  ST Segment: Nonspecific ST depression V4, V5, no pathological ST elevation or ST elevation    EKG Comparison: Both the rhythm and ST morphology is changed from EKG performed April 17, 2020    Interpreted by me   [SD]      ED Course User Index  [RF] Luzma Thomas  [SD] Juan Manuel Plata DO                            Medical Decision Making:  MDM  Number of Diagnoses or Management Options  Acute on chronic respiratory failure with hypoxia (HCC)  Acute pulmonary edema (HCC)  Atrial fibrillation, unspecified type (HCC)  Chronic renal impairment, unspecified CKD stage  Pleural effusion on  right  Pneumonia of right lower lobe due to infectious organism  Diagnosis management comments:     The patient presented today in no respiratory distress.  However the patient's room air pulse ox was 79 to 80%.  The patient was placed on a trach collar at 40%.  The patient's pulse ox immediately improved to 93%.  The patient was convinced that this was wrong due to the fact that with a pulse ox on the wrong finger.  The oxygen was removed.  And once again the patient's pulse ox dropped to 81%.  A trach collar was placed back on the patient and again the patient's pulse ox improved to 93%.  The patient was in no respiratory distress.  The patient's chest x-ray demonstrated pulmonary edema as well as a large right-sided pleural effusion with consolidation.  The patient was afebrile and had a normal white blood cell count.  It is possible the patient had pneumonia down in the right base.  Blood cultures were obtained the patient was given broad-spectrum antibiotics.  However, the patient had a edema in his legs with a significant elevated BNP and other evidence for pulmonary edema on his chest x-ray.  In addition, in discussion with Dr. Pedroza and it was discovered the patient has taken himself off her forgot to refill his diuretic.  It is suspected that the effusion and pulmonary edema is most likely due to acute on chronic congestive heart failure.  The patient was given 80 mg of Lasix in the emergency room.  The patient was admitted to the hospital due to the acute on chronic respiratory failure with hypoxia.  The consolidation and effusion in the right lower lobe will be further evaluated with CT examination and further decisions will be made according to the findings on the CT.  This was discussed with Dr. Pedroza.  Dr. Pedroza is agreeable for admission       Amount and/or Complexity of Data Reviewed  Clinical lab tests: reviewed  Tests in the radiology section of CPT®: reviewed  Tests in the medicine section of  CPT®: reviewed  Decide to obtain previous medical records or to obtain history from someone other than the patient: yes  Discuss the patient with other providers: yes (I discussed the case with Dr. Velasquez, the patient's primary care physician.  He will admit the patient to the hospital and further evaluation of the right consolidation and pleural effusion will be based upon the findings of the CT scan that will be ordered upon admission)                 Final diagnoses:   Acute on chronic respiratory failure with hypoxia (HCC)   Pleural effusion on right   Acute pulmonary edema (HCC)   Chronic renal impairment, unspecified CKD stage   Atrial fibrillation, unspecified type (HCC)   Pneumonia of right lower lobe due to infectious organism        Disposition:  ED Disposition     ED Disposition Condition Comment    Decision to Admit  Level of Care: Telemetry [5]   Diagnosis: Acute on chronic respiratory failure with hypoxia (HCC) [[id number]]   Admitting Physician: ALFRED VELASQUEZ [[id number]]   Attending Physician: ALFRED VELASQUEZ [[id number]]   Isolate for COVID?: Yes [1]   Certification: I Certify That Inpatient Hospital Services Are Medically Necessary For Greater Than 2 Midnights            Part of this note may be an electronic transcription/translation of spoken language to printed text using the Dragon Dictation System.     Documentation assistance provided by Luzma Thomas acting as scribe for Juan Manuel Plata DO. Information recorded by the scribe was done at my direction and has been verified and validated by me.        Luzma Thomas  01/31/22 2223       Luzma Thomas  01/31/22 2224       Luzma Thomas  01/31/22 2225       Juan Manuel Plata DO  02/06/22 020

## 2022-02-01 NOTE — PLAN OF CARE
Goal Outcome Evaluation:  Plan of Care Reviewed With: patient           Outcome Summary: Patient presents no deficits with strength, transfers, and functional mobility.  He does not need inpatient PT services at this point in time.

## 2022-02-02 PROBLEM — E43 SEVERE MALNUTRITION (HCC): Status: ACTIVE | Noted: 2022-01-01

## 2022-02-02 NOTE — PAYOR COMM NOTE
"Albert Jackson (81 y.o. Male)             Date of Birth Social Security Number Address Home Phone MRN    1940  [address] [place] [address] [phone] [id number]    [employer] Marital Status             Orthodox        Admission Date Admission Type Admitting Provider Attending Provider Department, Room/Bed    1/31/22 Emergency Erick Pedroza MD Eklund, Brian, MD [facility] PROGRESSIVE CARE UNIT 2, 258/1    Discharge Date Discharge Disposition Discharge Destination                         Attending Provider: Erick Pedroza MD    Allergies: No Known Allergies    Isolation: None   Infection: None   Code Status: CPR   Advance Care Planning Activity    Ht: 167.6 cm (66\")   Wt: 76.1 kg (167 lb 12.3 oz)    Admission Cmt: None   Principal Problem: None                Active Insurance as of 1/31/2022     Primary Coverage     Payor Plan Insurance Group Employer/Plan Group    HUMANA MEDICARE REPLACEMENT HUMANA MEDICARE REPLACEMENT [id number]     Payor Plan Address Payor Plan Phone Number Payor Plan Fax Number Effective Dates    [address] [address] [phone]  1/1/2018 - None Entered    [facility] [phone]       Subscriber Name Subscriber Birth Date Member ID       ALBERT JACKSON 1940 [id number]                 Emergency Contacts      (Rel.) Home Phone Work Phone Mobile Phone    briseyda jackson (Spouse) -- -- [phone]        AUTH# [id number] PENDING    CONTACT   JON S UTILIZATION REVIEW    [facility]  [address] [place] [address]  TAX ID [id number]  NP  [id number]       [phone]   [phone]          Pneumonia RRG - Inpatient Care by Alejandra Pappas, RN         Met (Selected): Reviewed on 2/1/2022 by Alejandra Pappas RN       Created Using Review Status Review Entered   Tele[employer]® Completed 2/1/2022 09:41       Criteria Set Name - Subset   Pneumonia RRG - Inpatient Care       Selected?   Yes - Inpatient Care is selected for " the Pneumonia RRG criteria set.      Criteria Review      Inpatient Care    Most Recent : Alejandra Pappas Most Recent Date: 2/1/2022 09:41:12 EST    (X) Admission is indicated for  1 or more  of the following  [D] [E]:       (X) Hypoxemia       2/1/2022 09:41:12 EST by Alejandra Pappas         O 2 sat 88%  patient has trach       (X) Hemodynamic instability       2/1/2022 09:41:12 EST by Alejandra Pappas         HR- 131  RR- 22       (X) Complicated pleural effusions (eg, empyema, exudative, loculated)    Notes:    2/1/2022 09:41:12 EST by Alejandra Pappas    Subject: Admission    Enlarged cardiac silhouette.  Large amount of effusion/consolidation in the right hemithorax.        Pneumonia of right lower lobe due to infectious organism    Possible       2/1/2022 09:41:12 EST by Alejandra Pappas    Subject: Admission    rocephin iv, azithromycin iv, lasix 80 mg iv- given in ED          Emergency Department Notes    No notes of this type exist for this encounter.            Erick Pedroza MD   Physician   Specialty:  Internal Medicine   Progress Notes       Signed   Encounter Date:  1/31/2022         Related encounter: Office Visit from 1/31/2022 in [facility] INTERNAL MEDICINE with Erick Pedroza MD           Signed        Expand All Collapse All        Show:Clear all  [x]Manual[x]Template[x]Copied    Added by:  [x]Erick Pedroza MD      []Hover for details    Chief Complaint  Nausea (Pt gets nauseated and he has no appetite. He did finally have the over night. I didn't get referred to a cardio due to that message fell through the cracks. He has been falling as well and he is sleeping all the time.  He has been out of both water pills for two weeks. )        Subjective []Expand by Default             Tres Jackson presents to [facility] INTERNAL MEDICINE      History of present illness:  Patient is a 80-year-old male with underlying hypertension, hyperlipidemia, and resultant  "chronic kidney disease stage 3b, and recent diagnosis of congestive heart failure, who was seen 11/21 for 1 month follow-up of CHF, and who is coming in 1/22 for urgent issues as noted in the CC.       Review of Systems   Constitutional: Negative for appetite change, fatigue and fever.   HENT: Negative for congestion and ear pain.    Eyes: Negative for blurred vision.   Respiratory: Negative for cough, chest tightness, shortness of breath and wheezing.    Cardiovascular: Negative for chest pain, palpitations and leg swelling.   Gastrointestinal: Negative for abdominal pain.   Genitourinary: Negative for difficulty urinating, dysuria and hematuria.   Musculoskeletal: Negative for arthralgias and gait problem.   Skin: Negative for skin lesions.   Neurological: Negative for syncope, memory problem and confusion.   Psychiatric/Behavioral: Negative for self-injury and depressed mood.               Objective      Vital Signs:   /90   Pulse 90   Temp 98.2 °F (36.8 °C)   Ht 165.1 cm (65\")   Wt 77 kg (169 lb 12.8 oz)   SpO2 (!) 85%   BMI 28.26 kg/m²              Physical Exam  Vitals and nursing note reviewed.   Constitutional:       General: He is not in acute distress.     Appearance: Normal appearance. He is not toxic-appearing.   HENT:      Head: Atraumatic.      Right Ear: External ear normal.      Left Ear: External ear normal.      Nose: Nose normal.      Mouth/Throat:      Mouth: Mucous membranes are moist.   Eyes:      General:         Right eye: No discharge.         Left eye: No discharge.      Extraocular Movements: Extraocular movements intact.      Pupils: Pupils are equal, round, and reactive to light.   Neck:      Comments: Chronic trach.  Cardiovascular:      Rate and Rhythm: Normal rate. Rhythm irregular.      Pulses: Normal pulses.      Heart sounds: Normal heart sounds. No murmur heard.  No gallop.       Comments: Irregularly irregular, no S3.  Pulmonary:      Effort: Pulmonary effort is " normal. No respiratory distress.      Breath sounds: No wheezing, rhonchi or rales.      Comments: Decreased breath sounds, no rales = ditto 11/21 OV.  Abdominal:      General: There is no distension.      Palpations: Abdomen is soft. There is no mass.      Tenderness: There is no abdominal tenderness. There is no guarding.   Musculoskeletal:         General: No swelling or tenderness.      Cervical back: No tenderness.      Right lower leg: Edema present.      Left lower leg: Edema present.      Comments: 2-3+ bilateral lower extremity edema up to the knees... Edema improved 1+ as of 10/21 off visit---> this level edema is stable as of 11/21 OV.   Skin:     General: Skin is warm and dry.      Findings: No rash.   Neurological:      General: No focal deficit present.      Mental Status: He is alert and oriented to person, place, and time. Mental status is at baseline.      Motor: No weakness.      Gait: Gait normal.   Psychiatric:         Mood and Affect: Mood normal.         Thought Content: Thought content normal.                  Result Review    :   The following data was reviewed by: Erick Pedroza MD on 09/03/2021:               Assessment      Assessment and Plan    Diagnoses and all orders for this visit:     1. Essential hypertension (Primary)  Overview:  Blood pressure is up the past 2 office visits, so we need to increase his medications.  I was going to titrate his Tenormin, but on exam he is noted to be in acute heart failure likely secondary to new onset A. fib.  We will see where his blood pressure goes with diuresis, but probably will end up switching him to Coreg and titrating this.  Just one make 1 medication change at a time with him...Will titrate Tenormin for heart rate and blood pressure control as of 10/21 office---> being switched to Coreg as of 1/22 office visit.     Assessment & Plan:  Blood pressure remains suboptimal as of 1/22.  His wife is here today, so we can be more aggressive with  medication adjustments.  She is going to keep a closer eye on these for us now.  Changing to carvedilol now.           2. Stage 3b chronic kidney disease (HCC)  Overview:  34% = still looks dry and I d/w him in detail need to see Renal if no better on RTO; I d/w stop nsaids please...GFR is 49 as of his September 2021 appointment.  This is a nice improvement, he does not need to see the nephrologist now, I will continue to monitor this...GFR was down a bit to 30 on those labs after 10/21 OV.  Corresponding BUN/creatinine were 29 and 2.0.  Need labs soon as of 11/21 OV.        Assessment & Plan:  GFR was 35 per his 11/21 labs.  This was fairly stable, but obviously needs repeat labs as of 1/22 urgent visit.        3. Persistent atrial fibrillation (HCC)  Overview:  This appears to be a new issue = yes, EKG done today 9/3/2021 to evaluate the patient's irregular heartbeat noted on exam confirms this.  The EKG reveals new onset A. 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  Additionally there is an age-indeterminate high lateral MI with new changes noted in leads I and aVL compared with the prior.  There are no active acute ischemic changes identified.  Plan per orders with anticoagulation, diuresis, and close follow-up...Patient's echo with no obvious wall motion abnormalities, so we will address better rate control with his Tenormin and defer changes to Coreg at this time at least.  Continue with Eliquis for anticoagulation---> continue same meds as of 11/21 OV.  Patient is rate controlled this OV.     Assessment & Plan:  Patient remains rate controlled as of 1/22 office visit.  We are switching to carvedilol now given his associated CHF.  Of concerning note, he is not currently on Eliquis, so I will resume this when he is hospitalized later today.        4. Acute diastolic CHF (congestive heart failure) (HCC)  Overview:  This is a new issue as of his September 2021  appointment.  Hopefully it simply related to the apparent atrial fibrillation.  We will need to start diuretics, get an echo, and follow him up very shortly...Echo 10/21 with normal EF, no concerning valve changes, he does have significant pulmonary hypertension.  Continue with current dose of Lasix, get labs as noted, should be okay to titrate his Tenormin...BNP was 9500 following his 10/21 OV.  BUN/creatinine had trended up some, so need to see results soon as of 11/21 before can make further medication recommendations.     Assessment & Plan:  Patient's weight is down 5 pounds as of his 1/22 urgent visit, however it appears this is related to decreased p.o. intake more so than improved volume control.  This is based on the fact that he is edematous, and has been out of his diuretics for a couple of weeks.  Discussed with patient and his wife in detail today that he needs to be hospitalized to optimize his volume status, and to rapidly correct his hypoxemia which is progressing.  I tried to place directed admit orders, but the hospital has no beds, so the patient is being referred to the ER.        5. Nausea  Assessment & Plan:  Difficult to say what this is related to.  Patient is not having any blood in his stools, no nausea per se, more anorexia.  Likely his electrolytes etc. are way out of whack, he needs comprehensive laboratory studies as of 1/22 office visit..        6. Pulmonary hypertension (HCC)  Overview:  This was noted on the echo.  We are trying to get overnight O2 sats.  Still do not have this as of 11/21.  We will investigate.     Assessment & Plan:  Patient with significant hypoxia, his O2 sats were 83% on average while asleep, and actually not much higher when awake.  He is presently 85% on room air.  Discussed with the patient and his wife that he needs oxygen 24/7 now, will get this initiated in the hospital as of his 1/22 urgent visit.        --  --  OLDER NOTES:  (D/W HIM ON RTO GERD/STOMACH  ISSUES THAT WIFE D/W ME ON MONDAY---> I d/w him 3/21 and he denies it; will repeat CBC on RTO)  --I have called pharmacies in town and they have no record of Prevnar, I have called pt's wife and she feels that he has not received it yet. MB---> I will address this after Covid shots done.     VISIT 6/21:  ANNUAL MEDICARE WELLNESS PHYSICAL 8/20 OV=forms reviewed in room with pt; no new issues raised.  --  HTN well controlled...but f/u off HCTZ...as above; will try increasing ACEI, but may need another agent on RTO...improved...up and down at home...pt here a year later and needs increase meds 8/20...some better 11/20 despite intol to Norvasc 5 mg=edema; will use another later if BP trends higher... stable 3/21---> is up 6/21, so increase meds if same on RTO.  CKD3 stable...35/1.4 (59%)...60/2.0, not drinking as well, some loose stools, no pains/no hematuria/etc, so will just lose the HCTZ for now...25/1.3...43/1.7 is likely related to being dry, so ok to stay on low dose nsaid=1/2 of 600 mg, but will check sooner...26/1.3 is back to baseline...54%...49%...53%...42% is after not being seen past year as of 8/20 OV...50% is nice to see 11/20...43% is ok for now 3/21---> 34% = still looks dry and I d/w him in detail need to see Renal if no better on RTO; I d/w stop nsaids please.  --  DM well controlled and he's comfortable being on actos...remains well controlled...6.1...5.7 is ok since no lows, no sweats/etc = ditto...same=5.8 with no lows as of 8/20 OV...5.5 and will stop actos now as of 11/20...5.6 off it is great---> 6.1 is fine 6/21.  THYROID stable on 75 qd...stable 9/16 = 2.6...7.2, so increase to 100...1.9 is great...fine 9/18...wnl 4/19---> fine 6/21.  --  CAD with no ischemia...appears stable, but does have some fatigue c/o at 3/18 OV, so will consider SPECT if persists---> no CP/SOB 8/20.  LIPIDS at goal=LDL 78---> 97 is fine 6/21.  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  --  GOUT with no recent flare...6.5...6.4---> 6 in 11/20.  VIT B12 DEF=9/18 = 1K OTC to start---> 1500.  --  --  PSA 0.4 on 4/17/17.  COLON not rec given age and no sx's.  Havrix-Hep A 8/18, Shingles 2019; COVID x1 as of 6/21 OV=Pfizer.  Prevnar rec 9/17; rec Pneumovax if he got prevnar as of 1/19 OV, but he didn't, so go get it now...he can't say 4/19 = we will call; d/w COVID shot needed 3/21.  Flu shot for 2021 season given at his 10/21 office visit.  ([place] scared about being on water alone=trach; Jade/Jamison both here in town).     Follow Up   Return in about 2 weeks (around 2/14/2022).  Patient was given instructions and counseling regarding his condition or for health maintenance advice. Please see specific information pulled into the AVS if appropriate.                                 Sunil Nash RNA   Registered Nurse      Plan of Care   Signed   Date of Service:  02/01/22 0533   Creation Time:  02/01/22 0533              Signed              Show:Clear all  [x]Manual[x]Template[]Copied    Added by:  [x]Sunil Nash RNA      []Ericka for details    Goal Outcome Evaluation:  Plan of Care Reviewed With: patient  Progress: no change   On 10 L Trach collar. BP been trending high.                   Gabehart, Cassidy, RN   Registered Nurse      Plan of Care   Signed   Date of Service:  02/01/22 1757   Creation Time:  02/01/22 1757              Signed              Show:Clear all  [x]Manual[x]Template[]Copied    Added by:  [x]Gabehart, Cassidy, KIANA      []Ericka for details    Goal Outcome Evaluation:   Patient is alert and oriented, 9L high flow via trach, VSS, no complaints of pain. Dana GabehKIANA bonilla Navin R, MD   Physician   Pulmonology   Procedures       Signed   Date of Service:  02/02/22 1209   Creation Time:  02/02/22  1209           Pre-procedure Diagnoses   Pleural effusion [J90]     Post-procedure Diagnoses   Pleural effusion [J90]     Procedures   BEDSIDE THORACENTESIS [PFT24 (Custom)]          Signed              Show:Clear all  [x]Manual[x]Template[]Copied    Added by:  [x]Cornelio Damon MD      []Ericka for details    Thoracentesis Procedure Note     Indication: Moderate to large right sided pleural effusion     Consent: Yes, placed in chart.     Procedure: The patient was placed in the sitting position and the appropriate landmarks were identified. The skin over the puncture site in the right posterior chest wall was prepped with chlorprep and draped in sterile fashion. Local anesthesia was applied with 1% lidocaine. Thoracentesis catheter was inserted with needle guidance. Pleural fluid was clear yellow, drained 1900 cc fluid. The catheter was then withdrawn and a sterile dressing was placed over the site. A post procedure film is pending at this time.     The patient tolerated the procedure well.     Complications: None                    Cornelio Damon MD   Physician   Pulmonology   Procedures       Signed   Date of Service:  02/02/22 1207   Creation Time:  02/02/22 1207           Pre-procedure Diagnoses   Recurrent pleural effusion on right [J90]     Post-procedure Diagnoses   Recurrent pleural effusion on right [J90]     Procedures   BEDSIDE THORACENTESIS [PFT24 (Custom)]          Signed              Show:Clear all  [x]Manual[x]Template[]Copied    Added by:  [x]Cornelio Damon MD      []Ericka for details    Bedside thoracic ultrasound:     Left showed B lines, curtain sign seen.     Right side showed moderate to large effusion with anechoic space between lung and diaphragm.      Site marked for thoracentesis.     Images stored online.

## 2022-02-02 NOTE — CONSULTS
Pulmonary / Critical Care Consult Note      Patient Name: Tres Jackson  : 1940  MRN: [id number]  Primary Care Physician:  Erick Pedroza MD  Referring Physician: Erick Pedroza MD  Date of admission: 2022    Subjective   Subjective     Reason for Consult/ Chief Complaint: Large right pleural effusion, acute hypoxic respiratory failure.    HPI:  Tres aJckson is a 81 y.o. male with past medical history of laryngeal cancer s/p laryngectomy , diastolic HF, CKD 3, HTN , and DM2 presented to the ED after being seen in office by PCP for worsening dyspnea with O2 sats 85% on room air.  In the ED he was found to be hypoxic and placed on trach collar.  ProBNP was elevated at 16,990.  CT chest revealed large right pleural effusion and small left effusion.  Because of the above our services was consulted for further evaluation for possible thoracentesis.  Upon exam, he is lying on bed on 10 L trach collar with O2 sats 90%.  He feels short of breath and has orthopnea.  He states he does not normally wear oxygen at home.  He has stoma with minimal secretions.  He has a voice box he uses to speak.  He denies any fever, chills, hemoptysis, chest pain, nausea, or vomiting.  He denies any recent sick contacts.  He has had one of the covid vaccines.    Review of Systems  General:  No Fatigue, No Fever.   HEENT: No dysphagia, No Visual Changes, no rhinorrhea  Respiratory:  +  Nonproductive cough, + Dyspnea, Clear phlegm, No Pleuritic Pain, No wheezing, no hemoptysis  Cardiovascular: Denies chest pain, denies palpitations, + MAXWELL, No Chest Pressure, +  orthopnea  Gastrointestinal:  No Abdominal Pain, No Nausea, No Vomiting, No Diarrhea  Genitourinary:  No Dysuria, No Frequency, No Hesitancy  Musculoskeletal: No muscle pain or swelling  Endocrine:  No Heat Intolerance, No Cold Intolerance  Hematologic:  No Bleeding, No Bruising  Psychiatric:  No Anxiety, No Depression  Neurologic:  No Confusion, no Dysarthria,  No Headaches  Skin:  No Rash, No Open Wounds    Personal History     Past Medical History:   Diagnosis Date   • Atherosclerotic 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 pectoris    • Atrophy of thyroid (acquired)    • Cancer (HCC)     HTM Cancer Dx 2007   • Chronic fatigue, unspecified    • Chronic kidney disease, stage III (moderate) (HCC)    • Dysuria    • Essential (primary) hypertension    • Heart disease    • Hx of radiation therapy     XRT   • Hypothyroidism, unspecified    • Idiopathic gout, unspecified site    • Impaired fasting glucose    • Mixed hyperlipidemia    • Pure hypercholesterolemia    • Type 2 diabetes mellitus with diabetic chronic kidney disease (HCC)        Past Surgical History:   Procedure Laterality Date   • LARYNGECTOMY     • SKIN CANCER EXCISION      ON NOSE, BILATERAL EAR - left ear 3/20   • TUMOR EXCISION      FROM CHEST       Family History: No family history of lung disease.    Social History:  reports that he has quit smoking. He has never used smokeless tobacco. He reports current alcohol use. He reports that he does not use drugs.    Home Medications:  allopurinol, furosemide, levothyroxine, lisinopril, and pravastatin    Allergies:  No Known Allergies    Objective    Objective     Vitals:   Temp:  [97.3 °F (36.3 °C)-98.3 °F (36.8 °C)] 97.9 °F (36.6 °C)  Heart Rate:  [78-87] 78  Resp:  [18-20] 20  BP: (139-184)/() 139/98  Flow (L/min):  [10] 10    Physical Exam:  Vital Signs Reviewed   General: WDWN male, Awake and Alert, mild distress on trach collar 10 L     HEENT:  PERRL, EOMI.  OP, nares clear, no sinus tenderness, trach stoma without redness  Neck:  Supple, no JVD, no thyromegaly  Lymph: no axillary, cervical, supraclavicular lymphadenopathy noted bilaterally  Chest: good aeration, diminished on the right, dull to percussion on right, no work of breathing noted  CV: RRR, no MGR, pulses 2+, equal  Abd:  Soft, NT, ND, + BS, no HSM  EXT:  no clubbing, no  cyanosis, no edema, no joint tenderness  Neuro:  A&Ox3, CN grossly intact, no focal deficits  Skin: No rashes or lesions noted    Result Review    Result Review:  I have personally reviewed the results from the time of this admission to 2/2/2022 07:09 EST and agree with these findings:  [x]  Laboratory  [x]  Microbiology  [x]  Radiology  [x]  EKG/Telemetry   []  Cardiology/Vascular   []  Pathology  [x]  Old records  []  Other:  Most notable findings include: proBNP 16,990  WBC 8.21, lactic 1.8, Cr 1.15, K 3.6    COVID negative  Flu negative  Blood cultures no growth at 24 hours    2/1 CT chest revealed large right pleural effusion and small left effusion, bilateral atelectasis, possible pneumonia    10/2021 echo EF 56%, RVSP >55    Assessment/Plan   Assessment / Plan     Active Hospital Problems:  Active Hospital Problems    Diagnosis    • Acute on chronic respiratory failure with hypoxia (HCC)      Impression:  Large right pleural effusion  Acute hypoxic respiratory failure  Acute on chronic diastolic heart failure: proBNP 16,990  CKD stage III  Concern for community acquired pneumonia of unspecified   HTN  DM 2  Afib: non-compliant with Eliquis  Pulmonary Arterial Hypertension: 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  Discussed proceding forward with thoracentesis. Discussed the risks of the procedure with the patient including pneumothorax, hemothorax, bleeding, hypoxia and death. The patient recognizes these findings, acknowledges these findings and is agreeable to the procedure.  Obtained consent for right sided thoracentesis.  Will perform ultrasound-guided thoracentesis of right.  With drainage of 1.9 L.  Will send pleural fluid for cultures and cytology.  Will perform follow up CXR.  Continue diuresis with Lasix 60 mg IV BID.  Trend renal panel and electrolytes.  Will check procal and send sputum culture.  Continue  Azithromycin and Ceftriaxone.  Can de-escalate based off cultures.  Wean O2 to keep sats >90%.  Does not use O2 at home.  Will add bronchopulmonary hygiene.  Encourage activity.  Up to chair as tolerated.      Labs, microbiology, radiology, medications, and provider notes personally reviewed.  Discussed with primary services and bedside RN.    Thank you for this consult and allowing me to participate in the care of Mr. Jackson.    I, Daija PATEL , am scribing for Dr. Damon on 2/2/22.     Portions of this note were scribed by,  Daija PATEL, who was present on rounds with me. I personally reviewed the entirety of the documentation & made changes where appropriate. The documentation, as is signed by myself, accurately reflects my involvement in the patients care today.    Electronically signed by Cornelio Damon MD, 2/2/2022, 07:09 EST.

## 2022-02-02 NOTE — PROCEDURES
Thoracentesis Procedure Note    Indication: Moderate to large right sided pleural effusion    Consent: Yes, placed in chart.    Procedure: The patient was placed in the sitting position and the appropriate landmarks were identified. The skin over the puncture site in the right posterior chest wall was prepped with chlorprep and draped in sterile fashion. Local anesthesia was applied with 1% lidocaine. Thoracentesis catheter was inserted with needle guidance. Pleural fluid was clear yellow, drained 1900 cc fluid. The catheter was then withdrawn and a sterile dressing was placed over the site. A post procedure film is pending at this time.    The patient tolerated the procedure well.    Complications: None

## 2022-02-02 NOTE — PLAN OF CARE
Goal Outcome Evaluation:  Plan of Care Reviewed With: patient        Progress: no change  Outcome Summary: VSS. No acute changes with patient. Thoracentesis completed today; no post complications. Continuing to monitor.  Nadine Obrien RN

## 2022-02-02 NOTE — CONSULTS
"Nutrition Services    Patient Name: Tres Jackson  YOB: 1940  MRN: [id number]  Admission date: 1/31/2022      CLINICAL NUTRITION ASSESSMENT      Reason for Assessment  MST score 2+     H&P:    Past Medical History:   Diagnosis Date   • Atherosclerotic 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 pectoris    • Atrophy of thyroid (acquired)    • Cancer (HCC)     HTM Cancer Dx 2007   • Chronic fatigue, unspecified    • Chronic kidney disease, stage III (moderate) (HCC)    • Dysuria    • Essential (primary) hypertension    • Heart disease    • Hx of radiation therapy     XRT   • Hypothyroidism, unspecified    • Idiopathic gout, unspecified site    • Impaired fasting glucose    • Mixed hyperlipidemia    • Pure hypercholesterolemia    • Type 2 diabetes mellitus with diabetic chronic kidney disease (HCC)         Current Problems:   Active Hospital Problems    Diagnosis    • Acute on chronic respiratory failure with hypoxia (HCC)         Nutrition/Diet History         Narrative     Pt uses [place] valve for communication.  Patient reports weight loss and decreased p.o. intake eating 50% or less x3 months.   Patient was n.p.o. for breakfast; now status post 1.9L paracentesis. Pt is edematous bilateral to thigh area. Po intake here is 25%.  Patient cannot recall any food he would be interested in other than fresh fruit. He agrees to ensure Enlive (20g protein 350kcal) supplements.     Anthropometrics        Current Height, Weight Height: 167.6 cm (66\")  Weight: 76.1 kg (167 lb 12.3 oz)   Current BMI Body mass index is 27.08 kg/m².       Weight Hx  Wt Readings from Last 30 Encounters:   02/01/22 2316 76.1 kg (167 lb 12.3 oz)   01/31/22 1811 75.6 kg (166 lb 10.7 oz)   01/31/22 1534 77 kg (169 lb 12.8 oz)   11/22/21 1629 79 kg (174 lb 3.2 oz)   10/20/21 1429 81.6 kg (179 lb 14.4 oz)   10/20/21 1350 79.4 kg (175 lb)   09/03/21 1309 86.5 kg (190 lb 12.8 oz)   12/05/18 0000 88.1 kg (194 lb 4 oz) "            Wt Change Observation Anticipate further wt decline with diuresis/thorocentesis fluid status change     Malnutrition Severity Assessment      Patient meets criteria for : Severe Malnutrition  Malnutrition Type (last 8 hours)     Malnutrition Severity Assessment     Row Name 02/02/22 1329       Malnutrition Severity Assessment    Malnutrition Type Chronic Disease - Related Malnutrition    Row Name 02/02/22 1329       Insufficient Energy Intake     Insufficient Energy Intake Findings Severe    Insufficient Energy Intake  <75% of est. energy requirement for > or equal to 3 months    Row Name 02/02/22 1329       Muscle Loss    Loss of Muscle Mass Findings Moderate    [place] Region Moderate - slight depression    Clavicle Bone Region Moderate - some protrusion in females, visible in males    Acromion Bone Region Moderate - acromion may slightly protrude    Scapular Bone Region Moderate - mild depression, bones may show slightly    Dorsal Hand Region Moderate - slight depression    Patellar Region Moderate - patella more prominent, less muscle definition around patella    Anterior Thigh Region Moderate - mild depression on inner thigh    Posterior Calf Region Severe - thin with very little definition/firmness    Row Name 02/02/22 1329       Fat Loss    Subcutaneous Fat Loss Findings Moderate    Orbital Region  Moderate -  somewhat hollowness, slightly dark circles    Upper Arm Region Moderate - some fat tissue, not ample    Row Name 02/02/22 1329       Fluid Accumulation (Edema)    Fluid Acumulation Findings Severe    Fluid Accumulation  Severe equals 3+ or 4+ pitting edema    Row Name 02/02/22 1329       Criteria Met (Must meet criteria for severity in at least 2 of these categories: M Wasting, Fat Loss, Fluid, Secondary Signs, Wt. Status, Intake)    Patient meets criteria for  Severe Malnutrition                       Estimated/Assessed Needs       Energy Requirements    EST Needs (kcal/day) 1920kcal        Protein Requirements    EST Daily Needs (g/day) 80g        Fluid Requirements     Estimated Needs (mL/day) 1600-1920ml     Labs/Medications         Pertinent Labs Reviewed.   Results from last 7 days   Lab Units 02/02/22  0855 02/01/22  0602 01/31/22  1819   SODIUM mmol/L 141 138 141   POTASSIUM mmol/L 3.6 3.8 4.0   CHLORIDE mmol/L 94* 96* 96*   CO2 mmol/L 33.0* 27.9 32.3*   BUN mg/dL 25* 26* 29*   CREATININE mg/dL 1.15 1.21 1.41*   CALCIUM mg/dL 9.7 9.8 10.3   BILIRUBIN mg/dL  --   --  2.1*   ALK PHOS U/L  --   --  135*   ALT (SGPT) U/L  --   --  10   AST (SGOT) U/L  --   --  37   GLUCOSE mg/dL 159* 148* 152*     Results from last 7 days   Lab Units 02/02/22  0546 01/31/22  1819   HEMOGLOBIN g/dL  --  16.3   HEMATOCRIT %  --  49.9   TRIGLYCERIDES mg/dL 142  --      COVID19   Date Value Ref Range Status   01/31/2022 Not Detected Not Detected - Ref. Range Final     No results found for: HGBA1C      Pertinent Medications Reviewed.     Current Nutrition Orders & Evaluation of Intake       Oral Nutrition     Current PO Diet Diet Regular; Cardiac   Supplement Orders Placed This Encounter      Dietary Nutrition Supplements Ensure Enlive       Nutrition Diagnosis         Nutrition Dx Problem 1 Severe malnutrition related to decreased ability to consume sufficient energy as evidenced by decreased appetite., unintended wt change., body composition changes. and patient report.       Nutrition Intervention         Ensure Enlive 3 times daily.  Fresh fruit with meals.     Medical Nutrition Therapy/Nutrition Education          Learner     Readiness Patient  Acceptance     Method     Response Explanation  Verbalizes understanding     Monitor/Evaluation        Monitor PO intake, Supplement intake       Nutrition Discharge Plan         To be determined       Electronically signed by:  Lili Mcneill RD  02/02/22 13:06 EST

## 2022-02-02 NOTE — PROCEDURES
Bedside thoracic ultrasound:    Left showed B lines, curtain sign seen.    Right side showed moderate to large effusion with anechoic space between lung and diaphragm.     Site marked for thoracentesis.    Images stored online.

## 2022-02-03 NOTE — PLAN OF CARE
Goal Outcome Evaluation:           Progress: no change  Outcome Summary: VSS. Patient is very pleasant.  O2 sats have been maintaining on 8L. No acute changes overnight and no complaints. Continuing to monitor. FARIDA,RN

## 2022-02-03 NOTE — PLAN OF CARE
Goal Outcome Evaluation:  Plan of Care Reviewed With: patient        Progress: no change  Outcome Summary: VSS. No acute changes; no complaints. Continuing to monitor.  Nadine Obrien RN

## 2022-02-03 NOTE — PROGRESS NOTES
Patient Name: Tres Jackson Date of Admission: 2022   : 1940 MRN: [id number]   Location: [place] [address]/1 CSN: [id number]       [facility]   Follow Up  DATE: 2022  Primary Care Provider  Erick Pedroza MD    Subjective   Subjective     Subjective  Patient seen on rounds this morning, he had already had his thoracentesis, was breathing easier.  He had no new complaints.  I discussed his case with Dr. Damon, and I called his wife later in the evening.    Objective   Objective     ROS    Review of Systems   Constitutional: Positive for appetite change and fatigue. Negative for fever.   HENT: Positive for congestion. Negative for dental problem, ear pain and trouble swallowing.    Eyes: Negative for pain and visual disturbance.   Respiratory: Positive for cough and shortness of breath.    Cardiovascular: Positive for palpitations and leg swelling. Negative for chest pain.   Gastrointestinal: Positive for nausea. Negative for abdominal pain, diarrhea and vomiting.   Endocrine: Negative for cold intolerance and heat intolerance.   Genitourinary: Negative for dysuria, flank pain and hematuria.   Musculoskeletal: Positive for arthralgias. Negative for gait problem and neck pain.   Skin: Negative for rash and wound.   Allergic/Immunologic: Negative for immunocompromised state.   Neurological: Positive for weakness. Negative for dizziness, syncope and headaches.   Psychiatric/Behavioral: Negative for agitation, confusion, self-injury and suicidal ideas.       Exam    Vitals Signs    Temp:  [97.3 °F (36.3 °C)-98.1 °F (36.7 °C)] 97.7 °F (36.5 °C)  Heart Rate:  [78-95] 89  Resp:  [18-20] 18  BP: (110-180)/() 123/74  Flow (L/min):  [8-10] 8    Intake & Output (last 3 days)        0701   0700  0701   0700  0701   0700    P.O.  500 20    IV Piggyback 300      Total Intake(mL/kg) 300 (4) 500 (6.6) 20 (0.3)    Urine (mL/kg/hr)  1550 (0.8) 200 (0.2)    Total Output  1550  200    Net +300 -1050 -180           Urine Unmeasured Occurrence  4 x            Physical Exam  Constitutional:       General: He is not in acute distress.     Appearance: Normal appearance. He is not toxic-appearing.   HENT:      Head: Atraumatic.      Right Ear: External ear normal.      Left Ear: External ear normal.      Nose: Nose normal.      Mouth/Throat:      Mouth: Mucous membranes are moist.   Eyes:      General:         Right eye: No discharge.         Left eye: No discharge.      Extraocular Movements: Extraocular movements intact.      Pupils: Pupils are equal, round, and reactive to light.   Cardiovascular:      Rate and Rhythm: Normal rate. Rhythm irregular.      Pulses: Normal pulses.      Heart sounds: Normal heart sounds. No murmur heard.  No gallop.    Pulmonary:      Effort: Pulmonary effort is normal. No respiratory distress.      Breath sounds: Rhonchi and rales present. No wheezing.      Comments: Improved breath sounds on the right that is post thoracentesis.  Abdominal:      General: There is no distension.      Palpations: Abdomen is soft. There is no mass.      Tenderness: There is no abdominal tenderness. There is no guarding.   Musculoskeletal:         General: No swelling or tenderness.      Cervical back: No tenderness.      Right lower leg: Edema present.      Left lower leg: Edema present.      Comments: Edema improved, decreased 2-1+, mainly in the pretibial region   Skin:     General: Skin is warm and dry.      Findings: No rash.   Neurological:      General: No focal deficit present.      Mental Status: He is alert and oriented to person, place, and time. Mental status is at baseline.      Motor: Weakness present.      Gait: Gait abnormal.   Psychiatric:         Mood and Affect: Mood normal.         Thought Content: Thought content normal.         Labs:        Lab 01/31/22  1819   WBC 8.21   HEMOGLOBIN 16.3   HEMATOCRIT 49.9   PLATELETS 142           Lab 02/02/22  0855  02/01/22  0602   SODIUM 141 138   POTASSIUM 3.6 3.8   CHLORIDE 94* 96*   CO2 33.0* 27.9   BUN 25* 26*       Assessment / Plan     Current Medications    Scheduled Meds:allopurinol, 100 mg, Oral, Daily  amLODIPine, 5 mg, Oral, Q24H  azithromycin, 500 mg, Intravenous, Daily  carvedilol, 12.5 mg, Oral, BID With Meals  cefTRIAXone, 1 g, Intravenous, Daily  famotidine, 20 mg, Oral, Nightly  furosemide, 60 mg, Intravenous, BID  levothyroxine, 100 mcg, Oral, Daily  lisinopril, 20 mg, Oral, Q12H  potassium chloride, 30 mEq, Oral, BID With Meals  sodium chloride, 10 mL, Intravenous, Q12H      Continuous Infusions:   PRN Meds:.•  acetaminophen **OR** acetaminophen **OR** acetaminophen  •  aluminum-magnesium hydroxide-simethicone  •  senna-docusate sodium **AND** polyethylene glycol **AND** bisacodyl **AND** bisacodyl  •  calcium carbonate  •  melatonin  •  nitroglycerin  •  ondansetron  •  sodium chloride     Impression    1. A/C Diastolic HF: As noted, this appears to be more so related to the patient noncompliance with the diuretics as to any acute coronary insult.  Patient without any ischemic type chest pains, enzymes are negative.  His BNP is up from around 8000 to 17,000 at the time of admission. We will continue with IV diuretics and follow-up labs daily---> GFR is still much better than baseline, will continue with current diuretics.  Needs a little potassium.  His wife is estimated consult her cardiologist, Dr. Guzman, will enter that tomorrow 2/3.     2. Persistent AFIB: Patient was switched from atenolol to Coreg given the above. We will likely titrate dose shortly given his persistent hypertension. He had been noncompliant with his Eliquis as well, we gave him a dose this morning, but I am holding that going forward given his need for thoracentesis---> will likely resume Eliquis this evening 2/2.     3. A/C Hypoxic Resp Failure: Appears multi factorial. Secondary to the CHF above as well as the pleural effusion  noted below. He was on no home O2, currently requiring somewhere between 30 to 40% trach collar. Hopefully thoracentesis will significantly improve his oxygenation as well as continued diuresis and empiric antibiotics---> still requiring 35-40% trach collar as of 2/2 evening.     4. R Pleural Effusion: Was confirmed on chest CT today and pulmonary has been notified. As noted he only received 1 dose of Eliquis earlier this morning, it since been discontinued---> s/p tap this a.m, 1.9 L removed, appeared transudate by report.     5. Possible CAP: Rocephin/Zithromax #3. We will follow up any culture sent, but otherwise treat for full course.     6. Hypertension. This is curiously uncontrolled at present, despite several doses of his routine meds. Have room to advance carvedilol, but he was just started on this yesterday. Will give him a few doses of amlodipine, and certainly back off on this if blood pressure improves with further diuresis.---> BP much improved today 2/2, continue current dosing.     7. CKD 3b. GFR of 48 on admission is actually better than his baseline, and may be related to volume overload. He typically has his labs done at an outside facility.---> GFR still well above normal as of 2/2.  Continuing diuresis as noted above.  Will monitor labs daily.     8. Persistent Nausea/Anorexia: This appears to be improving already with treatment of his above conditions.  May be this is related to significant hypoxia.     9. Gait Dysfunction/Recurrent Falls: OT/PT have been consulted. Hopefully will be steadier on his feet with improved oxygenation. He is fallen several times at home.     10. DVT Prophylaxis: Via mechanical means while we are awaiting thoracentesis.    Problem List    Active Problems:    Acute on chronic respiratory failure with hypoxia (HCC)    Severe malnutrition (CMS/HCC)      Plan:  as above      Electronically signed by Erick Pedroza MD, 2/2/2022, 19:40 EST.

## 2022-02-03 NOTE — PROGRESS NOTES
Pulmonary / Critical Care Progress Note      Patient Name: Tres Jackson  : 1940  MRN: [id number]  Primary Care Physician:  Erick Pedroza MD  Date of admission: 2022    Subjective   Subjective   Follow-up for large right pleural effusion, acute hypoxic respiratory failure.    Over past 24 hours, underwent right thoracentesis with drainage of 1.9 L .  Cultures and cytology pending.  Continues on diuretics.    No acute events overnight.     This morning,   Lying in bed on 6 L via trach collar  Feels breathing has improved  Nonproductive cough  Dyspnea improved  Diuresing well  No chest pain  No fever or chills  Weak and fatigued    Review of Systems  General:  No Fatigue, No Fever.   HEENT: No dysphagia, No Visual Changes, no rhinorrhea  Respiratory:  +  Nonproductive cough, + Dyspnea (improving), Clear phlegm, No Pleuritic Pain, No wheezing, no hemoptysis  Cardiovascular: Denies chest pain, denies palpitations, + MAXWELL, No Chest Pressure, +  orthopnea  Gastrointestinal:  No Abdominal Pain, No Nausea, No Vomiting, No Diarrhea  Genitourinary:  No Dysuria, No Frequency, No Hesitancy  Musculoskeletal: No muscle pain or swelling    Objective   Objective     Vitals:   Temp:  [97.3 °F (36.3 °C)-98.1 °F (36.7 °C)] 97.8 °F (36.6 °C)  Heart Rate:  [] 116  Resp:  [18-20] 20  BP: ()/(63-88) 119/76  Flow (L/min):  [8-10] 8     Physical Exam   Vital Signs Reviewed   General: WDWN male, Awake and Alert, NAD on trach collar 6 L     HEENT:  PERRL, EOMI.  OP, nares clear, no sinus tenderness, trach stoma without redness  Neck:  Supple, no JVD, no thyromegaly  Lymph: no axillary, cervical, supraclavicular lymphadenopathy noted bilaterally  Chest: good aeration, diminished bilaterally, tympanic to percussion, no work of breathing noted  CV: RRR, no MGR, pulses 2+, equal  Abd:  Soft, NT, ND, + BS, no HSM  EXT:  no clubbing, no cyanosis, no edema, no joint tenderness  Neuro:  A&Ox3, CN grossly intact, no focal  deficits  Skin: No rashes or lesions noted    Result Review    Result Review:  I have personally reviewed the results from the time of this admission to 2/3/2022 07:03 EST and agree with these findings:  [x]  Laboratory  [x]  Microbiology  [x]  Radiology  [x]  EKG/Telemetry   []  Cardiology/Vascular   []  Pathology  []  Old records  []  Other:  Most notable findings include: K 3.5, Cr 1.32    2/2 CXR improved right pleural effusion    Assessment/Plan   Assessment / Plan     Active Hospital Problems:  Active Hospital Problems    Diagnosis    • Severe malnutrition (CMS/HCC)    • Acute on chronic respiratory failure with hypoxia (HCC)      Impression:   Large right pleural effusion: s/p thoracentesis, transudative  Acute hypoxic respiratory failure  Acute on chronic diastolic heart failure: proBNP 16,990  CKD stage III  Concern for community acquired pneumonia of unspecified   HTN  DM 2  Afib: non-compliant with Eliquis  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  Appears to be transudative effusion.  Continue diuresis with Lasix 60 mg IV BID.  Trend renal panel and electrolytes.  Continue O2 to keep sats >90%.  Does not use O2 at home.  Procal 0.12 and sputum culture pending. Can likely discontineu Azithromycin and Ceftriaxone in the next 1-2 days of cultures remain negative.    Continue bronchopulmonary hygiene.  Continue Eliquis.  Encourage activity.  Up to chair as tolerated.     DVT prophylaxis:  Medical and mechanical DVT prophylaxis orders are present.    CODE STATUS:   Level Of Support Discussed With: Patient  Code Status (Patient has no pulse and is not breathing): CPR (Attempt to Resuscitate)  Medical Interventions (Patient has pulse or is breathing): Full Support    I personally reviewed pertinent labs, imaging and provider notes. Discussed with bedside nurse and will discuss with primary service.      Electronically signed by TINA Briones, 02/03/22, 10:10 AM EST.  Electronically signed by Cornelio Damon MD, 2/3/2022, 07:03 EST.

## 2022-02-03 NOTE — PROGRESS NOTES
Patient Name: Tres Jackson Date of Admission: 2022   : 1940 MRN: [id number]   Location: [place] [address] 258/1 CSN: [id number]       [facility]   Follow Up  DATE: 2/3/2022  Primary Care Provider  Erick Pedroza MD    Subjective   Subjective     Subjective  Patient seen on rounds this morning, he had already had his thoracentesis, was breathing easier.  He had no new complaints.  I discussed his case with Dr. Damon, and I called his wife later in the evening.---> Patient seen on rounds this morning 2/3, he was sit on the side of bed in no distress.  His voice is weaker, its harder for him to communicate, but he nodded appropriately.  Chart reviewed to include consultants note.    Objective   Objective     ROS    Review of Systems   Constitutional: Positive for appetite change and fatigue. Negative for fever.   HENT: Positive for congestion. Negative for dental problem, ear pain and trouble swallowing.    Eyes: Negative for pain and visual disturbance.   Respiratory: Positive for cough and shortness of breath.    Cardiovascular: Positive for palpitations and leg swelling. Negative for chest pain.   Gastrointestinal: Negative for abdominal pain, diarrhea and vomiting.   Endocrine: Negative for cold intolerance and heat intolerance.   Genitourinary: Negative for dysuria, flank pain and hematuria.   Musculoskeletal: Positive for arthralgias. Negative for gait problem and neck pain.   Skin: Negative for rash and wound.   Allergic/Immunologic: Negative for immunocompromised state.   Neurological: Positive for weakness. Negative for dizziness, syncope and headaches.   Psychiatric/Behavioral: Negative for agitation, confusion, self-injury and suicidal ideas.       Exam    Vitals Signs    Temp:  [97.6 °F (36.4 °C)-98.1 °F (36.7 °C)] 97.9 °F (36.6 °C)  Heart Rate:  [] 84  Resp:  [18-20] 20  BP: ()/(62-88) 99/62  Flow (L/min):  [7-10] 8    Intake & Output (last 3 days)        0701   0700  02/01 0701  02/02 0700 02/02 0701  02/03 0700 02/03 0701  02/04 0700    P.O.  500 20 240    IV Piggyback 300       Total Intake(mL/kg) 300 (4) 500 (6.6) 20 (0.3) 240 (3.3)    Urine (mL/kg/hr)  1550 (0.8) 450 (0.3) 100 (0.2)    Total Output  1550 450 100    Net +300 -1050 -430 +140            Urine Unmeasured Occurrence  4 x             Physical Exam  Constitutional:       General: He is not in acute distress.     Appearance: Normal appearance. He is not toxic-appearing.   HENT:      Head: Atraumatic.      Right Ear: External ear normal.      Left Ear: External ear normal.      Nose: Nose normal.      Mouth/Throat:      Mouth: Mucous membranes are moist.   Eyes:      General:         Right eye: No discharge.         Left eye: No discharge.      Extraocular Movements: Extraocular movements intact.      Pupils: Pupils are equal, round, and reactive to light.   Cardiovascular:      Rate and Rhythm: Normal rate. Rhythm irregular.      Pulses: Normal pulses.      Heart sounds: Normal heart sounds. No murmur heard.  No gallop.    Pulmonary:      Effort: Pulmonary effort is normal. No respiratory distress.      Breath sounds: Rhonchi and rales present. No wheezing.      Comments: Improved breath sounds on the right that is post thoracentesis.---> Stable breath sounds 2/3.  Abdominal:      General: There is no distension.      Palpations: Abdomen is soft. There is no mass.      Tenderness: There is no abdominal tenderness. There is no guarding.   Musculoskeletal:         General: No swelling or tenderness.      Cervical back: No tenderness.      Right lower leg: Edema present.      Left lower leg: Edema present.      Comments: 1+ edema pretibial regions.   Skin:     General: Skin is warm and dry.      Findings: No rash.   Neurological:      General: No focal deficit present.      Mental Status: He is alert and oriented to person, place, and time. Mental status is at baseline.      Motor: Weakness present.      Gait: Gait  abnormal.   Psychiatric:         Mood and Affect: Mood normal.         Thought Content: Thought content normal.         Labs:        Lab 01/31/22  1819   WBC 8.21   HEMOGLOBIN 16.3   HEMATOCRIT 49.9   PLATELETS 142           Lab 02/03/22  0434 02/02/22  0855   SODIUM 142 141   POTASSIUM 3.5 3.6   CHLORIDE 97* 94*   CO2 32.1* 33.0*   BUN 30* 25*       Assessment / Plan     Current Medications    Scheduled Meds:allopurinol, 100 mg, Oral, Daily  amLODIPine, 5 mg, Oral, Q24H  apixaban, 5 mg, Oral, Q12H  azithromycin, 500 mg, Intravenous, Daily  carvedilol, 12.5 mg, Oral, BID With Meals  cefTRIAXone, 1 g, Intravenous, Daily  famotidine, 20 mg, Oral, Nightly  furosemide, 60 mg, Intravenous, BID  levothyroxine, 100 mcg, Oral, Daily  lisinopril, 20 mg, Oral, Q12H  potassium chloride, 30 mEq, Oral, BID With Meals  sodium chloride, 10 mL, Intravenous, Q12H      Continuous Infusions:   PRN Meds:.•  acetaminophen **OR** acetaminophen **OR** acetaminophen  •  aluminum-magnesium hydroxide-simethicone  •  senna-docusate sodium **AND** polyethylene glycol **AND** bisacodyl **AND** bisacodyl  •  calcium carbonate  •  melatonin  •  nitroglycerin  •  ondansetron  •  sodium chloride     Impression    1. A/C Diastolic HF: As noted, this appears to be more so related to the patient noncompliance with the diuretics as to any acute coronary insult.  Patient without any ischemic type chest pains, enzymes are negative.  His BNP is up from around 8000 to 17,000 at the time of admission. We will continue with IV diuretics and follow-up labs daily...GFR is still much better than baseline, will continue with current diuretics.  Needs a little potassium.  His wife is request that I consult her cardiologist, Dr. Guzman---> patient has not bumped his renal function yet, certainly not to his baseline, continue same diuretics 2/3.     2. Persistent AFIB: Patient was switched from atenolol to Coreg given the above. We will likely titrate dose shortly  given his persistent hypertension. He had been noncompliant with his Eliquis as well, we gave him a dose this morning, but I am holding that going forward given his need for thoracentesis---> resumed Eliquis yesterday evening 2/2.     3. A/C Hypoxic Resp Failure: Appears multi factorial. Secondary to the CHF above as well as the pleural effusion noted below. He was on no home O2, currently requiring somewhere between 30 to 40% trach collar. Hopefully thoracentesis will significantly improve his oxygenation as well as continued diuresis and empiric antibiotics---> still requiring 35-40% trach collar as of 2/3 morning.     4. R Pleural Effusion: Was confirmed on chest CT today and pulmonary has been notified. As noted he only received 1 dose of Eliquis earlier this morning, it since been discontinued---> s/p tap this yesterday 1.9 L removed, and it appears to be negative for any malignancy.     5. Possible CAP: Rocephin/Zithromax # 4 and pulmonology recommends we discontinue antibiotics in 1 to 2 days if cultures remain negative as of 2/3.     6. Hypertension. This is curiously uncontrolled at present, despite several doses of his routine meds. Have room to advance carvedilol, but he was just started on this yesterday. Will give him a few doses of amlodipine, and certainly back off on this if blood pressure improves with further diuresis...BP much improved today 2/2, continue current dosing---> blood pressure is too well controlled almost as of 2/3, will review his meds.     7. CKD 3b. GFR of 48 on admission is actually better than his baseline, and may be related to volume overload. He typically has his labs done at an outside facility.---> GFR still above normal as of 2/3.  Continuing diuresis as noted above.  Will monitor labs daily.     8. Persistent Nausea/Anorexia: This appears to be improving already with treatment of his above conditions.  May be this is related to significant hypoxia.     9. Gait  Dysfunction/Recurrent Falls: OT/PT have been consulted. Hopefully will be steadier on his feet with improved oxygenation. He is fallen several times at home.     10. DVT Prophylaxis: Via mechanical means while we are awaiting thoracentesis.    Problem List    Active Problems:    Acute on chronic respiratory failure with hypoxia (HCC)    Severe malnutrition (CMS/HCC)      Plan:  as above      Electronically signed by Erick Pedroza MD, 2/3/2022, 12:56 EST.

## 2022-02-04 NOTE — PROGRESS NOTES
Patient Name: Tres Jackson Date of Admission: 2022   : 1940 MRN: [id number]   Location: [place] [address] 258/1 CSN: [id number]       [facility]   Follow Up  DATE: 2022  Primary Care Provider  Erick Pedroza MD    Subjective   Subjective     Subjective  Patient seen on rounds this morning, he had already had his thoracentesis, was breathing easier.  He had no new complaints.  I discussed his case with Dr. Damon, and I called his wife later in the evening...Patient seen on rounds this morning 2/3, he was sit on the side of bed in no distress.  His voice is weaker, its harder for him to communicate, but he nodded appropriately.  Chart reviewed to include consultants note.---> Patient seen on rounds this early afternoon, he was up on the side the bed where he usually is, in no distress.  The trach collar is hanging a little low, not sure how much he is benefiting from this, but his sats were holding in the low 90s at least, so this is much better than when admitted.  He had no new concerns, just not happy being in the hospital in general.    Objective   Objective     ROS    Review of Systems   Constitutional: Positive for appetite change and fatigue. Negative for fever.   HENT: Positive for congestion. Negative for dental problem, ear pain and trouble swallowing.    Eyes: Negative for pain and visual disturbance.   Respiratory: Positive for shortness of breath.         Less cough, denies dyspnea at rest at least.   Cardiovascular: Negative for chest pain.   Gastrointestinal: Negative for abdominal pain, diarrhea and vomiting.   Endocrine: Negative for cold intolerance and heat intolerance.   Genitourinary: Negative for dysuria, flank pain and hematuria.   Musculoskeletal: Positive for arthralgias. Negative for gait problem and neck pain.   Skin: Negative for rash and wound.   Allergic/Immunologic: Negative for immunocompromised state.   Neurological: Positive for weakness. Negative for dizziness,  syncope and headaches.   Psychiatric/Behavioral: Negative for agitation, confusion, self-injury and suicidal ideas.       Exam    Vitals Signs    Temp:  [97.5 °F (36.4 °C)-97.9 °F (36.6 °C)] 97.9 °F (36.6 °C)  Heart Rate:  [61-88] 61  Resp:  [16-22] 16  BP: ()/(59-80) 107/65  Flow (L/min):  [3-10] 10    Intake & Output (last 3 days)       02/01 0701  02/02 0700 02/02 0701  02/03 0700 02/03 0701  02/04 0700 02/04 0701  02/05 0700    P.O. 500 20 895 240    IV Piggyback        Total Intake(mL/kg) 500 (6.6) 20 (0.3) 895 (12.8) 240 (3.4)    Urine (mL/kg/hr) 1550 (0.8) 450 (0.3) 600 (0.4)     Total Output 1550 450 600     Net -1050 -430 +295 +240            Urine Unmeasured Occurrence 4 x              Physical Exam  Constitutional:       General: He is not in acute distress.     Appearance: Normal appearance. He is not toxic-appearing.   HENT:      Head: Atraumatic.      Right Ear: External ear normal.      Left Ear: External ear normal.      Nose: Nose normal.      Mouth/Throat:      Mouth: Mucous membranes are moist.   Eyes:      General:         Right eye: No discharge.         Left eye: No discharge.      Extraocular Movements: Extraocular movements intact.      Pupils: Pupils are equal, round, and reactive to light.   Cardiovascular:      Rate and Rhythm: Normal rate. Rhythm irregular.      Pulses: Normal pulses.      Heart sounds: Normal heart sounds. No murmur heard.  No gallop.    Pulmonary:      Effort: Pulmonary effort is normal. No respiratory distress.      Breath sounds: Rhonchi and rales present. No wheezing.      Comments: Improved breath sounds on the right that is post thoracentesis.---> Stable breath sounds 2/3.  Abdominal:      General: There is no distension.      Palpations: Abdomen is soft. There is no mass.      Tenderness: There is no abdominal tenderness. There is no guarding.   Musculoskeletal:         General: No swelling or tenderness.      Cervical back: No tenderness.      Right lower  leg: Edema present.      Left lower leg: Edema present.      Comments: 1+ edema pretibial regions.   Skin:     General: Skin is warm and dry.      Findings: No rash.   Neurological:      General: No focal deficit present.      Mental Status: He is alert and oriented to person, place, and time. Mental status is at baseline.      Motor: Weakness present.      Gait: Gait abnormal.   Psychiatric:         Mood and Affect: Mood normal.         Thought Content: Thought content normal.         Labs:        Lab 01/31/22  1819   WBC 8.21   HEMOGLOBIN 16.3   HEMATOCRIT 49.9   PLATELETS 142           Lab 02/04/22  0423 02/03/22  0434   SODIUM 135* 142   POTASSIUM 4.3 3.5   CHLORIDE 97* 97*   CO2 24.2 32.1*   BUN 34* 30*       Assessment / Plan     Current Medications    Scheduled Meds:allopurinol, 100 mg, Oral, Daily  amLODIPine, 2.5 mg, Oral, Q24H  apixaban, 5 mg, Oral, Q12H  azithromycin, 500 mg, Intravenous, Daily  carvedilol, 12.5 mg, Oral, BID With Meals  cefTRIAXone, 1 g, Intravenous, Daily  famotidine, 20 mg, Oral, Nightly  furosemide, 60 mg, Intravenous, BID  levothyroxine, 100 mcg, Oral, Daily  lisinopril, 20 mg, Oral, Q12H  potassium chloride, 30 mEq, Oral, BID With Meals  sodium chloride, 10 mL, Intravenous, Q12H      Continuous Infusions:   PRN Meds:.•  acetaminophen **OR** acetaminophen **OR** acetaminophen  •  aluminum-magnesium hydroxide-simethicone  •  senna-docusate sodium **AND** polyethylene glycol **AND** bisacodyl **AND** bisacodyl  •  calcium carbonate  •  melatonin  •  nitroglycerin  •  ondansetron  •  sodium chloride     Impression    1. A/C Diastolic HF: As noted, this appears to be more so related to the patient noncompliance with the diuretics as to any acute coronary insult.  Patient without any ischemic type chest pains, enzymes are negative.  His BNP is up from around 8000 to 17,000 at the time of admission. We will continue with IV diuretics and follow-up labs daily...GFR is still much better than  baseline, will continue with current diuretics.  Needs a little potassium.  His wife is request that I consult her cardiologist, Dr. Guzman---> patient has still not bumped his renal function yet despite a significant drop in his BNP from 17 K to 5.6K, so continue same diuretics as of 2/4.       2. Persistent AFIB: Patient was switched from atenolol to Coreg given the above. We will likely titrate dose shortly given his persistent hypertension. He had been noncompliant with his Eliquis as well, we gave him a dose this morning, but I am holding that going forward given his need for thoracentesis---> resumed Eliquis yesterday evening 2/2 and rate is well controlled as of 2/4.     3. A/C Hypoxic Resp Failure: Appears multi factorial. Secondary to the CHF above as well as the pleural effusion noted below. He was on no home O2, currently requiring somewhere between 30 to 40% trach collar. Hopefully thoracentesis will significantly improve his oxygenation as well as continued diuresis and empiric antibiotics---> still requiring 35-40% trach collar 24/7 as of 2/4, and this is disappointing since I was hoping to wean him to nocturnal oxygen only    4. R Pleural Effusion: Was confirmed on chest CT today and pulmonary has been notified. As noted he only received 1 dose of Eliquis earlier this morning, it since been discontinued---> s/p tap 2/2 and had 1.9 L removed; it was negative for any obvious infection and for malignancy.     5. Possible CAP: Rocephin/Zithromax # 5 and pulmonology recommends we discontinue antibiotics in 1 to 2 days if cultures remain negative as of 2/4 = we will let them auto stop.     6. Hypertension. This is curiously uncontrolled at present, despite several doses of his routine meds. Have room to advance carvedilol, but he was just started on this yesterday. Will give him a few doses of amlodipine, and certainly back off on this if blood pressure improves with further diuresis...BP much improved  today 2/2, continue current dosing---> blood pressure is too well controlled almost as of 2/3, will review his meds = ditto 2/4, no room to titrate beta-blocker and will defer Entresto to cardiology as outpatient.    7. CKD 3b. GFR of 48 on admission is actually better than his baseline, and may be related to volume overload. He typically has his labs done at an outside facility.---> GFR still above normal as of 2/4, so continuing diuresis as noted above, monitoring labs daily.     8. Persistent Nausea/Anorexia: This appears to be improving already with treatment of his above conditions.  May be this is related to significant hypoxia.---> Since resolved.     9. Gait Dysfunction/Recurrent Falls: OT/PT have been consulted. Hopefully will be steadier on his feet with improved oxygenation. He has fallen several times at home.---> OT/PT saw him on admission, and released him to the [facility] care.     10. DVT Prophylaxis: Via mechanical means while we are awaiting thoracentesis.    Problem List    Active Problems:    Acute on chronic respiratory failure with hypoxia (HCC)    Severe malnutrition (CMS/HCC)      Plan:  as above      Electronically signed by Erick Pedroza MD, 2/4/2022, 16:00 EST.

## 2022-02-04 NOTE — PROGRESS NOTES
Pulmonary / Critical Care Progress Note      Patient Name: Tres Jackson  : 1940  MRN: [id number]  Primary Care Physician:  Erick Pedroza MD  Date of admission: 2022    Subjective   Subjective   Follow-up for large right pleural effusion, acute hypoxic respiratory failure.    Over past 24 hours, weaned oxygen.  Remained on diuretics.  Remains on antibiotics as well.  Tracheostomy care per respite therapy.    No acute events overnight.     This morning,   Lying in bed on 3 L via trach collar  Feels breathing has improved  Nonproductive cough  Dyspnea improved  No chest pain  No fever or chills  Weak and fatigued    Review of Systems  General:  No Fatigue, No Fever.   HEENT: No dysphagia, No Visual Changes, no rhinorrhea  Respiratory:  +  Nonproductive cough, + Dyspnea (improving), Clear phlegm, No Pleuritic Pain, No wheezing, no hemoptysis  Cardiovascular: Denies chest pain, denies palpitations, + MAXWELL, No Chest Pressure, +  orthopnea  Gastrointestinal:  No Abdominal Pain, No Nausea, No Vomiting, No Diarrhea  Genitourinary:  No Dysuria, No Frequency, No Hesitancy  Musculoskeletal: No muscle pain or swelling    Objective   Objective     Vitals:   Temp:  [97.5 °F (36.4 °C)-98.1 °F (36.7 °C)] 97.9 °F (36.6 °C)  Heart Rate:  [] 77  Resp:  [18-22] 18  BP: ()/(59-80) 104/68  Flow (L/min):  [3-8] 3     Physical Exam   Vital Signs Reviewed   General: WDWN male, Awake and Alert, NAD on trach collar 6 L     HEENT:  PERRL, EOMI.  OP, nares clear, no sinus tenderness, trach stoma without redness  Neck:  Supple, no JVD, no thyromegaly  Lymph: no axillary, cervical, supraclavicular lymphadenopathy noted bilaterally  Chest: good aeration, diminished bilaterally, tympanic to percussion, no work of breathing noted  CV: RRR, no MGR, pulses 2+, equal  Abd:  Soft, NT, ND, + BS, no HSM  EXT:  no clubbing, no cyanosis, no edema, no joint tenderness  Neuro:  A&Ox3, CN grossly intact, no focal deficits  Skin:  No rashes or lesions noted    Result Review    Result Review:  I have personally reviewed the results from the time of this admission to 2/4/2022 07:52 EST and agree with these findings:  [x]  Laboratory  [x]  Microbiology  [x]  Radiology  [x]  EKG/Telemetry   []  Cardiology/Vascular   []  Pathology  []  Old records  []  Other:  Most notable findings include:   Serum creatinine 1.32.  Serum sodium 135.    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  Appears to be transudative effusion.  Continue diuresis with Lasix 60 mg IV BID.  Trend renal panel and electrolytes.  Continue O2 to keep sats >90%.  Does not use O2 at home.  Procal 0.12 and sputum culture pending. Can likely discontineu Azithromycin and Ceftriaxone in the next 1-2 days of cultures remain negative.    Continue bronchopulmonary hygiene.  Continue Eliquis.  Encourage activity.  Up to chair as tolerated.     DVT prophylaxis:  Medical and mechanical DVT prophylaxis orders are present.    CODE STATUS:   Level Of Support Discussed With: Patient  Code Status (Patient has no pulse and is not breathing): CPR (Attempt to Resuscitate)  Medical Interventions (Patient has pulse or is breathing): Full Support    I personally reviewed pertinent labs, imaging and provider notes.   Discussed with bedside nurse and will discuss with  primary service.     Electronically signed by Cornelio Damno MD, 2/4/2022, 07:52 EST.

## 2022-02-04 NOTE — PLAN OF CARE
Goal Outcome Evaluation:            Pt doing well resp. Tapering down his oxygen, family at bedside no issues noted

## 2022-02-04 NOTE — PLAN OF CARE
Goal Outcome Evaluation:  Plan of Care Reviewed With: patient        Progress: improving  Outcome Summary: VSS. Good urine output tonight. Down to 3L per trach collar and tolerating well. Does desat with activity but recovers well. No acute changes. Continuing to monitor. FARIDA,RN

## 2022-02-05 NOTE — PROGRESS NOTES
[facility]   Hospitalist Progress Note  Date: 2022  Patient Name: Tres Jackson  : 1940  MRN: [id number]  Date of admission: 2022      Subjective   Subjective     Chief complaint: Shortness of breath    Summary:  81-year-old male with history of diabetes, CKD stage IIIb, essential hypertension, chronic atrial fibrillation, pulmonary hypertension, history of laryngeal carcinoma status post laryngectomy, hospitalized on 2022 with large right-sided pleural effusion, pulmonary consulted status post thoracentesis, acute hypoxic respiratory failure, acute on chronic diastolic CHF, diuretics ordered, with concern for [employer] pneumonia.  Completed antibiotics, discontinued after pro-Guillaume was not significantly elevated and cultures are negative.  Ongoing diuresis.    Interval follow-up: Patient seen and examined this morning, no acute distress, no acute major overnight events, patient eating breakfast this morning, remains on diuretics, trach collar 40% FiO2, remains weak and fatigued.  Continues to have cough, but overall continues to report improvement.  Telemetry reviewed, no acute major arrhythmic events, A. fib rate in the 90s.    Review of systems:  All systems reviewed and negative except for cough, shortness of breath, generalized fatigue, orthopnea    Objective   Objective     Vitals:   Temp:  [97.2 °F (36.2 °C)-98.1 °F (36.7 °C)] 97.9 °F (36.6 °C)  Heart Rate:  [] 82  Resp:  [16-18] 16  BP: ()/(59-77) 98/67  Flow (L/min):  [10] 10  Physical Exam    Constitutional: Awake, alert, no acute distress, sitting in bedside chair, trach collar in place   Eyes: Pupils equal, sclerae anicteric, no conjunctival injection   HENT: NCAT, mucous membranes moist   Neck: Supple, tracheostomy   Respiratory: Air entry bilaterally, very fine crackles   Cardiovascular: RRR, no murmurs, rubs, or gallops, palpable pedal pulses bilaterally   Gastrointestinal: Positive bowel sounds, soft,  nontender, nondistended   Musculoskeletal: No bilateral ankle edema, no clubbing or cyanosis to extremities   Psychiatric: Appropriate affect, cooperative   Neurologic: Oriented x 3, strength symmetric in all extremities, Cranial Nerves grossly intact to confrontation   Skin: No rashes   Result Review    Result Review:  I have personally reviewed the results from the time of this admission to 2/5/2022 14:58 EST and agree with these findings:  [x]  Laboratory   CBC    CBC 1/31/22   WBC 8.21   RBC 5.10   Hemoglobin 16.3   Hematocrit 49.9   MCV 97.8 (A)   MCH 32.0   MCHC 32.7   RDW 16.8 (A)   Platelets 142   (A) Abnormal value            BMP    BMP 2/3/22 2/4/22 2/5/22   BUN 30 (A) 34 (A) 31 (A)   Creatinine 1.32 (A) 1.32 (A) 1.36 (A)   Sodium 142 135 (A) 139   Potassium 3.5 4.3 4.0   Chloride 97 (A) 97 (A) 95 (A)   CO2 32.1 (A) 24.2 33.1 (A)   Calcium 9.2 9.1 9.5   (A) Abnormal value       Comments are available for some flowsheets but are not being displayed.           LIVER FUNCTION TESTS:      Lab 02/01/22  0602 01/31/22  1819   TOTAL PROTEIN  --  7.8   ALBUMIN  --  4.40   GLOBULIN  --  3.4   ALT (SGPT)  --  10   AST (SGOT)  --  37   BILIRUBIN  --  2.1*   ALK PHOS  --  135*   AMYLASE 20*  --    LIPASE 17  --        [x]  Microbiology   Blood Culture   Date Value Ref Range Status   01/31/2022 No growth at 4 days  Preliminary   01/31/2022 No growth at 4 days  Preliminary     No results found for: BCIDPCR, CXREFLEX, CSFCX, CULTURETIS  No results found for: CULTURES, HSVCX, URCX  No results found for: EYECULTURE, GCCX, HSVCULTURE, LABHSV  No results found for: LEGIONELLA, MRSACX, MUMPSCX, MYCOPLASCX  No results found for: NOCARDIACX, STOOLCX  No results found for: THROATCX, UNSTIMCULT, URINECX, CULTURE, VZVCULTUR  No results found for: VIRALCULTU, WOUNDCX    [x]  Radiology CT Chest Without Contrast Diagnostic    Result Date: 2/1/2022  PROCEDURE: CT CHEST WO CONTRAST DIAGNOSTIC  [facility], 1/31/2022, 20:05.  INDICATIONS: Pneumonia, effusion, or abscess suspected, xray done.  Shortness of breath and weakness.  Hx trach.  TECHNIQUE: CT images were created without the administration of contrast material.   PROTOCOL:   Standard imaging protocol performed    RADIATION:   DLP: 229mGy*cm   Automated exposure control was utilized to minimize radiation dose.  FINDINGS:  A tracheostomy is noted.  There is interlobular septal thickening suggesting interstitial pulmonary edema.  There is a large right pleural effusion with right basilar atelectasis.  There is a small left pleural effusion with left basilar atelectasis.  The heart is enlarged, with partially calcified atherosclerotic disease in the coronary arteries.  The great vessels are normal in caliber.  No abnormally enlarged lymph nodes are identified.  Partial evaluation of the upper abdomen demonstrates cholelithiasis.  No aggressive osseous lesions are identified.        1. Interlobular septal thickening, suggesting interstitial pulmonary edema. 2. Large right and small left pleural effusions with bibasilar atelectasis.  A superimposed component of pneumonia is possible. 3. Cardiomegaly. 4. Cholelithiasis.      SAMI ZAMUDIO MD       Electronically Signed and Approved By: SAMI ZAMUDIO MD on 2/01/2022 at 13:20             XR Chest 1 View    Result Date: 2/4/2022  PROCEDURE: XR CHEST 1 VW  COMPARISON: [facility], CR, XR CHEST 1 VW, 2/02/2022, 9:39.  INDICATIONS: pleural effusion  FINDINGS:  There is elevation of the right hemidiaphragm again.  There is improved aeration in the left mid and lower lung.  There is a new curvilinear opacity projecting over the left suprahilar region.  This may be external to the patient.  There is persisting consolidation in the right mid and lower lung, with a small amount of right pleural fluid.  Cardiomegaly is again present.  There is probable pulmonary vascular congestion.  Surgical  clips are again noted in the lower neck.         1. Improving aeration in the left and lower lung.  2. Persistent consolidation in the right mid and lower lung with small pleural effusion.  3. Cardiomegaly with pulmonary vascular congestion.       JOEY CALVERT MD       Electronically Signed and Approved By: JOEY CALVERT MD on 2/04/2022 at 8:57             XR Chest 1 View    Result Date: 2/2/2022  PROCEDURE: XR CHEST 1 VW  COMPARISON: [facility], CR, XR CHEST 1 VW, 1/31/2022, 20:05.  INDICATIONS: Thoracentesis  FINDINGS:  Stable large cardiomegaly.  Persistent but smaller right pleural effusion.  No visible pneumothorax.       Persistent but smaller right pleural effusion.  No pneumothorax.       PEREZ AGUILERA MD       Electronically Signed and Approved By: PEREZ AGUILERA MD on 2/02/2022 at 10:05             XR Chest 1 View    Result Date: 1/31/2022  PROCEDURE: XR CHEST 1 VW  COMPARISON: [facility], CR, CHEST PA/AP & LAT 2V, 4/17/2020, 16:05.  INDICATIONS: SHORT OF BREATH  FINDINGS:   The cardiac silhouette is enlarged.  There is a large amount of consolidation/effusion in the right hemithorax.  No evidence of pneumothorax.  IMPRESSION: Enlarged cardiac silhouette.  Large amount of effusion/consolidation in the right hemithorax.   JESSICA KAUFFMAN MD       Electronically Signed and Approved By: JESSICA KAUFFMAN MD on 1/31/2022 at 20:30               [x]  EKG/Telemetry   []  Cardiology/Vascular   []  Pathology  [x]  Old records  []  Other:    Assessment/Plan   Assessment / Plan     Assessment/Plan:  Assessment:  Acute on chronic diastolic CHF  Persistent atrial fibrillation  Long-term anticoagulation with Eliquis  Acute on chronic hypoxemic respiratory failure  Right pleural effusion status post thoracentesis  Concern for community-acquired pneumonia  Essential hypertension  CKD stage IIIb  Persistent anorexia  Gait dysfunction  Recurrent falls  Pulmonary artery hypertension    Plan:  Labs and  imaging reviewed  Discussed with Dr. Montalvo, recommendations appreciated  Continue diuretics Lasix 60 mg IV twice daily  Continue telemetry monitoring  Continue potassium replacement 30 mEq 2 times a day with meals  Strict I's and O's  Daily weights  Continue Coreg 12.5 mg p.o. twice daily  Continue Eliquis 5 mg p.o. twice daily  Continue Norvasc 2.5 mg daily  Out of bed to chair  Trach collar per pulmonary  A.m. labs  Full code  VTE prophylaxis with Eliquis  Clinical course to dictate further management  Discussed with nurse at the bedside    DVT prophylaxis:  Medical and mechanical DVT prophylaxis orders are present.    CODE STATUS:   Level Of Support Discussed With: Patient  Code Status (Patient has no pulse and is not breathing): CPR (Attempt to Resuscitate)  Medical Interventions (Patient has pulse or is breathing): Full Support        Electronically signed by Olayinka Maya MD, 02/05/22, 2:58 PM EST.

## 2022-02-05 NOTE — PLAN OF CARE
Goal Outcome Evaluation:  Plan of Care Reviewed With: patient  no complaints of pain or discomfort. Out of the bed to the chair for meals today. VSS  afib on the cardiac monitor.

## 2022-02-05 NOTE — PROGRESS NOTES
Pulmonary / Critical Care Progress Note      Patient Name: Tres Jackson  : 1940  MRN: [id number]  Primary Care Physician:  Erick Pedroza MD  Date of admission: 2022    Subjective   Subjective   Follow-up for large right pleural effusion, acute hypoxic respiratory failure.    Over past 24 hours, weaned oxygen.  Remained on diuretics.  Remains on antibiotics as well.  Tracheostomy care per respite therapy.    No acute events overnight.     This morning,   Sitting up in chair, no acute distress  Bedside pulse ox shows 98%, on trach collar 40% fi02  Non productive cough, feels betterDyspnea improved  No chest pain  No fever or chills  Weak and fatigued    Review of Systems  General:  No Fatigue, No Fever.   HEENT: No dysphagia, No Visual Changes, no rhinorrhea  Respiratory:  +  Nonproductive cough, + Dyspnea (improving), Clear phlegm, No Pleuritic Pain, No wheezing, no hemoptysis  Cardiovascular: Denies chest pain, denies palpitations, + MAXWELL, No Chest Pressure, +  orthopnea  Gastrointestinal:  No Abdominal Pain, No Nausea, No Vomiting, No Diarrhea  Genitourinary:  No Dysuria, No Frequency, No Hesitancy  Musculoskeletal: No muscle pain or swelling    Objective   Objective     Vitals:   Temp:  [97.2 °F (36.2 °C)-98.1 °F (36.7 °C)] 97.9 °F (36.6 °C)  Heart Rate:  [] 82  Resp:  [16-18] 16  BP: ()/(59-77) 98/67  Flow (L/min):  [10] 10     Physical Exam   Vital Signs Reviewed   General: WDWN male, Awake and Alert, NAD on trach collar 6 L     HEENT:  PERRL, EOMI.  OP, nares clear, no sinus tenderness, trach stoma without redness  Neck:  Supple, no JVD, no thyromegaly  Lymph: no axillary, cervical, supraclavicular lymphadenopathy noted bilaterally  Chest: good aeration, diminished bilaterally, tympanic to percussion, no work of breathing noted  CV: RRR, no MGR, pulses 2+, equal  Abd:  Soft, NT, ND, + BS, no HSM  EXT:  no clubbing, no cyanosis, no edema, no joint tenderness  Neuro:  A&Ox3, CN  grossly intact, no focal deficits  Skin: No rashes or lesions noted    Result Review    Result Review:  I have personally reviewed the results from the time of this admission to 2/5/2022 14:38 EST and agree with these findings:  [x]  Laboratory  [x]  Microbiology  [x]  Radiology  [x]  EKG/Telemetry   []  Cardiology/Vascular   []  Pathology  []  Old records  []  Other:  Most notable findings include:   Serum creatinine 1.36  Serum sodium 139    2/2 CXR improved right pleural effusion    Assessment/Plan   Assessment / Plan     Active Hospital Problems:  Active Hospital Problems    Diagnosis    • Severe malnutrition (CMS/HCC)    • Acute on chronic respiratory failure with hypoxia (HCC)      Impression:   Large right pleural effusion: s/p thoracentesis, transudative  Acute hypoxic respiratory failure  Acute on chronic diastolic heart failure: proBNP 16,990  CKD stage III  Concern for community acquired pneumonia of unspecified   HTN  DM 2  Afib: non-compliant with Eliquis  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  Appears to be transudative effusion.  Continue diuresis with Lasix 60 mg IV BID.  Trend renal panel and electrolytes.  Continue O2 to keep sats >90%.  Does not use O2 at home.  Procal 0.12 cultures neg. Will d/c abx at this time   Continue bronchopulmonary hygiene.  Continue Eliquis.  Encourage activity. Up to chair as tolerated.     DVT prophylaxis:  Medical and mechanical DVT prophylaxis orders are present.    CODE STATUS:   Level Of Support Discussed With: Patient  Code Status (Patient has no pulse and is not breathing): CPR (Attempt to Resuscitate)  Medical Interventions (Patient has pulse or is breathing): Full Support    I personally reviewed pertinent labs, imaging and provider notes.   Discussed with bedside nurse and will discuss with primary service.     Electronically signed by  Oli Montalvo MD, 2/5/2022, 14:38 EST.

## 2022-02-06 NOTE — PROGRESS NOTES
[facility]   Hospitalist Progress Note  Date: 2022  Patient Name: Tres Jackson  : 1940  MRN: [id number]  Date of admission: 2022      Subjective   Subjective   Chief complaint: Shortness of breath     Summary:  81-year-old male with history of diabetes, CKD stage IIIb, essential hypertension, chronic atrial fibrillation, pulmonary hypertension, history of laryngeal carcinoma status post laryngectomy, hospitalized on 2022 with large right-sided pleural effusion, pulmonary consulted status post thoracentesis, acute hypoxic respiratory failure, acute on chronic diastolic CHF, diuretics ordered, with concern for [employer] pneumonia.  Completed antibiotics, discontinued after pro-Guillaume was not significantly elevated and cultures are negative.  Ongoing diuresis.     Interval follow-up: Patient seen and examined this morning, no acute distress, no acute major overnight events, laying in bed, did not have his voice box handy to speak, continues to tolerate diuretics, trach collar 40% FiO2 as of this morning with flow rate set to 9 L/min, continues to have generalized weakness and fatigue.  Slowly improving with respect to his respiratory status.  Telemetry reviewed, no acute major arrhythmic events, A. fib rate in the 70s.  Creatinine down to 1.28, potassium at 4.3, sodium 136, white blood cell count 5000.  Magnesium corrected to 1.9.  Chest x-ray from this morning showing improving consolidation and effusions in the lung bases.     Review of systems:  All systems reviewed and negative except for cough, shortness of breath, generalized fatigue, orthopnea at times    Objective   Objective     Vitals:   Temp:  [97.3 °F (36.3 °C)-98.2 °F (36.8 °C)] 98.1 °F (36.7 °C)  Heart Rate:  [64-82] 70  Resp:  [16-18] 18  BP: ()/(50-87) 132/87  Flow (L/min):  [9-10] 9  Physical Exam    Constitutional: Awake, alert, no acute distress, laying in bed, trach collar in place              Eyes: Pupils  equal, sclerae anicteric, no conjunctival injection              HENT: NCAT, mucous membranes moist              Neck: Supple, tracheostomy              Respiratory: Air entry bilaterally, very fine crackles              Cardiovascular: RRR, no murmurs, rubs, or gallops, palpable pedal pulses bilaterally              Gastrointestinal: Positive bowel sounds, soft, nontender, nondistended              Musculoskeletal: No bilateral ankle edema, no clubbing or cyanosis to extremities              Psychiatric: Appropriate affect, cooperative              Neurologic: Oriented x 3, strength symmetric in all extremities, Cranial Nerves grossly intact to confrontation              Skin: No rashes     Result Review    Result Review:  I have personally reviewed the results from the time of this admission to 2/6/2022 10:35 EST and agree with these findings:  [x]  Laboratory   CBC    CBC 1/31/22 2/6/22   WBC 8.21 5.68   RBC 5.10 4.68   Hemoglobin 16.3 15.0   Hematocrit 49.9 44.6   MCV 97.8 (A) 95.3   MCH 32.0 32.1   MCHC 32.7 33.6   RDW 16.8 (A) 15.9 (A)   Platelets 142 137 (A)   (A) Abnormal value            BMP    BMP 2/4/22 2/5/22 2/6/22   BUN 34 (A) 31 (A) 29 (A)   Creatinine 1.32 (A) 1.36 (A) 1.28 (A)   Sodium 135 (A) 139 136   Potassium 4.3 4.0 4.3   Chloride 97 (A) 95 (A) 94 (A)   CO2 24.2 33.1 (A) 30.0 (A)   Calcium 9.1 9.5 9.5   (A) Abnormal value       Comments are available for some flowsheets but are not being displayed.           LIVER FUNCTION TESTS:      Lab 02/06/22  0413 02/01/22  0602 01/31/22  1819   TOTAL PROTEIN 6.3  --  7.8   ALBUMIN 3.40*  --  4.40   GLOBULIN  --   --  3.4   ALT (SGPT) 7  --  10   AST (SGOT) 27  --  37   BILIRUBIN 1.2  --  2.1*   INDIRECT BILIRUBIN 0.7  --   --    BILIRUBIN DIRECT 0.5*  --   --    ALK PHOS 93  --  135*   AMYLASE  --  20*  --    LIPASE  --  17  --        [x]  Microbiology   Blood Culture   Date Value Ref Range Status   01/31/2022 No growth at 5 days  Final   01/31/2022 No  growth at 5 days  Final     No results found for: BCIDPCR, CXREFLEX, CSFCX, CULTURETIS  No results found for: CULTURES, HSVCX, URCX  No results found for: EYECULTURE, GCCX, HSVCULTURE, LABHSV  No results found for: LEGIONELLA, MRSACX, MUMPSCX, MYCOPLASCX  No results found for: NOCARDIACX, STOOLCX  No results found for: THROATCX, UNSTIMCULT, URINECX, CULTURE, VZVCULTUR  No results found for: VIRALCULTU, WOUNDCX    [x]  Radiology CT Chest Without Contrast Diagnostic    Result Date: 2/1/2022  PROCEDURE: CT CHEST WO CONTRAST DIAGNOSTIC  COMPARISON: [facility], CR, XR CHEST 1 VW, 1/31/2022, 20:05.  INDICATIONS: Pneumonia, effusion, or abscess suspected, xray done.  Shortness of breath and weakness.  Hx trach.  TECHNIQUE: CT images were created without the administration of contrast material.   PROTOCOL:   Standard imaging protocol performed    RADIATION:   DLP: 229mGy*cm   Automated exposure control was utilized to minimize radiation dose.  FINDINGS:  A tracheostomy is noted.  There is interlobular septal thickening suggesting interstitial pulmonary edema.  There is a large right pleural effusion with right basilar atelectasis.  There is a small left pleural effusion with left basilar atelectasis.  The heart is enlarged, with partially calcified atherosclerotic disease in the coronary arteries.  The great vessels are normal in caliber.  No abnormally enlarged lymph nodes are identified.  Partial evaluation of the upper abdomen demonstrates cholelithiasis.  No aggressive osseous lesions are identified.        1. Interlobular septal thickening, suggesting interstitial pulmonary edema. 2. Large right and small left pleural effusions with bibasilar atelectasis.  A superimposed component of pneumonia is possible. 3. Cardiomegaly. 4. Cholelithiasis.      SAMI ZAMUDIO MD       Electronically Signed and Approved By: SAMI ZAMUDIO MD on 2/01/2022 at 13:20             XR Chest 1 View    Result Date:  2/6/2022  PROCEDURE: XR CHEST 1 VW  COMPARISON: [facility], CR, XR CHEST 1 VW, 2/02/2022, 9:39.  [facility], CT, CT CHEST WO CONTRAST DIAGNOSTIC, 2/01/2022, 12:35.  [facility], CR, XR CHEST 1 VW, 1/31/2022, 20:05.  [facility], CR, XR CHEST 1 VW, 2/04/2022, 8:19.  INDICATIONS: HYPOXIA  FINDINGS:   Stable cardiomegaly.  The pulmonary vasculature is within normal limits.  There is improving consolidation/effusion in the lung bases.  No evidence of pneumothorax.  IMPRESSION: Cardiomegaly.  Improving consolidation/effusion in the lung bases.   JESSICA KAUFFMAN MD       Electronically Signed and Approved By: JESSICA KAUFFMAN MD on 2/06/2022 at 7:37             XR Chest 1 View    Result Date: 2/4/2022  PROCEDURE: XR CHEST 1 VW  COMPARISON: [facility], CR, XR CHEST 1 VW, 2/02/2022, 9:39.  INDICATIONS: pleural effusion  FINDINGS:  There is elevation of the right hemidiaphragm again.  There is improved aeration in the left mid and lower lung.  There is a new curvilinear opacity projecting over the left suprahilar region.  This may be external to the patient.  There is persisting consolidation in the right mid and lower lung, with a small amount of right pleural fluid.  Cardiomegaly is again present.  There is probable pulmonary vascular congestion.  Surgical clips are again noted in the lower neck.         1. Improving aeration in the left and lower lung.  2. Persistent consolidation in the right mid and lower lung with small pleural effusion.  3. Cardiomegaly with pulmonary vascular congestion.       JOEY CALVERT MD       Electronically Signed and Approved By: JOEY CALVERT MD on 2/04/2022 at 8:57             XR Chest 1 View    Result Date: 2/2/2022  PROCEDURE: XR CHEST 1 VW  COMPARISON: [facility], CR, XR CHEST 1 VW, 1/31/2022, 20:05.  INDICATIONS: Thoracentesis  FINDINGS:  Stable large cardiomegaly.  Persistent but smaller right pleural  effusion.  No visible pneumothorax.       Persistent but smaller right pleural effusion.  No pneumothorax.       PEREZ AGUILERA MD       Electronically Signed and Approved By: PEREZ AGUILERA MD on 2/02/2022 at 10:05             XR Chest 1 View    Result Date: 1/31/2022  PROCEDURE: XR CHEST 1 VW  COMPARISON: [facility], CR, CHEST PA/AP & LAT 2V, 4/17/2020, 16:05.  INDICATIONS: SHORT OF BREATH  FINDINGS:   The cardiac silhouette is enlarged.  There is a large amount of consolidation/effusion in the right hemithorax.  No evidence of pneumothorax.  IMPRESSION: Enlarged cardiac silhouette.  Large amount of effusion/consolidation in the right hemithorax.   JESSICA KAUFFMAN MD       Electronically Signed and Approved By: JESSICA KAUFFMAN MD on 1/31/2022 at 20:30               [x]  EKG/Telemetry   []  Cardiology/Vascular   []  Pathology  [x]  Old records  []  Other:    Assessment/Plan   Assessment / Plan     Assessment/Plan:  Assessment:  Acute on chronic diastolic CHF  Persistent atrial fibrillation  Long-term anticoagulation with Eliquis  Acute on chronic hypoxemic respiratory failure  Right pleural effusion status post thoracentesis  Concern for community-acquired pneumonia  Essential hypertension  CKD stage IIIb  Persistent anorexia  Gait dysfunction  Recurrent falls  Pulmonary artery hypertension     Plan:  Labs and imaging reviewed  Continue diuresis at this time  Discussed with Dr. Montalvo, recommendations appreciated  Continue diuretics Lasix 60 mg IV twice daily  Continue telemetry monitoring  Continue potassium replacement 30 mEq 2 times a day with meals  Strict I's and O's  Daily weights  Continue Coreg 12.5 mg p.o. twice daily  Continue Eliquis 5 mg p.o. twice daily  Continue Norvasc 2.5 mg daily  Out of bed to chair  Trach collar per pulmonary; weaning FiO2  A.m. labs  Full code  VTE prophylaxis with Eliquis  Clinical course to dictate further management  Discussed with nurse at the bedside    DVT  prophylaxis:  Medical and mechanical DVT prophylaxis orders are present.    CODE STATUS:   Level Of Support Discussed With: Patient  Code Status (Patient has no pulse and is not breathing): CPR (Attempt to Resuscitate)  Medical Interventions (Patient has pulse or is breathing): Full Support        Electronically signed by Olayinka Maya MD, 02/06/22, 10:35 AM EST.

## 2022-02-06 NOTE — PROGRESS NOTES
Pulmonary / Critical Care Progress Note      Patient Name: Tres Jackson  : 1940  MRN: [id number]  Primary Care Physician:  Erick Pedroza MD  Date of admission: 2022    Subjective   Subjective   Follow-up for large right pleural effusion, acute hypoxic respiratory failure.    Over past 24 hours, weaned oxygen.  Remained on diuretics.  Remains on antibiotics as well.  Tracheostomy care per respiratory therapy.    No acute events overnight.     This morning,   Sitting on side of bed, no acute distress  Bedside pulse ox shows 98%, on trach collar 40% fi02  X ray shows some improvement  Non productive cough, feels better Dyspnea improved  No chest pain  No fever or chills  Weak and fatigued    Review of Systems  General:  No Fatigue, No Fever.   HEENT: No dysphagia, No Visual Changes, no rhinorrhea  Respiratory:  +  Nonproductive cough, + Dyspnea (improving), Clear phlegm, No Pleuritic Pain, No wheezing, no hemoptysis  Cardiovascular: Denies chest pain, denies palpitations, + MAXWELL, No Chest Pressure, +  orthopnea  Gastrointestinal:  No Abdominal Pain, No Nausea, No Vomiting, No Diarrhea  Genitourinary:  No Dysuria, No Frequency, No Hesitancy  Musculoskeletal: No muscle pain or swelling    Objective   Objective     Vitals:   Temp:  [97.3 °F (36.3 °C)-98.2 °F (36.8 °C)] 98.1 °F (36.7 °C)  Heart Rate:  [64-80] 70  Resp:  [16-18] 18  BP: ()/(50-87) 132/87  Flow (L/min):  [9-10] 9     Physical Exam   Vital Signs Reviewed   General: WDWN male, Awake and Alert, NAD on trach collar 6 L     HEENT:  PERRL, EOMI.  OP, nares clear, no sinus tenderness, trach stoma without redness  Neck:  Supple, no JVD, no thyromegaly  Lymph: no axillary, cervical, supraclavicular lymphadenopathy noted bilaterally  Chest: good aeration, diminished bilaterally, tympanic to percussion, no work of breathing noted  CV: RRR, no MGR, pulses 2+, equal  Abd:  Soft, NT, ND, + BS, no HSM  EXT:  no clubbing, no cyanosis, no edema, no  joint tenderness  Neuro:  A&Ox3, CN grossly intact, no focal deficits  Skin: No rashes or lesions noted    Result Review    Result Review:  I have personally reviewed the results from the time of this admission to 2/6/2022 12:59 EST and agree with these findings:  [x]  Laboratory  [x]  Microbiology  [x]  Radiology  [x]  EKG/Telemetry   []  Cardiology/Vascular   []  Pathology  []  Old records  []  Other:  Most notable findings include:   Serum creatinine 1.36  Serum sodium 139    2/2 CXR improved right pleural effusion    Assessment/Plan   Assessment / Plan     Active Hospital Problems:  Active Hospital Problems    Diagnosis    • Severe malnutrition (CMS/HCC)    • Acute on chronic respiratory failure with hypoxia (HCC)      Impression:   Large right pleural effusion: s/p thoracentesis, transudative  Acute hypoxic respiratory failure  Acute on chronic diastolic heart failure: proBNP 16,990  CKD stage III  Concern for community acquired pneumonia of unspecified   HTN  DM 2  Afib: non-compliant with Eliquis  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  Appears to be transudative effusion.  Hold  diuresis due to contraction and no significant O2 need will probably need some daily diuresis  Resume daily PO lasix 2/8/2021 due to renal failure.    Trend renal panel and electrolytes.  Continue O2 to keep sats >90%.  Does not use O2 at home.   Wean Oxygen to tolerate SPO2 90-94%  Procal 0.12 cultures neg. Off ABX 1 day  Continue bronchopulmonary hygiene.  Continue Eliquis.  Encourage activity. Up to chair as tolerated.     DVT prophylaxis:  Medical and mechanical DVT prophylaxis orders are present.    CODE STATUS:   Level Of Support Discussed With: Patient  Code Status (Patient has no pulse and is not breathing): CPR (Attempt to Resuscitate)  Medical Interventions (Patient has pulse or is breathing): Full  Support    I personally reviewed pertinent labs, imaging and provider notes.   Discussed with bedside nurse and will discuss with primary service.     Electronically signed by Lyle Duffy MD, 02/07/22, 7:45 AM EST.

## 2022-02-06 NOTE — PLAN OF CARE
Goal Outcome Evaluation:  Plan of Care Reviewed With: patient remains in controlled atrial fib. Denies pain or discomfort.

## 2022-02-07 NOTE — PROGRESS NOTES
Patient Name: Tres Jackson Date of Admission: 2022   : 1940 MRN: [id number]   Location: [place] [address] 258/1 CSN: [id number]       [facility]   Follow Up  DATE: 2022  Primary Care Provider  Erick Pedroza MD    Subjective   Subjective     Subjective  Patient seen on rounds this morning, he had already had his thoracentesis, was breathing easier.  He had no new complaints.  I discussed his case with Dr. Damon, and I called his wife later in the evening...Patient seen on rounds this morning 2/3, he was sit on the side of bed in no distress.  His voice is weaker, its harder for him to communicate, but he nodded appropriately.  Chart reviewed to include consultants note...Patient seen on rounds this early afternoon, he was up on the side the bed where he usually is, in no distress.  The trach collar is hanging a little low, not sure how much he is benefiting from this, but his sats were holding in the low 90s at least, so this is much better than when admitted.  He had no new concerns, just not happy being in the hospital in general.---> Patient seen on rounds this morning 2/7, he was in bed, no distress.  I reviewed his labs and O2 sats with him.  Said he was eating well.  Await pulmonary eval today in regards to disposition, but it looks like he is nearing discharge.    Objective   Objective     ROS    Review of Systems   Constitutional: Positive for appetite change and fatigue. Negative for fever.   HENT: Positive for congestion. Negative for dental problem, ear pain and trouble swallowing.    Eyes: Negative for pain and visual disturbance.   Respiratory: Positive for shortness of breath.         Less cough, denies dyspnea at rest at least.   Cardiovascular: Negative for chest pain.   Gastrointestinal: Negative for abdominal pain, diarrhea and vomiting.   Endocrine: Negative for cold intolerance and heat intolerance.   Genitourinary: Negative for dysuria, flank pain and hematuria.    Musculoskeletal: Positive for arthralgias. Negative for gait problem and neck pain.   Skin: Negative for rash and wound.   Allergic/Immunologic: Negative for immunocompromised state.   Neurological: Positive for weakness. Negative for dizziness, syncope and headaches.   Psychiatric/Behavioral: Negative for agitation, confusion, self-injury and suicidal ideas.       Exam    Vitals Signs    Temp:  [97.2 °F (36.2 °C)-97.9 °F (36.6 °C)] 97.5 °F (36.4 °C)  Heart Rate:  [63-82] 70  Resp:  [18-19] 18  BP: (100-140)/(52-86) 103/73  Flow (L/min):  [6-9] 6    Intake & Output (last 3 days)       02/04 0701  02/05 0700 02/05 0701  02/06 0700 02/06 0701  02/07 0700 02/07 0701  02/08 0700    P.O. 840 540 660 550    IV Piggyback  50      Total Intake(mL/kg) 840 (12) 590 (8.2) 660 (9.1) 550 (7.6)    Urine (mL/kg/hr) 850 (0.5) 1775 (1) 575 (0.3) 600 (0.7)    Total Output 850 1775 575 600    Net -10 -1185 +85 -50                   Physical Exam  Constitutional:       General: He is not in acute distress.     Appearance: Normal appearance. He is not toxic-appearing.   HENT:      Head: Atraumatic.      Right Ear: External ear normal.      Left Ear: External ear normal.      Nose: Nose normal.      Mouth/Throat:      Mouth: Mucous membranes are moist.   Eyes:      General:         Right eye: No discharge.         Left eye: No discharge.      Extraocular Movements: Extraocular movements intact.      Pupils: Pupils are equal, round, and reactive to light.   Cardiovascular:      Rate and Rhythm: Normal rate. Rhythm irregular.      Pulses: Normal pulses.      Heart sounds: Normal heart sounds. No murmur heard.  No gallop.    Pulmonary:      Effort: Pulmonary effort is normal. No respiratory distress.      Breath sounds: Rhonchi and rales present. No wheezing.      Comments: Improved breath sounds on the right that is post thoracentesis.---> Stable breath sounds 2/7.  Abdominal:      General: There is no distension.      Palpations:  Abdomen is soft. There is no mass.      Tenderness: There is no abdominal tenderness. There is no guarding.   Musculoskeletal:         General: No swelling or tenderness.      Cervical back: No tenderness.      Right lower leg: Edema present.      Left lower leg: Edema present.      Comments: Trace edema 22/7.   Skin:     General: Skin is warm and dry.      Findings: No rash.   Neurological:      General: No focal deficit present.      Mental Status: He is alert and oriented to person, place, and time. Mental status is at baseline.      Motor: Weakness present.      Gait: Gait abnormal.   Psychiatric:         Mood and Affect: Mood normal.         Thought Content: Thought content normal.         Labs:        Lab 02/07/22  0441 02/06/22 0413   WBC 6.02 5.68   HEMOGLOBIN 15.0 15.0   HEMATOCRIT 44.2 44.6   PLATELETS 132* 137*           Lab 02/07/22  0441 02/06/22 0413   SODIUM 136 136   POTASSIUM 4.7 4.3   CHLORIDE 94* 94*   CO2 30.4* 30.0*   BUN 31* 29*       Assessment / Plan     Current Medications    Scheduled Meds:allopurinol, 100 mg, Oral, Daily  amLODIPine, 2.5 mg, Oral, Q24H  apixaban, 5 mg, Oral, Q12H  carvedilol, 12.5 mg, Oral, BID With Meals  famotidine, 20 mg, Oral, Nightly  furosemide, 60 mg, Intravenous, BID  levothyroxine, 100 mcg, Oral, Daily  lisinopril, 20 mg, Oral, Q12H  [START ON 2/8/2022] potassium chloride, 20 mEq, Oral, BID With Meals  sodium chloride, 10 mL, Intravenous, Q12H      Continuous Infusions:   PRN Meds:.•  acetaminophen **OR** acetaminophen **OR** acetaminophen  •  aluminum-magnesium hydroxide-simethicone  •  senna-docusate sodium **AND** polyethylene glycol **AND** bisacodyl **AND** bisacodyl  •  calcium carbonate  •  melatonin  •  nitroglycerin  •  ondansetron  •  sodium chloride     Impression    1. A/C Diastolic HF: As noted, this appears to be more so related to the patient noncompliance with the diuretics as to any acute coronary insult.  Patient without any ischemic type chest  pains, enzymes are negative.  His BNP is up from around 8000 to 17,000 at the time of admission. We will continue with IV diuretics and follow-up labs daily...GFR is still much better than baseline, will continue with current diuretics.  Needs a little potassium.  His wife is request that I consult her cardiologist, Dr. Guzman... patient has still not bumped his renal function yet despite a significant drop in his BNP from 17 K to 5.6K, so continue same diuretics as of 2/4---> volume status is continuing to improve as of 2/7, he is just now starting to get dried out, likely discontinue IV diuretics tomorrow.     2. Persistent AFIB: Patient was switched from atenolol to Coreg given the above. We will likely titrate dose shortly given his persistent hypertension. He had been noncompliant with his Eliquis as well, we gave him a dose this morning, but I am holding that going forward given his need for thoracentesis...resumed Eliquis yesterday evening 2/2 and rate is well controlled---> ditto as of 2/7, will discontinue telemetry at this time.       3. A/C Hypoxic Resp Failure: Appears multi factorial. Secondary to the CHF above as well as the pleural effusion noted below. He was on no home O2, currently requiring somewhere between 30 to 40% trach collar. Hopefully thoracentesis will significantly improve his oxygenation as well as continued diuresis and empiric antibiotics...still requiring 35-40% trach collar 24/7 as of 2/4, and this is disappointing since I was hoping to wean him to nocturnal oxygen only---> he is down to 30% as of 2/7, and I guess this is the level he will need to go home on.  We will review this with pulmonology tomorrow.    4. R Pleural Effusion: Was confirmed on chest CT today and pulmonary has been notified. As noted he only received 1 dose of Eliquis earlier this morning, it since been discontinued---> s/p tap 2/2 and had 1.9 L removed; it was negative for any obvious infection and for  malignancy.     5. Possible CAP: Rocephin/Zithromax completed x 7; cultures remain negative--->no temps as of 2/7.     6. Hypertension. This is curiously uncontrolled at present, despite several doses of his routine meds. Have room to advance carvedilol, but he was just started on this yesterday. Will give him a few doses of amlodipine, and certainly back off on this if blood pressure improves with further diuresis...BP much improved today 2/2, continue current dosing...blood pressure is too well controlled almost as of 2/3, will review his meds = ditto 2/4, no room to titrate beta-blocker and will defer Entresto to cardiology as outpatient---> ditto as of 2/7.    7. CKD 3b. GFR of 48 on admission is actually better than his baseline, and may be related to volume overload. He typically has his labs done at an outside facility...GFR still above normal as of 2/4, so continuing diuresis as noted above, monitoring labs daily---> GFR just dropped from 54 to 44 as of 2/7.  Likely discontinue IV Lasix after review a.m. labs 2/8.     8. Persistent Nausea/Anorexia: This appears to be improving already with treatment of his above conditions.  May be this is related to significant hypoxia...Since resolved.     9. Gait Dysfunction/Recurrent Falls: OT/PT have been consulted. Hopefully will be steadier on his feet with improved oxygenation. He has fallen several times at home...OT/PT saw him on admission, and released him to the [facility] care.     10. DVT Prophylaxis: Via mechanical means while we are awaiting thoracentesis.    11.  PUD prophylaxis.  Via H2 blocker.    Problem List    Active Problems:    Acute on chronic respiratory failure with hypoxia (HCC)    Severe malnutrition (CMS/HCC)      Plan:  as above      Electronically signed by Erick Pedroza MD, 2/7/2022, 18:59 EST.

## 2022-02-07 NOTE — PLAN OF CARE
Goal Outcome Evaluation:  Plan of Care Reviewed With: patient         No new complaints will continue to monitor

## 2022-02-08 NOTE — PLAN OF CARE
Goal Outcome Evaluation:      No change in patient status. No complaints from patient. Continue to monitor. Lisa Gross RN

## 2022-02-08 NOTE — NURSING NOTE
Exercise Oximetry    Patient Name:Tres Jackson   MRN: [id number]   Date: 02/08/22             ROOM AIR BASELINE   SpO2% 89%   Heart Rate    Blood Pressure      EXERCISE ON ROOM AIR SpO2% EXERCISE ON O2 @  3 LPM SpO2%   1 MINUTE 82% 1 MINUTE 93%   2 MINUTES n/a 2 MINUTES 93%   3 MINUTES n/a 3 MINUTES 94%   4 MINUTES n/a 4 MINUTES 94%   5 MINUTES n/a 5 MINUTES 94%   6 MINUTES n/a 6 MINUTES 94%              Distance Walked  0 Distance Walked  150 ft   Dyspnea (Gifty Scale)   Dyspnea (Gifty Scale)   Fatigue (Gifty Scale)   Fatigue (Gifty Scale)   SpO2% Post Exercise   SpO2% Post Exercise   HR Post Exercise   HR Post Exercise   Time to Recovery   Time to Recovery     Comments: 31% fio2 venturi mask to trach collar with laryngectomy.

## 2022-02-08 NOTE — PLAN OF CARE
Goal Outcome Evaluation:    RD follow-up for 8 day LOS:  Pt now eating 100% all meals.  In talking with pt since they have pulled the fluid off with thoracentesis & diuretic feels vinh much better, therefore is appetite has improved.  7# wt change because of diuresis.  Pt states the Ensure Enlive is moise but doesn't want dietary to stop sending.  Encouraged pt to take any unopened containers home with him, currently 1 sitting on his bedside table.  Labs reviewed.  No new RD interventions at this time.

## 2022-02-08 NOTE — CASE MANAGEMENT/SOCIAL WORK
RT CM contacted by RN DERIAN concerning pt's possible home O2 needs.  Pt had a laryngectomy in 2008 and breathes through his stoma.  Pt has been using 28% aerosol trach collar while resting in bed.   RT CM assisted CNA with walk test to assess supplemental oxygen needs with activity.   Pt was placed on room air and SpO2 dropped to 86% before ambulation even began.  Pt was placed on 28% via venturi device and assisted to standing position but still was unable to maintain acceptable saturation, Spo2 dropped to 88%.   Oxygen was increased to 31% (6L) via venturi device on trach collar to maintain SpO2 above 89% throughout the remainder of the walk.

## 2022-02-08 NOTE — CASE MANAGEMENT/SOCIAL WORK
Patient is currently on 6 L trach collar, patient does not have Home O2. Orders to titrate are active.    Patient underwent Thoracentesis on 2/2/22 1900cc removed from right side.     Patient has been placed on Eliquis, new medication for Atrial fibrillation    Per MD note patient wife requested to see Dr Guzman, no consult noted at this time- sent secure message to MD.     Order placed for overnight study if patient fails walk test, voalted RT CM to make sure she was aware? Unsure of course of action at this time.     Last documented BM 2/6/22    PT/OT recommend home with assist/ home health.    CM and SW to follow.

## 2022-02-08 NOTE — PROGRESS NOTES
Pulmonary / Critical Care Progress Note      Patient Name: Tres Jackson  : 1940  MRN: [id number]  Primary Care Physician:  Erick Pedroza MD  Date of admission: 2022    Subjective   Subjective   Follow-up for large right pleural effusion, acute hypoxic respiratory failure.    Over past 24 hours, weaned oxygen.  Remained on diuretics.  Remains on antibiotics as well.  Tracheostomy care per respiratory therapy.    No acute events overnight.     This morning,   Sitting on side of bed, no acute distress  Bedside pulse ox shows 98%, on trach collar 40% fi02  X ray shows some improvement  Non productive cough, feels better Dyspnea improved  No chest pain  No fever or chills  Weak and fatigued    Review of Systems  General:  No Fatigue, No Fever.   HEENT: No dysphagia, No Visual Changes, no rhinorrhea  Respiratory:  +  Nonproductive cough, + Dyspnea (improving), Clear phlegm, No Pleuritic Pain, No wheezing, no hemoptysis  Cardiovascular: Denies chest pain, denies palpitations, + MAXWELL, No Chest Pressure, +  orthopnea  Gastrointestinal:  No Abdominal Pain, No Nausea, No Vomiting, No Diarrhea  Genitourinary:  No Dysuria, No Frequency, No Hesitancy  Musculoskeletal: No muscle pain or swelling    Objective   Objective     Vitals:   Temp:  [97.2 °F (36.2 °C)-97.9 °F (36.6 °C)] 97.5 °F (36.4 °C)  Heart Rate:  [63-82] 70  Resp:  [18-19] 18  BP: (103-140)/(65-86) 103/73  Flow (L/min):  [6-9] 6     Physical Exam   Vital Signs Reviewed   General: WDWN male, Awake and Alert, NAD on trach collar 6 L     HEENT:  PERRL, EOMI.  OP, nares clear, no sinus tenderness, trach stoma without redness  Neck:  Supple, no JVD, no thyromegaly  Lymph: no axillary, cervical, supraclavicular lymphadenopathy noted bilaterally  Chest: good aeration, diminished bilaterally, tympanic to percussion, no work of breathing noted  CV: RRR, no MGR, pulses 2+, equal  Abd:  Soft, NT, ND, + BS, no HSM  EXT:  no clubbing, no cyanosis, no edema, no  joint tenderness  Neuro:  A&Ox3, CN grossly intact, no focal deficits  Skin: No rashes or lesions noted    Result Review    Result Review:  I have personally reviewed the results from the time of this admission to 2/7/2022 20:16 EST and agree with these findings:  [x]  Laboratory  [x]  Microbiology  [x]  Radiology  [x]  EKG/Telemetry   []  Cardiology/Vascular   []  Pathology  []  Old records  []  Other:  Most notable findings include:   Serum creatinine 1.36  Serum sodium 139    2/2 CXR improved right pleural effusion    Assessment/Plan   Assessment / Plan     Active Hospital Problems:  Active Hospital Problems    Diagnosis    • Severe malnutrition (CMS/HCC)    • Acute on chronic respiratory failure with hypoxia (HCC)      Impression:   Large right pleural effusion: s/p thoracentesis, transudative  Acute hypoxic respiratory failure  Acute on chronic diastolic heart failure: proBNP 16,990  CKD stage III  Concern for community acquired pneumonia of unspecified   HTN  DM 2  Afib: non-compliant with Eliquis  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  Appears to be transudative effusion.  Hold  diuresis due to contraction and no significant O2 need will probably need some daily diuresis  Resume daily PO lasix 2/8/2021 due to renal failure.    Trend renal panel and electrolytes.  Continue O2 to keep sats >90%.  Does not use O2 at home.   Wean Oxygen to tolerate SPO2 90-94%  Procal 0.12 cultures neg. Off ABX 1 day  Continue bronchopulmonary hygiene.  Continue Eliquis.  Encourage activity. Up to chair as tolerated.     DVT prophylaxis:  Medical and mechanical DVT prophylaxis orders are present.    CODE STATUS:   Level Of Support Discussed With: Patient  Code Status (Patient has no pulse and is not breathing): CPR (Attempt to Resuscitate)  Medical Interventions (Patient has pulse or is breathing): Full  Support    I personally reviewed pertinent labs, imaging and provider notes.   Discussed with bedside nurse and will discuss with primary service.     Electronically signed by Lyle Duffy MD, 02/07/22, 7:45 AM EST.

## 2022-02-09 NOTE — OUTREACH NOTE
Prep Survey      Responses   [facility] patient discharged from? [facility]   Is LACE score < 7 ? No   Emergency Room discharge w/ pulse ox? No   Eligibility [facility]   Date of Admission 01/31/22   Date of Discharge 02/09/22   Discharge Disposition Home or Self Care   Discharge diagnosis A/C Diastolic HF,    Persistent AFIB   Does the patient have one of the following disease processes/diagnoses(primary or secondary)? CHF   Does the patient have Home health ordered? Yes   What is the Home health agency?  [facility]    Is there a DME ordered? No   Prep survey completed? Yes          Disha Snell RN

## 2022-02-09 NOTE — PROGRESS NOTES
Patient Name: Tres Jackson Date of Admission: 2022   : 1940 MRN: [id number]   Location: [place] [address] 258/1 CSN: [id number]       [facility]   Follow Up  DATE: 2022  Primary Care Provider  Erick Pedroza MD    Subjective   Subjective     Subjective  Patient seen on rounds this morning, he had already had his thoracentesis, was breathing easier.  He had no new complaints.  I discussed his case with Dr. Damon, and I called his wife later in the evening...Patient seen on rounds this morning 2/3, he was sit on the side of bed in no distress.  His voice is weaker, its harder for him to communicate, but he nodded appropriately.  Chart reviewed to include consultants note...Patient seen on rounds this early afternoon, he was up on the side the bed where he usually is, in no distress.  The trach collar is hanging a little low, not sure how much he is benefiting from this, but his sats were holding in the low 90s at least, so this is much better than when admitted.  He had no new concerns, just not happy being in the hospital in general.---> Patient seen on rounds again this morning , he was in bed, no distress.  I reviewed his labs and O2 sats with him.  Said he was eating well.  Await pulmonary eval today in regards to disposition, but it looks like he is nearing discharge.  I discussed with him the need to do a 6-minute walk test today.    Objective   Objective     ROS    Review of Systems   Constitutional: Positive for appetite change and fatigue. Negative for fever.   HENT: Positive for congestion. Negative for dental problem, ear pain and trouble swallowing.    Eyes: Negative for pain and visual disturbance.   Respiratory:        Denies any shortness of breath, but he is pretty stoic about those kind of things.   Cardiovascular: Negative for chest pain.   Gastrointestinal: Negative for abdominal pain, diarrhea and vomiting.   Endocrine: Negative for cold intolerance and heat intolerance.    Genitourinary: Negative for dysuria, flank pain and hematuria.   Musculoskeletal: Positive for arthralgias. Negative for gait problem and neck pain.   Skin: Negative for rash and wound.   Allergic/Immunologic: Negative for immunocompromised state.   Neurological: Positive for weakness. Negative for dizziness, syncope and headaches.   Psychiatric/Behavioral: Negative for agitation, confusion, self-injury and suicidal ideas.       Exam    Vitals Signs    Temp:  [97.4 °F (36.3 °C)-98.2 °F (36.8 °C)] 97.4 °F (36.3 °C)  Heart Rate:  [67-87] 86  Resp:  [16-18] 16  BP: ()/(57-86) 100/61  Flow (L/min):  [6] 6    Intake & Output (last 3 days)       02/06 0701  02/07 0700 02/07 0701  02/08 0700 02/08 0701  02/09 0700    P.O. 660 550 220    IV Piggyback       Total Intake(mL/kg) 660 (9.1) 550 (7.6) 220 (3)    Urine (mL/kg/hr) 575 (0.3) 1400 (0.8)     Total Output 575 1400     Net +85 -850 +220                  Physical Exam  Constitutional:       General: He is not in acute distress.     Appearance: Normal appearance. He is not toxic-appearing.   HENT:      Head: Atraumatic.      Right Ear: External ear normal.      Left Ear: External ear normal.      Nose: Nose normal.      Mouth/Throat:      Mouth: Mucous membranes are moist.   Eyes:      General:         Right eye: No discharge.         Left eye: No discharge.      Extraocular Movements: Extraocular movements intact.      Pupils: Pupils are equal, round, and reactive to light.   Cardiovascular:      Rate and Rhythm: Normal rate. Rhythm irregular.      Pulses: Normal pulses.      Heart sounds: Normal heart sounds. No murmur heard.  No gallop.    Pulmonary:      Effort: Pulmonary effort is normal. No respiratory distress.      Breath sounds: Rhonchi and rales present. No wheezing.      Comments: Improved breath sounds on the right that is post thoracentesis.---> Stable breath sounds 2/8.  Abdominal:      General: There is no distension.      Palpations: Abdomen is  soft. There is no mass.      Tenderness: There is no abdominal tenderness. There is no guarding.   Musculoskeletal:         General: No swelling or tenderness.      Cervical back: No tenderness.      Right lower leg: Edema present.      Left lower leg: Edema present.      Comments: Edema all but resolved as of 2/8.   Skin:     General: Skin is warm and dry.      Findings: No rash.   Neurological:      General: No focal deficit present.      Mental Status: He is alert and oriented to person, place, and time. Mental status is at baseline.      Motor: Weakness present.      Gait: Gait abnormal.      Comments: Strength is improving as of 2/8.   Psychiatric:         Mood and Affect: Mood normal.         Thought Content: Thought content normal.         Labs:        Lab 02/07/22  0441 02/06/22  0413   WBC 6.02 5.68   HEMOGLOBIN 15.0 15.0   HEMATOCRIT 44.2 44.6   PLATELETS 132* 137*           Lab 02/08/22  0911 02/07/22  0441   SODIUM 133* 136   POTASSIUM 4.7 4.7   CHLORIDE 92* 94*   CO2 32.1* 30.4*   BUN 31* 31*       Assessment / Plan     Current Medications    Scheduled Meds:allopurinol, 100 mg, Oral, Daily  amLODIPine, 2.5 mg, Oral, Q24H  apixaban, 5 mg, Oral, Q12H  carvedilol, 12.5 mg, Oral, BID With Meals  famotidine, 20 mg, Oral, Nightly  furosemide, 40 mg, Oral, Daily  levothyroxine, 100 mcg, Oral, QAM AC  lisinopril, 20 mg, Oral, Q12H  potassium chloride, 20 mEq, Oral, Daily  sodium chloride, 10 mL, Intravenous, Q12H      Continuous Infusions:   PRN Meds:.•  acetaminophen **OR** acetaminophen **OR** acetaminophen  •  aluminum-magnesium hydroxide-simethicone  •  senna-docusate sodium **AND** polyethylene glycol **AND** bisacodyl **AND** bisacodyl  •  calcium carbonate  •  melatonin  •  nitroglycerin  •  ondansetron  •  sodium chloride     Impression    1. A/C Diastolic HF: As noted, this appears to be more so related to the patient noncompliance with the diuretics as to any acute coronary insult.  Patient without  any ischemic type chest pains, enzymes are negative.  His BNP is up from around 8000 to 17,000 at the time of admission. We will continue with IV diuretics and follow-up labs daily...GFR is still much better than baseline, will continue with current diuretics.  Needs a little potassium.  His wife is request that I consult her cardiologist, Dr. Guzman... patient has still not bumped his renal function yet despite a significant drop in his BNP from 17 K to 5.6K, so continue same diuretics as of 2/4---> volume status is continuing to improve as of 2/8, he was switched to oral diuretics, and that is appropriate.    2. Persistent AFIB: Patient was switched from atenolol to Coreg given the above. We will likely titrate dose shortly given his persistent hypertension. He had been noncompliant with his Eliquis as well, we gave him a dose this morning, but I am holding that going forward given his need for thoracentesis...resumed Eliquis yesterday evening 2/2 and rate is well controlled... ditto as of 2/7, will discontinue telemetry at this time.       3. A/C Hypoxic Resp Failure: Appears multi factorial. Secondary to the CHF above as well as the pleural effusion noted below. He was on no home O2, currently requiring somewhere between 30 to 40% trach collar. Hopefully thoracentesis will significantly improve his oxygenation as well as continued diuresis and empiric antibiotics...still requiring 35-40% trach collar 24/7 as of 2/4, and this is disappointing since I was hoping to wean him to nocturnal oxygen only...he is down to 30% as of 2/7, and I guess this is the level he will need to go home on.  We will review this with pulmonology tomorrow.---> 6-minute walk test was ordered today 2/8:    Pt had a laryngectomy in 2008 and breathes through his stoma.  Pt has been using 28% aerosol trach collar while resting in bed.   RT CM assisted CNA with walk test to assess supplemental oxygen needs with activity.   Pt was placed on  room air and SpO2 dropped to 86% before ambulation even began.  Pt was placed on 28% via venturi device and assisted to standing position but still was unable to maintain acceptable saturation, Spo2 dropped to 88%.   Oxygen was increased to 31% (6L) via venturi device on trach collar to maintain SpO2 above 89% throughout the remainder of the walk.     4. R Pleural Effusion: Was confirmed on chest CT today and pulmonary has been notified. As noted he only received 1 dose of Eliquis earlier this morning, it since been discontinued---> s/p tap 2/2 and had 1.9 L removed; it was negative for any obvious infection and for malignancy.     5. Possible CAP: Rocephin/Zithromax completed x 7; cultures remain negative--->no temps as of 2/8.     6. Hypertension. This is curiously uncontrolled at present, despite several doses of his routine meds. Have room to advance carvedilol, but he was just started on this yesterday. Will give him a few doses of amlodipine, and certainly back off on this if blood pressure improves with further diuresis...BP much improved today 2/2, continue current dosing...blood pressure is too well controlled almost as of 2/3, will review his meds = ditto 2/4, no room to titrate beta-blocker and will defer Entresto to cardiology as outpatient---> ditto as of 2/8.    7. CKD 3b. GFR of 48 on admission is actually better than his baseline, and may be related to volume overload. He typically has his labs done at an outside facility...GFR still above normal as of 2/4, so continuing diuresis as noted above, monitoring labs daily...GFR just dropped from 54 to 44 as of 2/7.  Likely discontinue IV Lasix after review a.m. labs 2/8.---> 44 is holding as of 2/8, follow-up in a.m. on oral diuretics now.     8. Persistent Nausea/Anorexia: This appears to be improving already with treatment of his above conditions.  May be this is related to significant hypoxia...Since resolved.     9. Gait Dysfunction/Recurrent Falls:  OT/PT have been consulted. Hopefully will be steadier on his feet with improved oxygenation. He has fallen several times at home...OT/PT saw him on admission, and released him to the [facility] care.     10. DVT Prophylaxis: Via mechanical means while we are awaiting thoracentesis.    11.  PUD prophylaxis.  Via H2 blocker.    Problem List    Active Problems:    Acute on chronic respiratory failure with hypoxia (HCC)    Severe malnutrition (CMS/HCC)      Plan:  as above      Electronically signed by Erick Pedroza MD, 2/8/2022, 19:25 EST.

## 2022-02-09 NOTE — PROGRESS NOTES
Pulmonary / Critical Care Progress Note      Patient Name: Tres Jackson  : 1940  MRN: [id number]  Primary Care Physician:  rEick Pedroza MD  Date of admission: 2022    Subjective   Subjective   Follow-up for large right pleural effusion, acute hypoxic respiratory failure.    Over past 24 hours, weaned oxygen.  Remained on diuretics.  Remains on antibiotics as well.  Tracheostomy care per respiratory therapy.  Overnight oximetry showed potentially significant desaturations.  This morning,   Sitting on side of bed, no acute distress    Bedside POCUS shows moderate Pleural  effusion  But feels better Dyspnea improved.  No chest pain  No fever or chills  Weak and fatigued    Review of Systems  General:  No Fatigue, No Fever.   HEENT: No dysphagia, No Visual Changes, no rhinorrhea  Respiratory:  +  Nonproductive cough, + Dyspnea (improving), Clear phlegm, No Pleuritic Pain, No wheezing, no hemoptysis  Cardiovascular: Denies chest pain, denies palpitations, + MAXWELL, No Chest Pressure, +  orthopnea  Gastrointestinal:  No Abdominal Pain, No Nausea, No Vomiting, No Diarrhea  Genitourinary:  No Dysuria, No Frequency, No Hesitancy  Musculoskeletal: No muscle pain or swelling    Objective   Objective     Vitals:   Temp:  [97.2 °F (36.2 °C)-98.1 °F (36.7 °C)] 98.1 °F (36.7 °C)  Heart Rate:  [56-86] 84  Resp:  [16-18] 16  BP: (100-117)/(61-83) 117/72  Flow (L/min):  [6] 6     Physical Exam   Vital Signs Reviewed   General: WDWN male, Awake and Alert, NAD on trach collar 6 L     HEENT:  PERRL, EOMI.  OP, nares clear, no sinus tenderness, trach stoma without redness  Neck:  Supple, no JVD, no thyromegaly  Lymph: no axillary, cervical, supraclavicular lymphadenopathy noted bilaterally  Chest: good aeration, diminished bilaterally more on right, tympanic to percussion, no work of breathing noted  CV: RRR, no MGR, pulses 2+, equal  Abd:  Soft, NT, ND, + BS, no HSM  EXT:  no clubbing, no cyanosis, no edema, no joint  tenderness  Neuro:  A&Ox3, CN grossly intact, no focal deficits  Skin: No rashes or lesions noted    Result Review    Result Review:  I have personally reviewed the results from the time of this admission to 2/9/2022 15:08 EST and agree with these findings:  [x]  Laboratory  [x]  Microbiology  [x]  Radiology  [x]  EKG/Telemetry   []  Cardiology/Vascular   []  Pathology  []  Old records  []  Other:  Most notable findings include:   Serum creatinine 1.36  Serum sodium 139    2/2 CXR improved right pleural effusion    Assessment/Plan   Assessment / Plan     Active Hospital Problems:  Active Hospital Problems    Diagnosis    • Severe malnutrition (CMS/HCC)    • Acute on chronic respiratory failure with hypoxia (HCC)      Impression:   Large right pleural effusion: s/p thoracentesis, transudative  Acute hypoxic respiratory failure  Acute on chronic diastolic heart failure: proBNP 16,990  CKD stage III  Concern for community acquired pneumonia of unspecified   HTN  DM 2  Afib: non-compliant with Eliquis  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  It is difficult to stick a needle into someone who is not in any distress. But his kidneys are not providing adequate drainage.     Resumed daily PO lasix 2/8/2021   Trend renal panel and electrolytes probably can scale back to every other day soon.     Continue O2 to keep sats >90%.  Does not use O2 at home.   Wean Oxygen to tolerate SPO2 90-94%  Off ABX 3 days afebrile doing ok.  Continue bronchopulmonary hygiene.  Continue Eliquis.  Encourage activity. Up to chair as tolerated.         DVT prophylaxis:  Medical and mechanical DVT prophylaxis orders are present.    CODE STATUS:   Level Of Support Discussed With: Patient  Code Status (Patient has no pulse and is not breathing): CPR (Attempt to Resuscitate)  Medical  Interventions (Patient has pulse or is breathing): Full Support    I personally reviewed pertinent labs, imaging and provider notes.   Discussed with bedside nurse and will discuss with primary service.     Electronically signed by Lyle Duffy MD, 02/08/22, 8:19 PM EST.

## 2022-02-09 NOTE — PROGRESS NOTES
Pulmonary / Critical Care Progress Note      Patient Name: Tres Jackson  : 1940  MRN: [id number]  Primary Care Physician:  Erick Pedroza MD  Date of admission: 2022    Subjective   Subjective   Follow-up for large right pleural effusion, acute hypoxic respiratory failure.    Over past 24 hours, weaned oxygen.  Remained on diuretics.  Remains on antibiotics as well.  Tracheostomy care per respiratory therapy.    No acute events overnight.     This morning,   Sitting on side of bed, no acute distress  Bedside pulse ox shows 98%, on trach collar 40% fi02  X ray shows some improvement  Non productive cough, feels better Dyspnea improved  No chest pain  No fever or chills  Weak and fatigued    Review of Systems  General:  No Fatigue, No Fever.   HEENT: No dysphagia, No Visual Changes, no rhinorrhea  Respiratory:  +  Nonproductive cough, + Dyspnea (improving), Clear phlegm, No Pleuritic Pain, No wheezing, no hemoptysis  Cardiovascular: Denies chest pain, denies palpitations, + MAXWELL, No Chest Pressure, +  orthopnea  Gastrointestinal:  No Abdominal Pain, No Nausea, No Vomiting, No Diarrhea  Genitourinary:  No Dysuria, No Frequency, No Hesitancy  Musculoskeletal: No muscle pain or swelling    Objective   Objective     Vitals:   Temp:  [97.4 °F (36.3 °C)-98.2 °F (36.8 °C)] 97.4 °F (36.3 °C)  Heart Rate:  [67-87] 86  Resp:  [16-18] 16  BP: ()/(57-86) 100/61  Flow (L/min):  [6] 6     Physical Exam   Vital Signs Reviewed   General: WDWN male, Awake and Alert, NAD on trach collar 6 L     HEENT:  PERRL, EOMI.  OP, nares clear, no sinus tenderness, trach stoma without redness  Neck:  Supple, no JVD, no thyromegaly  Lymph: no axillary, cervical, supraclavicular lymphadenopathy noted bilaterally  Chest: good aeration, diminished bilaterally, tympanic to percussion, no work of breathing noted  CV: RRR, no MGR, pulses 2+, equal  Abd:  Soft, NT, ND, + BS, no HSM  EXT:  no clubbing, no cyanosis, no edema, no  joint tenderness  Neuro:  A&Ox3, CN grossly intact, no focal deficits  Skin: No rashes or lesions noted    Result Review    Result Review:  I have personally reviewed the results from the time of this admission to 2/8/2022 20:19 EST and agree with these findings:  [x]  Laboratory  [x]  Microbiology  [x]  Radiology  [x]  EKG/Telemetry   []  Cardiology/Vascular   []  Pathology  []  Old records  []  Other:  Most notable findings include:   Serum creatinine 1.36  Serum sodium 139    2/2 CXR improved right pleural effusion    Assessment/Plan   Assessment / Plan     Active Hospital Problems:  Active Hospital Problems    Diagnosis    • Severe malnutrition (CMS/HCC)    • Acute on chronic respiratory failure with hypoxia (HCC)      Impression:   Large right pleural effusion: s/p thoracentesis, transudative  Acute hypoxic respiratory failure  Acute on chronic diastolic heart failure: proBNP 16,990  CKD stage III  Concern for community acquired pneumonia of unspecified   HTN  DM 2  Afib: non-compliant with Eliquis  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  Appears to be transudative effusion.  Resume daily PO lasix 2/8/2021 due to renal failure.   Trend renal panel and electrolytes probably can scale back to every other day soon.     Continue O2 to keep sats >90%.  Does not use O2 at home.   Wean Oxygen to tolerate SPO2 90-94%  Procal 0.12 cultures neg. Off ABX 2 days afebrile doing ok.  Continue bronchopulmonary hygiene.  Continue Eliquis.  Encourage activity. Up to chair as tolerated.     DVT prophylaxis:  Medical and mechanical DVT prophylaxis orders are present.    CODE STATUS:   Level Of Support Discussed With: Patient  Code Status (Patient has no pulse and is not breathing): CPR (Attempt to Resuscitate)  Medical Interventions (Patient has pulse or is breathing): Full Support    I personally reviewed  pertinent labs, imaging and provider notes.   Discussed with bedside nurse and will discuss with primary service.     Electronically signed by Lyle Duffy MD, 02/08/22, 8:19 PM EST.

## 2022-02-09 NOTE — DISCHARGE SUMMARY
[facility]         DISCHARGE SUMMARY    Patient Name: Tres Jackson  : 1940  MRN: [id number]    Date of Admission: 2022  Date of Discharge:  2022   Primary Care Physician: Erick Pedroza MD    Consults     Date and Time Order Name Status Description    2022 12:34 AM Inpatient Pulmonology Consult Completed           Presenting Problem:   Acute pulmonary edema (HCC) [J81.0]  Pleural effusion on right [J90]  Acute diastolic CHF (congestive heart failure) (HCC) [I50.31]  Acute on chronic respiratory failure with hypoxia (HCC) [J96.21]  Pneumonia of right lower lobe due to infectious organism [J18.9]  Atrial fibrillation, unspecified type (HCC) [I48.91]  Chronic renal impairment, unspecified CKD stage [N18.9]    Active and Resolved Hospital Problems:  Active Hospital Problems    Diagnosis POA   • Severe malnutrition (CMS/HCC) [E43] Yes   • Acute on chronic respiratory failure with hypoxia (HCC) [J96.21] Yes      Resolved Hospital Problems   No resolved problems to display.         Hospital Course     Hospital Course:    1. A/C Diastolic HF: As noted, this appears to be more so related to the patient noncompliance with the diuretics as to any acute coronary insult.  Patient without any ischemic type chest pains, enzymes are negative.  His BNP is up from around 8000 to 17,000 at the time of admission. We will continue with IV diuretics and follow-up labs daily...GFR is still much better than baseline, will continue with current diuretics.  Needs a little potassium.  His wife is request that I consult her cardiologist, Dr. Guzman... patient has still not bumped his renal function yet despite a significant drop in his BNP from 17 K to 5.6K, so continue same diuretics as of ...volume status is continuing to improve as of , he was switched to oral diuretics, and that is appropriate.---> Close follow-up with labs as outpatient needed     2. Persistent AFIB: Patient was switched  from atenolol to Coreg given the above. We will likely titrate dose shortly given his persistent hypertension. He had been noncompliant with his Eliquis as well, we gave him a dose this morning, but I am holding that going forward given his need for thoracentesis...resumed Eliquis yesterday evening 2/2 and rate is well controlled... ditto as of 2/7, will discontinue telemetry at this time.---> Prescription written for Eliquis again at time of discharge 2/9.      3. A/C Hypoxic Resp Failure: Appears multi factorial. Secondary to the CHF above as well as the pleural effusion noted below. He was on no home O2, currently requiring somewhere between 30 to 40% trach collar. Hopefully thoracentesis will significantly improve his oxygenation as well as continued diuresis and empiric antibiotics...still requiring 35-40% trach collar 24/7 as of 2/4, and this is disappointing since I was hoping to wean him to nocturnal oxygen only...he is down to 30% as of 2/7, and I guess this is the level he will need to go home on.  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  Pt has been using 28% aerosol trach collar while resting in bed.   RT CM assisted CNA with walk test to assess supplemental oxygen needs with activity.   Pt was placed on room air and SpO2 dropped to 86% before ambulation even began.  Pt was placed on 28% via venturi device and assisted to standing position but still was unable to maintain acceptable saturation, Spo2 dropped to 88%.   Oxygen was increased to 31% (6L) via venturi device on trach collar to maintain SpO2 above 89% throughout the remainder of the walk.     Overnight results are worrisome for possible DORA.  Pt desaturated on room air and was placed on 28% aerosol trach collar at 2358. Despite oxygen use, Mr Jackson experienced 67 desaturations throughout the study  totaling 2 hours and 8 min.  The deepest desaturation value was 78%.  RT CM reported the findings to Dr Duffy and TINA Briones, and referral to sleep lab was made.  Home oxygen orders placed.  Resting oxygen requirements are 28%, ambulating requirements are 31%, sleep requirements are yet to be determined.     4. R Pleural Effusion: Was confirmed on chest CT today and pulmonary has been notified. As noted he only received 1 dose of Eliquis earlier this morning, it since been discontinued...s/p tap 2/2 and had 1.9 L removed; it was negative for any obvious infection and for malignancy---> bedside ultrasound performed by Dr. Duffy just prior to discharge revealed at least partial reaccumulation of the fluid.  We will continue with attempts at aggressive diuresis, and have him follow-up with pulmonary as an outpatient.     5. Possible CAP: Rocephin/Zithromax completed x 7; cultures remain negative--->no temps as of 2/9.     6. Hypertension. This is curiously uncontrolled at present, despite several doses of his routine meds. Have room to advance carvedilol, but he was just started on this yesterday. Will give him a few doses of amlodipine, and certainly back off on this if blood pressure improves with further diuresis...BP much improved today 2/2, continue current dosing...blood pressure is too well controlled almost as of 2/3, will review his meds = ditto 2/4, no room to titrate beta-blocker and will defer Entresto to cardiology as outpatient---> ditto as of 2/9, and we will get him into see Dr. Guzman at his TCM appointment.     7. CKD 3b. GFR of 48 on admission is actually better than his baseline, and may be related to volume overload. He typically has his labs done at an outside facility...GFR still above normal as of 2/4, so continuing diuresis as noted above, monitoring labs daily...GFR just dropped from 54 to 44 as of 2/7.  Likely discontinue IV Lasix after review a.m. labs 2/8.---> 45 is holding as of  2/9, and will have follow-up labs as outpatient.     8. Persistent Nausea/Anorexia: This appears to be improving already with treatment of his above conditions.  May be this is related to significant hypoxia...Since resolved.     9. Gait Dysfunction/Recurrent Falls: OT/PT have been consulted. Hopefully will be steadier on his feet with improved oxygenation. He has fallen several times at home...OT/PT saw him on admission, and released him to the [facility] care.     10. DVT Prophylaxis: Via mechanical means while we are awaiting thoracentesis.     11.  PUD prophylaxis.  Via H2 blocker.    Day of Discharge     Vital Signs:  Temp:  [97.2 °F (36.2 °C)-98.1 °F (36.7 °C)] 98.1 °F (36.7 °C)  Heart Rate:  [56-84] 84  Resp:  [16-18] 16  BP: (108-117)/(69-83) 117/72  Flow (L/min):  [6] 6     Physical Exam:  Constitutional:       General: He is not in acute distress.     Appearance: Normal appearance. He is not toxic-appearing.   HENT:      Head: Atraumatic.      Right Ear: External ear normal.      Left Ear: External ear normal.      Nose: Nose normal.      Mouth/Throat:      Mouth: Mucous membranes are moist.   Eyes:      General:         Right eye: No discharge.         Left eye: No discharge.      Extraocular Movements: Extraocular movements intact.      Pupils: Pupils are equal, round, and reactive to light.   Cardiovascular:      Rate and Rhythm: Normal rate. Rhythm irregular.      Pulses: Normal pulses.      Heart sounds: Normal heart sounds. No murmur heard.  No gallop.    Pulmonary:      Effort: Pulmonary effort is normal. No respiratory distress.      Breath sounds: Rhonchi and rales present. No wheezing.      Comments: Improved breath sounds on the right that is post thoracentesis.---> Noted decreased breath sounds on right versus left.    Abdominal:      General: There is no distension.      Palpations: Abdomen is soft. There is no mass.      Tenderness: There is no abdominal tenderness. There is no guarding.    Musculoskeletal:         General: No swelling or tenderness.      Cervical back: No tenderness.      Right lower leg: Edema present.      Left lower leg: Edema present.      Comments: Edema all but resolved as of 2/8 and ditto 2/9.  Skin:     General: Skin is warm and dry.      Findings: No rash.   Neurological:      General: No focal deficit present.      Mental Status: He is alert and oriented to person, place, and time. Mental status is at baseline.      Motor: Weakness present.      Gait: Gait abnormal.      Comments: Strength is improving as of 2/9.   Psychiatric:         Mood and Affect: Mood normal.         Thought Content: Thought content normal.     Pertinent  and/or Most Recent Results     LAB RESULTS:      Lab 02/07/22  0441 02/06/22 0413   WBC 6.02 5.68   HEMOGLOBIN 15.0 15.0   HEMATOCRIT 44.2 44.6   PLATELETS 132* 137*   NEUTROS ABS 3.14 2.90   IMMATURE GRANS (ABS) 0.01 0.01   LYMPHS ABS 1.64 1.64   MONOS ABS 0.65 0.53   EOS ABS 0.54* 0.54*   MCV 92.1 95.3         Lab 02/09/22  0515 02/08/22  0911 02/07/22  0441 02/06/22  0413 02/05/22  0437 02/04/22  0423 02/04/22  0423   SODIUM 132* 133* 136 136 139   < > 135*   POTASSIUM 4.5 4.7 4.7 4.3 4.0   < > 4.3   CHLORIDE 93* 92* 94* 94* 95*   < > 97*   CO2 29.8* 32.1* 30.4* 30.0* 33.1*   < > 24.2   ANION GAP 9.2 8.9 11.6 12.0 10.9   < > 13.8   BUN 32* 31* 31* 29* 31*   < > 34*   CREATININE 1.51* 1.52* 1.52* 1.28* 1.36*   < > 1.32*   GLUCOSE 113* 125* 113* 111* 117*   < > 117*   CALCIUM 9.5 9.9 9.7 9.5 9.5   < > 9.1   MAGNESIUM  --   --  1.9 1.9  --   --  1.5*   PHOSPHORUS  --   --  3.2 3.0  --   --   --     < > = values in this interval not displayed.         Lab 02/07/22  0441 02/06/22  0413   TOTAL PROTEIN 6.0 6.3   ALBUMIN 3.30* 3.40*   ALT (SGPT) 8 7   AST (SGOT) 29 27   BILIRUBIN 1.0 1.2   INDIRECT BILIRUBIN 0.6 0.7   BILIRUBIN DIRECT 0.4* 0.5*   ALK PHOS 102 93         Lab 02/08/22  0911 02/04/22  0423   PROBNP 4,073.0* 5,607.0*                 Brief  Urine Lab Results     None        Microbiology Results (last 10 days)     Procedure Component Value - Date/Time    Respiratory Culture - Sputum, Cough [[id number]] Collected: 02/02/22 1811    Lab Status: Final result Specimen: Sputum from Cough Updated: 02/04/22 1108     Respiratory Culture No growth at 2 days     Gram Stain Occasional Yeast      Rare (1+) Gram positive bacilli      Mucous strands      Few (2+) WBCs seen    AFB Culture - Body Fluid, Pleural Cavity [[id number]] Collected: 02/02/22 1008    Lab Status: Preliminary result Specimen: Body Fluid from Pleural Cavity Updated: 02/09/22 1015     AFB Culture No AFB isolated at 1 week     AFB Stain No acid fast bacilli seen    Fungus Culture - Body Fluid, Pleural Cavity [[id number]] Collected: 02/02/22 1008    Lab Status: Preliminary result Specimen: Body Fluid from Pleural Cavity Updated: 02/09/22 1015     Fungus Culture No fungus isolated at 1 week    Body Fluid Culture - Body Fluid, Pleural Cavity [[id number]] Collected: 02/02/22 0945    Lab Status: Final result Specimen: Body Fluid from Pleural Cavity Updated: 02/05/22 0645     Body Fluid Culture No growth at 3 days     Gram Stain No organisms seen    Anaerobic Culture - Pleural Fluid, Pleural Cavity [[id number]] Collected: 02/02/22 0944    Lab Status: Final result Specimen: Pleural Fluid from Pleural Cavity Updated: 02/07/22 0653     Anaerobic Culture No anaerobes isolated at 5 days    Influenza Antigen, Rapid - Swab, Nasopharynx [[id number]]  (Normal) Collected: 01/31/22 2258    Lab Status: Final result Specimen: Swab from Nasopharynx Updated: 01/31/22 2343     Influenza A Ag, EIA Negative     Influenza B Ag, EIA Negative    COVID-19,APTIMA PANTHER(DICK),[facility], NP/OP SWAB IN UTM/VTM/[facility] [employer]SP[employer] MEDIA,24 HR TAT - Swab, Nasopharynx [[id number]]  (Normal) Collected: 01/31/22 2258    Lab Status: Final result Specimen: Swab from Nasopharynx Updated: 02/01/22 0437     COVID19 Not Detected    Narrative:       Fact sheet for providers: https://www.fda.gov/media/389608/download     Fact sheet for patients: https://www.fda.gov/media/134059/download    Test performed by RT PCR.    Blood Culture - Blood, Arm, Right [[id number]]  (Normal) Collected: 01/31/22 2255    Lab Status: Final result Specimen: Blood from Arm, Right Updated: 02/05/22 2315     Blood Culture No growth at 5 days    Blood Culture - Blood, Arm, Left [[id number]]  (Normal) Collected: 01/31/22 2255    Lab Status: Final result Specimen: Blood from Arm, Left Updated: 02/05/22 2315     Blood Culture No growth at 5 days          CT Chest Without Contrast Diagnostic    Result Date: 2/1/2022  Impression:   1. Interlobular septal thickening, suggesting interstitial pulmonary edema. 2. Large right and small left pleural effusions with bibasilar atelectasis.  A superimposed component of pneumonia is possible. 3. Cardiomegaly. 4. Cholelithiasis.      SAMI ZAMUDIO MD       Electronically Signed and Approved By: SAMI ZAMUDIO MD on 2/01/2022 at 13:20             XR Chest 1 View    Result Date: 2/4/2022  Impression:    1. Improving aeration in the left and lower lung.  2. Persistent consolidation in the right mid and lower lung with small pleural effusion.  3. Cardiomegaly with pulmonary vascular congestion.       JOEY CALVERT MD       Electronically Signed and Approved By: JOEY CALVERT MD on 2/04/2022 at 8:57             XR Chest 1 View    Result Date: 2/2/2022  Impression:  Persistent but smaller right pleural effusion.  No pneumothorax.       PEREZ AGUILERA MD       Electronically Signed and Approved By: PEREZ AGUILERA MD on 2/02/2022 at 10:05                       Results for orders placed during the hospital encounter of 10/13/21    Adult Transthoracic Echo Complete W/ Cont if Necessary Per Protocol    Interpretation Summary  1.  Technically limited study.  2.  Normal left ventricular systolic function.  3.  Trace mitral regurgitation.  4.  Mild tricuspid  regurgitation.  5.  Significant pulmonary hypertension.      Labs Pending at Discharge:  Pending Labs     Order Current Status    AFB Culture - Body Fluid, Pleural Cavity Preliminary result    Fungus Culture - Body Fluid, Pleural Cavity Preliminary result            Discharge Details        Discharge Medications      New Medications      Instructions Start Date   apixaban 2.5 MG tablet tablet  Commonly known as: ELIQUIS   2.5 mg, Oral, Every 12 Hours Scheduled      carvedilol 12.5 MG tablet  Commonly known as: COREG   12.5 mg, Oral, 2 Times Daily With Meals      famotidine 20 MG tablet  Commonly known as: PEPCID   20 mg, Oral, Nightly      potassium chloride 10 MEQ CR capsule  Commonly known as: MICRO-K   20 mEq, Oral, Daily   Start Date: February 10, 2022        Changes to Medications      Instructions Start Date   lisinopril 20 MG tablet  Commonly known as: PRINIVILZESTRIL  What changed:   · medication strength  · how much to take  · when to take this   20 mg, Oral, Every 12 Hours Scheduled         Continue These Medications      Instructions Start Date   allopurinol 100 MG tablet  Commonly known as: ZYLOPRIM   TAKE 1 TABLET DAILY      furosemide 40 MG tablet  Commonly known as: Lasix   40 mg, Oral, Daily      pravastatin 20 MG tablet  Commonly known as: PRAVACHOL   20 mg, Oral, Daily      Synthroid 100 MCG tablet  Generic drug: levothyroxine   100 mcg, Oral, Daily             No Known Allergies      Discharge Disposition:  Home-Health Care Svc    Diet:  Diet Instructions     Diet: Cardiac      Discharge Diet: Cardiac            Discharge Activity:   Activity Instructions     Gradually Increase Activity Until at Pre-Hospitalization Level              CODE STATUS:  Code Status and Medical Interventions:   Ordered at: 02/01/22 0627     Level Of Support Discussed With:    Patient     Code Status (Patient has no pulse and is not breathing):    CPR (Attempt to Resuscitate)     Medical Interventions (Patient has pulse  or is breathing):    Full Support         Future Appointments   Date Time Provider Department Center   2/22/2022  3:00 PM Erick Pedroza MD [facility]   3/9/2022  3:45 PM Navya Bajwa MD [facility]       Additional Instructions for the Follow-ups that You Need to Schedule     Ambulatory Referral to Sleep Medicine   As directed      67 desaturations totaling 2 hours and 8 min on overnight pulse ox study performed on 28% aerosol trach collar    Order Comments: 67 desaturations totaling 2 hours and 8 min on overnight pulse ox study performed on 28% aerosol trach collar          Discharge Follow-up with PCP   As directed       Currently Documented PCP:    Erick Pedroza MD    PCP Phone Number:    [phone]     Follow Up Details: Dr. Pedroza 7 to 10 days.               Time spent on Discharge including face to face service: 36 minutes    Electronically signed by Erick Pedroza MD, 02/09/22, 6:26 PM EST.

## 2022-02-09 NOTE — CASE MANAGEMENT/SOCIAL WORK
RT CM reviewed overnight pulse ox results.  Overnight results are worrisome for possible DORA.  Pt desaturated on room air and was placed on 28% aerosol trach collar at 2358.  Despite oxygen use, Mr Jackson experienced 67 desaturations throughout the study totaling 2 hours and 8 min.  The deepest desaturation value was 78%.  RT CM reported the findings to Dr Duffy and TINA Briones, and referral to sleep lab was made.  Home oxygen orders placed.  Resting oxygen requirements are 28%, ambulating requirements are 31%, sleep requirements are yet to be determined.

## 2022-02-09 NOTE — PLAN OF CARE
Goal Outcome Evaluation:  Plan of Care Reviewed With: patient        Progress: no change  Outcome Summary: VSS. Patients O2 sats in the 80's without trach collar. No acute changes or events overnight. Continuing to monitor. FARIDA,RN

## 2022-02-10 NOTE — OUTREACH NOTE
Call Center TCM Note      Responses   [facility] patient discharged from? [facility]   Does the patient have one of the following disease processes/diagnoses(primary or secondary)? CHF   TCM attempt successful? Yes   Call start time 1357   Call Status Comments Patient has a trach- uses a device to speak   Call end time 1400   Discharge diagnosis A/C Diastolic HF,   Persistent AFIB   Is patient permission given to speak with other caregiver? Yes   List who call center can speak with spouse- Ashley   Person spoke with today (if not patient) and relationship spouse/ Patient   Meds reviewed with patient/caregiver? Yes   Is the patient having any side effects they believe may be caused by any medication additions or changes? No   Does the patient have all medications ordered at discharge? Yes   Is the patient taking all medications as directed (includes completed medication regime)? Yes   Does the patient have a primary care provider?  Yes   Does the patient have an appointment with their PCP within 7 days of discharge? Greater than 7 days   Comments regarding PCP appt with PCP on 2/22   Nursing Interventions Verified appointment date/time/provider   Has the patient kept scheduled appointments due by today? N/A   [facility]    Has home health visited the patient within 72 hours of discharge? Yes   Psychosocial issues? No   Did the patient receive a copy of their discharge instructions? Yes   Nursing interventions Reviewed instructions with patient   What is the patient's perception of their health status since discharge? Improving   Is the patient able to teach back signs and symptoms of worsening condition? (i.e. weight gain, shortness of air, etc.) Yes   Is the patient/caregiver able to teach back the hierarchy of who to call/visit for symptoms/problems? PCP, Specialist, Home health nurse, Urgent Care, ED, 911 Yes   TCM call completed? Yes   Wrap up additional comments Patient  says he is doing well, he spoke via his speaking device, confirmed appt with PCP for 2/22, no questions.           Shelley Rios RN    2/10/2022, 14:01 EST

## 2022-02-17 NOTE — OUTREACH NOTE
CHF Week 2 Survey      Responses   [facility] patient discharged from? [facility]   Does the patient have one of the following disease processes/diagnoses(primary or secondary)? CHF   Week 2 attempt successful? Yes   Call start time 1602   Call end time 1604   Discharge diagnosis A/C Diastolic HF,   Persistent AFIB   Is patient permission given to speak with other caregiver? Yes   Person spoke with today (if not patient) and relationship spouse/ Patient   Meds reviewed with patient/caregiver? Yes   Is the patient having any side effects they believe may be caused by any medication additions or changes? No   Does the patient have all medications ordered at discharge? Yes   Is the patient taking all medications as directed (includes completed medication regime)? Yes   Does the patient have a primary care provider?  Yes   Comments regarding PCP appt with PCP on 2/22   Has the patient kept scheduled appointments due by today? N/A   What [facility] the [facility]    Has home health visited the patient within 72 hours of discharge? Yes   Psychosocial issues? No   Did the patient receive a copy of their discharge instructions? Yes   Nursing interventions Reviewed instructions with patient   What is the patient's perception of their health status since discharge? Improving   Nursing interventions Nurse provided patient education   Is the patient weighing daily? No   Does the patient have scales? Yes   Daily weight interventions Education provided on importance of daily weight   Is the patient able to teach back Heart Failure diet management? Yes   Is the patient able to teach back Heart Failure Zones? Yes   Is the patient able to teach back signs and symptoms of worsening condition? (i.e. weight gain, shortness of air, etc.) Yes   If the patient is a current smoker, are they able to teach back resources for cessation? Not a smoker   Is the patient/caregiver able to teach back the hierarchy of who to  "call/visit for symptoms/problems? PCP, Specialist, Home health nurse, Urgent Care, ED, 911 Yes   Additional teach back comments Encouraged daily weights.   CHF Week 2 call completed? Yes   Wrap up additional comments Improving, being his \"sassy self\" she says.          Avani Gloria RN  "

## 2022-02-21 PROBLEM — R11.0 NAUSEA: Status: RESOLVED | Noted: 2022-01-01 | Resolved: 2022-01-01

## 2022-02-22 NOTE — ASSESSMENT & PLAN NOTE
TSH was fine in June 2021.  Patient is stable on levothyroxine 100 mcg daily.  Continue same with level on return to office.---> TSH was well normal labs done after 10/21 OV, so patient stable to continue current dose of Synthroid 100 mcg daily.

## 2022-02-22 NOTE — PROGRESS NOTES
Chief Complaint  Hospital Follow Up Visit (Pt states that he is using his O2 at home. 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  A/C diastolic heart failure.  BNP down from 17 K to 4 K at d/c.  2.  Persistent A. fib.  Switch from atenolol to Coreg, Eliquis resumed.  3.  A/C Hypoxic Resp Failure. Overnight results are worrisome for possible DORA.  Pt desaturated on room air and was placed on 28% aerosol trach collar at 2358. Despite oxygen use, Mr Jackson experienced 67 desaturations throughout the study totaling 2 hours and 8 min.  The deepest desaturation value was 78%.  RT CM reported the findings to Dr Duffy and TINA Briones, and referral to sleep lab was made.  Home oxygen orders placed.  Resting oxygen requirements are 28%, ambulating requirements are 31%, sleep requirements are yet to be determined.  4.  R Pleural Effusion. s/p tap 2/2 and had 1.9 L removed; it was negative for any obvious infection and for malignancy---> bedside ultrasound performed by Dr. Duffy just prior to discharge revealed at least partial reaccumulation of the fluid.  We will continue with attempts at aggressive diuresis, and have him follow-up with pulmonary as an outpatient.  5.  Hypertension. = no room to titrate beta-blocker and will defer Entresto to cardiology = we will get him into see Dr. Guzman.      Review of Systems   Constitutional: Negative for appetite change, fatigue and fever.   HENT: Negative for congestion and ear pain.    Eyes: Negative for blurred vision.   Respiratory: Negative for  "cough, chest tightness, shortness of breath and wheezing.    Cardiovascular: Negative for chest pain, palpitations and leg swelling.   Gastrointestinal: Negative for abdominal pain.   Genitourinary: Negative for difficulty urinating, dysuria and hematuria.   Musculoskeletal: Negative for arthralgias and gait problem.   Skin: Negative for skin lesions.   Neurological: Negative for syncope, memory problem and confusion.   Psychiatric/Behavioral: Negative for self-injury and depressed mood.       Objective   Vital Signs:   BP 91/61   Pulse 97   Temp 98.2 °F (36.8 °C)   Ht 167.6 cm (65.98\")   Wt 75.8 kg (167 lb)   SpO2 (!) 87%   BMI 26.97 kg/m²           Physical Exam  Vitals and nursing note reviewed.   Constitutional:       General: He is not in acute distress.     Appearance: Normal appearance. He is not toxic-appearing.   HENT:      Head: Atraumatic.      Right Ear: External ear normal.      Left Ear: External ear normal.      Nose: Nose normal.      Mouth/Throat:      Mouth: Mucous membranes are moist.   Eyes:      General:         Right eye: No discharge.         Left eye: No discharge.      Extraocular Movements: Extraocular movements intact.      Pupils: Pupils are equal, round, and reactive to light.   Neck:      Comments: Chronic trach.  Cardiovascular:      Rate and Rhythm: Normal rate. Rhythm irregular.      Pulses: Normal pulses.      Heart sounds: Normal heart sounds. No murmur heard.  No gallop.       Comments: Irregularly irregular, no S3.  Pulmonary:      Effort: Pulmonary effort is normal. No respiratory distress.      Breath sounds: No wheezing, rhonchi or rales.      Comments: Patient with decreased breath sounds on the right, dullness about assisted up.  No labored respirations.  Abdominal:      General: There is no distension.      Palpations: Abdomen is soft. There is no mass.      Tenderness: There is no abdominal tenderness. There is no guarding.   Musculoskeletal:         General: No " swelling or tenderness.      Cervical back: No tenderness.      Right lower leg: Edema present.      Left lower leg: Edema present.      Comments: Patient with 2+ edema on the left, 1+ on the right, extending to about mid calf.   Skin:     General: Skin is warm and dry.      Findings: No rash.   Neurological:      General: No focal deficit present.      Mental Status: He is alert and oriented to person, place, and time. Mental status is at baseline.      Motor: No weakness.      Gait: Gait normal.   Psychiatric:         Mood and Affect: Mood normal.         Thought Content: Thought content normal.          Result Review :   The following data was reviewed by: Erick Pedroza MD on 09/03/2021:           Assessment and Plan    Diagnoses and all orders for this visit:    1. Acute diastolic CHF (congestive heart failure) (HCC) (Primary)  Overview:  This is a new issue as of his September 2021 appointment.  Hopefully it simply related to the apparent atrial fibrillation.  We will need to start diuretics, get an echo, and follow him up very shortly...Echo 10/21 with normal EF, no concerning valve changes, he does have significant pulmonary hypertension.      Assessment & Plan:  Continue with current dose of Lasix, get labs as noted, should be okay to titrate his Tenormin...BNP was 9500 following his 10/21 OV.  BUN/creatinine had trended up some, so need to see results soon as of 11/21 before can make further medication recommendations.---> In the hospital his BNP trended down from 17 K to 4 K at the time of discharge.  Unfortunately as of his TCM his BNP is already back up over 7K.  Patient reports compliance with his medications as prescribed, and at least from blood pressure standpoint, it seems like he is compliant since blood pressure is down.  His renal function is improved, which likely evidence of increased volume, and also lends support to the accuracy of the current BNP.  Will increase Lasix to 40 mg twice daily  now with close outpatient follow-up.      Orders:  -     Ambulatory Referral to Cardiology  -     BNP; Standing  -     Basic Metabolic Panel; Standing  -     XR Chest PA & Lateral; Future    2. Essential hypertension  Assessment & Plan:  Blood pressure is up the past 2 office visits, so we need to increase his medications.  I was going to titrate his Tenormin, but on exam he is noted to be in acute heart failure likely secondary to new onset A. fib.  We will see where his blood pressure goes with diuresis, but probably will end up switching him to Coreg and titrating this.  Just one make 1 medication change at a time with him...Will titrate Tenormin for heart rate and blood pressure control as of 10/21 office...being switched to Coreg as of 1/22 office visit---> blood pressures down as of his 2/22 TCM appointment, despite his volume being back up.  Will lower lisinopril to once daily, to allow some room for twice daily Lasix.        3. Persistent atrial fibrillation (HCC)  Overview:  This appears to be a new issue = yes, EKG done today 9/3/2021 to evaluate the patient's irregular heartbeat noted on exam confirms this.     Assessment & Plan:   The EKG reveals new onset A. 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  Additionally there is an age-indeterminate high lateral MI with new changes noted in leads I and aVL compared with the prior.  There are no active acute ischemic changes identified.  Plan per orders with anticoagulation, diuresis, and close follow-up...Patient's echo with no obvious wall motion abnormalities, so we will address better rate control with his Tenormin and defer changes to Coreg at this time at least.  Continue with Eliquis for anticoagulation...continue same meds as of 11/21 OV.  Patient is rate controlled this OV---> patient's rate is reasonably controlled as of 2/22 TCM appointment.  He is to continue with moderate dose carvedilol for rate and dose  adjusted Eliquis for anticoagulation.  Additionally we will get him in with Dr. Guzman.      Orders:  -     Ambulatory Referral to Cardiology    4. Pulmonary hypertension (HCC)  Assessment & Plan:  This was noted on the echo.  We are trying to get overnight O2 sats.  Still do not have this as of 11/21.  We will investigate---> as noted, patient was tested in the hospital and noted to require 28% at rest, 31% with ambulation, but they could not get a stable saturation determination for nocturnal settings.  To this and he has an appointment in the sleep lab in just about a week or 2.  Best we can do for now is continue with the current settings, certainly recommend he use the higher setting at night.  Additionally we will get follow-up in Dr. Montalvo's office.      Orders:  -     Ambulatory Referral to Pulmonology    5. Stage 3b chronic kidney disease (HCC)  Assessment & Plan:  34% = still looks dry and I d/w him in detail need to see Renal if no better on RTO; I d/w stop nsaids please...GFR is 49 as of his September 2021 appointment.  This is a nice improvement, he does not need to see the nephrologist now, I will continue to monitor this...GFR was down a bit to 30 on those labs after 10/21 OV.  Corresponding BUN/creatinine were 29 and 2.0.  Need labs soon as of 11/21 OV---> GFR is holding at 43 as of his 2/22 TCM office visit, but he does appear to be a little bit volume overloaded, so will need close follow-up with adjustment to his medications.          6. Acute on chronic respiratory failure with hypoxia (HCC)  Assessment & Plan:  Patient sats are 87% here in the office, he does not have his oxygen with him.  Discussed with the patient he is to be on 28% at rest, and 31% with ambulation and while sleeping.  He will need follow-up with pulmonology as well.  Of note he is scheduled next month in the sleep lab for evaluation given marked nocturnal hypoxia in the hospital.    Orders:  -     Ambulatory Referral to  Pulmonology    7. Recurrent pleural effusion on right  -     Ambulatory Referral to Pulmonology  -     XR Chest PA & Lateral; Future    Other orders  -     Cancel: SCANNED - LABS  -     lisinopril (PRINIVIL,ZESTRIL) 20 MG tablet; Take 1 tablet by mouth Daily.  Dispense: 90 tablet; Refill: 1  -     furosemide (LASIX) 40 MG tablet; Take 1 tablet by mouth 2 (Two) Times a Day.  Dispense: 180 tablet; Refill: 1  -     potassium chloride (MICRO-K) 10 MEQ CR capsule; Take 2 capsules by mouth 2 (Two) Times a Day.  Dispense: 180 capsule; Refill: 1    --  --  OLDER NOTES:  (D/W HIM ON RTO GERD/STOMACH ISSUES THAT WIFE D/W ME ON MONDAY---> I d/w him 3/21 and he denies it; will repeat CBC on RTO)  --I have called pharmacies in [facility] and they have no record of Prevnar, I have called pt's wife and she feels that he has not received it yet. MB---> I will address this after Covid shots done.    VISIT 6/21:  ANNUAL MEDICARE WELLNESS PHYSICAL 8/20 OV=forms reviewed in room with pt; no new issues raised.  --  HTN well controlled...but f/u off HCTZ...as above; will try increasing ACEI, but may need another agent on RTO...improved...up and down at home...pt here a year later and needs increase meds 8/20...some better 11/20 despite intol to Norvasc 5 mg=edema; will use another later if BP trends higher... stable 3/21---> is up 6/21, so increase meds if same on RTO.  CKD3 stable...35/1.4 (59%)...60/2.0, not drinking as well, some loose stools, no pains/no hematuria/etc, so will just lose the HCTZ for now...25/1.3...43/1.7 is likely related to being dry, so ok to stay on low dose nsaid=1/2 of 600 mg, but will check sooner...26/1.3 is back to baseline...54%...49%...53%...42% is after not being seen past year as of 8/20 OV...50% is nice to see 11/20...43% is ok for now 3/21---> 34% = still looks dry and I d/w him in detail need to see Renal if no better on RTO; I d/w stop nsaids please.  --  DM well controlled and he's comfortable being on  actos...remains well controlled...6.1...5.7 is ok since no lows, no sweats/etc = ditto...same=5.8 with no lows as of 8/20 OV...5.5 and will stop actos now as of 11/20...5.6 off it is great---> 6.1 is fine 6/21.  THYROID stable on 75 qd...stable 9/16 = 2.6...7.2, so increase to 100...1.9 is great...fine 9/18...wnl 4/19---> fine 6/21.  --  CAD with no ischemia...appears stable, but does have some fatigue c/o at 3/18 OV, so will consider SPECT if persists---> no CP/SOB 8/20.  LIPIDS at goal=LDL 78---> 97 is fine 6/21.  --  H/N CA (9/08) s/p laryngectomy and XRT...per Dr Maya q 6 mo.  SKIN CANCER=RUE per Dr aMya; had PET for this and above as of 4/17 OV=neg per report---> seeing Dr Rivera q 2-3 months as of 8/19 OV.  --  GOUT with no recent flare...6.5...6.4---> 6 in 11/20.  VIT B12 DEF=9/18 = 1K OTC to start---> 1500.  --  --  PSA 0.4 on 4/17/17.  COLON not rec given age and no sx's.  Havrix-Hep A 8/18, Shingles 2019; COVID x1 as of 6/21 OV=[employer].  Prevnar rec 9/17; rec Pneumovax if he got prevnar as of 1/19 OV, but he didn't, so go get it now...he can't say 4/19 = we will call; d/w COVID shot needed 3/21.  Flu shot for 2021 season given at his 10/21 office visit.  ([place] scared about being on water alone=trach; Jade/Jamison both here in town).    Follow Up   Return in about 3 weeks (around 3/15/2022).  Patient was given instructions and counseling regarding his condition or for health maintenance advice. Please see specific information pulled into the AVS if appropriate.

## 2022-02-22 NOTE — ASSESSMENT & PLAN NOTE
Blood pressure is up the past 2 office visits, so we need to increase his medications.  I was going to titrate his Tenormin, but on exam he is noted to be in acute heart failure likely secondary to new onset A. fib.  We will see where his blood pressure goes with diuresis, but probably will end up switching him to Coreg and titrating this.  Just one make 1 medication change at a time with him...Will titrate Tenormin for heart rate and blood pressure control as of 10/21 office...being switched to Coreg as of 1/22 office visit---> blood pressures down as of his 2/22 TCM appointment, despite his volume being back up.  Will lower lisinopril to once daily, to allow some room for twice daily Lasix.

## 2022-02-22 NOTE — ASSESSMENT & PLAN NOTE
This was noted on the echo.  We are trying to get overnight O2 sats.  Still do not have this as of 11/21.  We will investigate---> as noted, patient was tested in the hospital and noted to require 28% at rest, 31% with ambulation, but they could not get a stable saturation determination for nocturnal settings.  To this and he has an appointment in the sleep lab in just about a week or 2.  Best we can do for now is continue with the current settings, certainly recommend he use the higher setting at night.  Additionally we will get follow-up in Dr. Montalvo's office.

## 2022-02-22 NOTE — ASSESSMENT & PLAN NOTE
Patient stable on low-dose allopurinol.  No recent flares as of 11/21.  Level on return to office after the new year.

## 2022-02-22 NOTE — ASSESSMENT & PLAN NOTE
A1c was 6.1 in June 2021.  His fasting sugar is very reasonable as of 10/21 OV.  We will repeat A1c after the new year.

## 2022-02-22 NOTE — ASSESSMENT & PLAN NOTE
The EKG reveals new onset A. 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  Additionally there is an age-indeterminate high lateral MI with new changes noted in leads I and aVL compared with the prior.  There are no active acute ischemic changes identified.  Plan per orders with anticoagulation, diuresis, and close follow-up...Patient's echo with no obvious wall motion abnormalities, so we will address better rate control with his Tenormin and defer changes to Coreg at this time at least.  Continue with Eliquis for anticoagulation...continue same meds as of 11/21 OV.  Patient is rate controlled this OV---> patient's rate is reasonably controlled as of 2/22 TCM appointment.  He is to continue with moderate dose carvedilol for rate and dose adjusted Eliquis for anticoagulation.  Additionally we will get him in with Dr. Guzman.

## 2022-02-22 NOTE — ASSESSMENT & PLAN NOTE
34% = still looks dry and I d/w him in detail need to see Renal if no better on RTO; I d/w stop nsaids please...GFR is 49 as of his September 2021 appointment.  This is a nice improvement, he does not need to see the nephrologist now, I will continue to monitor this...GFR was down a bit to 30 on those labs after 10/21 OV.  Corresponding BUN/creatinine were 29 and 2.0.  Need labs soon as of 11/21 OV---> GFR is holding at 43 as of his 2/22 TCM office visit, but he does appear to be a little bit volume overloaded, so will need close follow-up with adjustment to his medications.

## 2022-02-22 NOTE — ASSESSMENT & PLAN NOTE
LDL was 97 in June 2021.  Patient is stable on low-dose pravastatin, continue same, repeat level return to office after the new year.

## 2022-02-22 NOTE — ASSESSMENT & PLAN NOTE
Continue with current dose of Lasix, get labs as noted, should be okay to titrate his Tenormin...BNP was 9500 following his 10/21 OV.  BUN/creatinine had trended up some, so need to see results soon as of 11/21 before can make further medication recommendations.---> In the hospital his BNP trended down from 17 K to 4 K at the time of discharge.  Unfortunately as of his TCM his BNP is already back up over 7K.  Patient reports compliance with his medications as prescribed, and at least from blood pressure standpoint, it seems like he is compliant since blood pressure is down.  His renal function is improved, which likely evidence of increased volume, and also lends support to the accuracy of the current BNP.  Will increase Lasix to 40 mg twice daily now with close outpatient follow-up.

## 2022-02-22 NOTE — ASSESSMENT & PLAN NOTE
Patient sats are 87% here in the office, he does not have his oxygen with him.  Discussed with the patient he is to be on 28% at rest, and 31% with ambulation and while sleeping.  He will need follow-up with pulmonology as well.  Of note he is scheduled next month in the sleep lab for evaluation given marked nocturnal hypoxia in the hospital.

## 2022-03-03 NOTE — PROGRESS NOTES
Primary Care Provider  Erick Pedroza MD   Referring Provider  Eirck Pedroza MD      Patient Complaint  Establish Care (New Patient ) and Pulmonary Hypertension      Subjective          Tres Jackson presents to [facility] PULMONARY & CRITICAL CARE MEDICINE      History of Presenting Illness  Tres Jackson is a 81 y.o. pleasant elderly gentleman with history of diastolic heart failure and atrial fibrillation, comes to the pulmonary clinic as a new patient for evaluation of possible pulmonary hypertension.  The patient is active physically with some dyspnea on exertion that has been stable except for when he was found to have a right pleural effusion that was removed about 2 to 3 weeks ago.  It was felt to be related to the heart condition.  The patient is gives no history of cough wheezing or chest pain.  The dyspnea he has is mild on exertion and is back to his baseline.  He reports mild to moderate ankle swelling that respond nicely to Lasix 40 mg twice daily.  The patient denies fevers, chills and night sweats.  The patient, as stated above, is able to perform ADLs without difficulties and denies any swollen glands/lymph nodes in the head or neck.  He has head and neck cancer in 2007 that was operated on and speaks using a device since then    I have personally reviewed the review of systems, past family, social, medical and surgical histories; and agree with their findings.        Review of Systems  Constitutional symptoms:  Denied complaints   Ear, nose, throat: Denied complaints  Cardiovascular:  Denied complaints  Respiratory: See above  Gastrointestinal: Denied complaints  Musculoskeletal: Denied complaints  Genitourinary: Denied complaints  Allergy / Immunology: Denied complaints  Hematologic: Denied complaints  Neurologic: Denied complaints  Skin: Denied complaints  Endocrine: Denied complaints  Psychiatric: Denied complaints      History reviewed. No pertinent family history.      Social History     Socioeconomic History   • Marital status:    Tobacco Use   • Smoking status: Former Smoker     Packs/day: 2.00     Types: Cigarettes     Quit date:      Years since quittin.1   • Smokeless tobacco: Never Used   Vaping Use   • Vaping Use: Never used   Substance and Sexual Activity   • Alcohol use: Yes   • Drug use: Never   • Sexual activity: Defer        Past Medical History:   Diagnosis Date   • Atherosclerotic 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 pectoris    • Atrophy of thyroid (acquired)    • Cancer (HCC)     HTM Cancer Dx    • Chronic fatigue, unspecified    • Chronic kidney disease, stage III (moderate) (HCC)    • Dysuria    • Essential (primary) hypertension    • Heart disease    • Hx of radiation therapy     XRT   • Hypothyroidism, unspecified    • Idiopathic gout, unspecified site    • Impaired fasting glucose    • Mixed hyperlipidemia    • Pure hypercholesterolemia    • Type 2 diabetes mellitus with diabetic chronic kidney disease (HCC)         Immunization History   Administered Date(s) Administered   • COVID-19 (PFIZER) PURPLE CAP 2021   • Flu Vaccine Split Quad 2013   • Fluzone High-Dose 65+yrs 10/20/2021   • Influenza, Unspecified 2020         No Known Allergies       Current Outpatient Medications:   •  allopurinol (ZYLOPRIM) 100 MG tablet, TAKE 1 TABLET DAILY (Patient taking differently: Take 100 mg by mouth Daily.), Disp: 90 tablet, Rfl: 1  •  apixaban (ELIQUIS) 2.5 MG tablet tablet, Take 1 tablet by mouth Every 12 (Twelve) Hours. Indications: Atrial Fibrillation, Disp: 60 tablet, Rfl: 5  •  carvedilol (COREG) 12.5 MG tablet, Take 1 tablet by mouth 2 (Two) Times a Day With Meals., Disp: 60 tablet, Rfl: 5  •  famotidine (PEPCID) 20 MG tablet, Take 1 tablet by mouth Every Night., Disp: 90 tablet, Rfl: 1  •  furosemide (LASIX) 40 MG tablet, Take 1 tablet by mouth 2 (Two) Times a Day., Disp: 180 tablet, Rfl: 1  •   "levothyroxine (SYNTHROID, LEVOTHROID) 100 MCG tablet, TAKE 1 TABLET DAILY (Patient taking differently: Take 100 mcg by mouth Daily.), Disp: 90 tablet, Rfl: 1  •  lisinopril (PRINIVIL,ZESTRIL) 20 MG tablet, Take 1 tablet by mouth Daily., Disp: 90 tablet, Rfl: 1  •  potassium chloride (MICRO-K) 10 MEQ CR capsule, Take 2 capsules by mouth 2 (Two) Times a Day., Disp: 180 capsule, Rfl: 1  •  pravastatin (PRAVACHOL) 20 MG tablet, Take 20 mg by mouth Daily., Disp: , Rfl:          Objective     Vital Signs:   /77 (BP Location: Right arm, Patient Position: Sitting, Cuff Size: Adult)   Pulse 91   Temp 97.8 °F (36.6 °C) (Temporal)   Resp 18   Ht 162.6 cm (64\")   Wt 72.6 kg (160 lb)   SpO2 97% Comment: room air  BMI 27.46 kg/m²     Physical Exam  Vital Signs Reviewed   WDWN, Alert, NAD.    HEENT:  PERRL, EOMI.  OP, nares clear, no sinus tenderness  Neck:  Supple, no JVD, no masses, skin and subcu cutaneous chronic changes related to the head and neck cancer he had in the past.  Lymph: no axillary, cervical, supraclavicular lymphadenopathy noted bilaterally  Chest: Diminished breath sounds with dullness on percussion right lower third posteriorly, no rales or wheezing.  No work of breathing noted  CV: Irregular rate and rhythm,  Abd:  Soft,  EXT:  no clubbing, no cyanosis, +1 edema, no joint tenderness  Neuro:  A&Ox3, CN grossly intact, no focal deficits.  Skin: No rashes or lesions noted       Result Review :   I have personally reviewed the medical records including labs and imaging reports and films.     This is a chest x-ray that was performed on February 24 that shows right pleural effusion.      ECHO September, 2021            Pulmonary Functions Testing Results:    No results found for: FEV1, FVC, FLS5ILQ, TLC, DLCO        Assessment and Plan      Patient Active Problem List   Diagnosis   • Malignant neoplasm of ear, nose, and throat   • Tinea unguium   • Essential hypertension   • Mixed hyperlipidemia   • " IFG (impaired fasting glucose)   • Hypothyroidism due to acquired atrophy of thyroid   • Stage 3b chronic kidney disease (HCC)   • Vitamin B12 deficiency   • Other fatigue   • Persistent atrial fibrillation (HCC)   • Acute diastolic CHF (congestive heart failure) (HCC)   • Idiopathic chronic gout of foot without tophus   • Atherosclerotic 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 pectoris   • Pulmonary hypertension (HCC)   • Acute on chronic respiratory failure with hypoxia (HCC)   • Severe malnutrition (CMS/HCC)           Impression and Plan:  Mr. Jackson is an 81-year-old gentleman with history of diastolic heart failure and recent admission about 3 weeks ago because of acute decompensation of the heart failure with pulmonary edema and right effusion.  The effusion was therapeutically removed.  The chest x-ray done on 24 February showed reaccumulation of the fluid.  The reason for the patient's referral to the clinic is to evaluate for pulmonary hypertension.  In reviewing the patient's records, echocardiogram shows normal right heart chambers and valves.  The reported right ventricular systolic pressure of 63 is most probably a false high value considering the clinical evaluation, lack of hypoxemia, and normal right atrium, right ventricle, and the right valvular structures.  At most it might be mild to moderate pulmonary arterial hypertension that is most probably related to the left heart failure for which there is no specific treatment except for heart failure management at this point.  The right pleural effusion seen on the x-ray would need therapeutic thoracentesis.  We will schedule the patient for right pleural effusion removal and recommend aggressive heart failure management.  The patient is on Eliquis for atrial fibrillation; it will be put on hold for 5 days prior to procedure.  Further evaluation of pulmonary arterial pressure with right heart catheterization is too aggressive considering  the clinical picture and the patient's age.  The patient was instructed to return to the clinic on an as-needed basis.      Medications personally reviewed.      Follow Up     Patient was given instructions and counseling regarding his condition or for health maintenance advice. Please see specific information pulled into the AVS if appropriate.

## 2022-03-04 NOTE — PROGRESS NOTES
Chief Complaint  Acute Diastolic CHF and Atrial Fibrillation    Subjective            Tres Jackson presents to [facility] CARDIOLOGY  History of Present Illness    81-year-old white male.  He has coronary artery disease, he had a catheterization about 18 years ago and from what he can describe it sounds like he has a chronic total occlusion with collaterals but I do not have access to that procedure note.  He had previous laryngectomy for malignancy and uses a electronic voice assist to communicate.  He was recently hospitalized with shortness of breath and appeared to have acute on chronic diastolic heart failure.  He was somewhat noncompliant with his medical therapy and that was readjusted and he is doing better at this point.  He denies chest pain or excessive shortness of breath.  His echo in October showed grade 2 diastolic dysfunction, with elevated pulmonary pressures with normal left ventricular systolic function.    PMH  Past Medical History:   Diagnosis Date   • Atherosclerotic 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 pectoris    • Atrophy of thyroid (acquired)    • Cancer (HCC)     HTM Cancer Dx 2007   • Chronic fatigue, unspecified    • Chronic kidney disease, stage III (moderate) (HCC)    • Dysuria    • Essential (primary) hypertension    • Heart disease    • Hx of radiation therapy     XRT   • Hypothyroidism, unspecified    • Idiopathic gout, unspecified site    • Impaired fasting glucose    • Mixed hyperlipidemia    • Pure hypercholesterolemia    • Type 2 diabetes mellitus with diabetic chronic kidney disease (HCC)          SURGICALHX  Past Surgical History:   Procedure Laterality Date   • LARYNGECTOMY     • SKIN CANCER EXCISION      ON NOSE, BILATERAL EAR - left ear 3/20   • TUMOR EXCISION      FROM CHEST        SOC  Social History     Socioeconomic History   • Marital status:    Tobacco Use   • Smoking status: Former Smoker     Packs/day: 2.00     Types:  Cigarettes     Quit date:      Years since quittin.1   • Smokeless tobacco: Never Used   Vaping Use   • Vaping Use: Never used   Substance and Sexual Activity   • Alcohol use: Yes     Comment: OCC   • Drug use: Never   • Sexual activity: Defer         FAMHX  Family History   Problem Relation Age of Onset   • No Known Problems Mother    • No Known Problems Father           ALLERGY  No Known Allergies     MEDSCURRENT    Current Outpatient Medications:   •  allopurinol (ZYLOPRIM) 100 MG tablet, TAKE 1 TABLET DAILY (Patient taking differently: Take 100 mg by mouth Daily.), Disp: 90 tablet, Rfl: 1  •  apixaban (ELIQUIS) 2.5 MG tablet tablet, Take 1 tablet by mouth Every 12 (Twelve) Hours. Indications: Atrial Fibrillation, Disp: 60 tablet, Rfl: 5  •  carvedilol (COREG) 12.5 MG tablet, Take 1 tablet by mouth 2 (Two) Times a Day With Meals., Disp: 60 tablet, Rfl: 5  •  famotidine (PEPCID) 20 MG tablet, Take 1 tablet by mouth Every Night., Disp: 90 tablet, Rfl: 1  •  furosemide (LASIX) 40 MG tablet, Take 1 tablet by mouth 2 (Two) Times a Day., Disp: 180 tablet, Rfl: 1  •  levothyroxine (SYNTHROID, LEVOTHROID) 100 MCG tablet, TAKE 1 TABLET DAILY (Patient taking differently: Take 100 mcg by mouth Daily.), Disp: 90 tablet, Rfl: 1  •  lisinopril (PRINIVIL,ZESTRIL) 20 MG tablet, Take 1 tablet by mouth Daily., Disp: 90 tablet, Rfl: 1  •  potassium chloride (MICRO-K) 10 MEQ CR capsule, Take 2 capsules by mouth 2 (Two) Times a Day., Disp: 180 capsule, Rfl: 1  •  pravastatin (PRAVACHOL) 20 MG tablet, Take 20 mg by mouth Daily., Disp: , Rfl:       Review of Systems   Constitutional: Negative.   HENT: Negative.    Eyes: Negative.    Cardiovascular: Positive for dyspnea on exertion. Negative for chest pain.   Respiratory: Positive for shortness of breath.    Endocrine: Negative.    Hematologic/Lymphatic: Negative.    Skin: Negative.    Musculoskeletal: Negative.    Gastrointestinal: Negative.    Genitourinary: Negative.   "  Neurological: Negative.    Psychiatric/Behavioral: Negative.         Objective     /68   Pulse 77   Ht 165.1 cm (65\")   Wt 74.8 kg (165 lb)   BMI 27.46 kg/m²       General Appearance:   · well developed  · well nourished  HENT:   · oropharynx moist  · lips not cyanotic  Neck:  · thyroid not enlarged  · supple  Respiratory:  · no respiratory distress  · normal breath sounds  · no rales  Cardiovascular:  · no jugular venous distention  · regular rhythm  · apical impulse normal  · S1 normal, S2 normal  · no S3, no S4   · no murmur  · no rub, no thrill  · carotid pulses normal; no bruit  · pedal pulses normal  · lower extremity edema: none    Musculoskeletal:  · no clubbing of fingers.   · normocephalic, head atraumatic  Skin:   · warm, dry  Psychiatric:  · judgement and insight appropriate  · normal mood and affect      Result Review :     The following data was reviewed by: Don Guzman MD on 03/04/2022:    CMP    CMP 2/8/22 2/9/22 2/21/22   Glucose 125 (A) 113 (A) 127 (A)   BUN 31 (A) 32 (A) 18   Creatinine 1.52 (A) 1.51 (A) 1.57 (A)   eGFR Non African Am 44 (A) 45 (A) 43 (A)   Sodium 133 (A) 132 (A) 139   Potassium 4.7 4.5 4.6   Chloride 92 (A) 93 (A) 99   Calcium 9.9 9.5 9.4   (A) Abnormal value       Comments are available for some flowsheets but are not being displayed.           CBC    CBC 1/31/22 2/6/22 2/7/22   WBC 8.21 5.68 6.02   RBC 5.10 4.68 4.80   Hemoglobin 16.3 15.0 15.0   Hematocrit 49.9 44.6 44.2   MCV 97.8 (A) 95.3 92.1   MCH 32.0 32.1 31.3   MCHC 32.7 33.6 33.9   RDW 16.8 (A) 15.9 (A) 15.7 (A)   Platelets 142 137 (A) 132 (A)   (A) Abnormal value            Lipid Panel    Lipid Panel 2/2/22   Total Cholesterol 121   Triglycerides 142   HDL Cholesterol 50   VLDL Cholesterol 24   LDL Cholesterol  47   LDL/HDL Ratio 0.85                 Data reviewed: Echo reviewed October, primary care records reviewed, EKG shows atrial fibrillation with no acute ST changes "     Procedures             Assessment and Plan        ASSESSMENT:  Encounter Diagnoses   Name Primary?   • Coronary artery disease involving native coronary artery of native heart without angina pectoris Yes   • Chronic diastolic congestive heart failure (HCC)    • Hypertension, essential    • Longstanding persistent atrial fibrillation (HCC)          PLAN:    1.  Coronary artery disease is stable, he is not having angina currently.  It sounds like from his previous description he had a chronic total occlusion of an artery on a catheterization about 18 years ago.  Continue current medical therapy  2.  Since resuming diuretics his chronic diastolic heart failure is clinically stable, blood pressure controlled today, continue current regimen.  3.  Longstanding atrial fibrillation, continue anticoagulation for stroke prevention    At this time routine follow-up will be arranged, if he has progression of his symptoms we can consider stress imaging at that point.          Patient was given instructions and counseling regarding his condition or for health maintenance advice. Please see specific information pulled into the AVS if appropriate.             FREDERICK Guzman MD  3/4/2022    13:19 EST

## 2022-03-10 NOTE — TELEPHONE ENCOUNTER
03/07/2022:  Left message for patient to return to call regarding thoracentesis. [phone].  03/10/2022 10:10:  Left message for patient to call regarding thoracentesis. [phone] and [phone].

## 2022-03-24 NOTE — ASSESSMENT & PLAN NOTE
BMP is holding at 6.8K as of 3/22 OV.  His creatinine is up to 1.7, so we will hold with current level of diuretic.  Of note, patient symptoms and oxygenation has significantly improved after repeat thoracentesis as outpatient.  So we need to stick with current level of diuretic, along with ACE inhibitor and beta-blocker.

## 2022-03-24 NOTE — ASSESSMENT & PLAN NOTE
Patient still with intermittent hypotensive episodes at home, had one just this morning, but is improved this afternoon.  We lowered his lisinopril last month to allow room for diuresis.  Additionally he is just on moderate dose carvedilol for A. fib.  Patient to call if he has frequent hypotensive episodes at home.

## 2022-03-24 NOTE — ASSESSMENT & PLAN NOTE
GFR is holding fairly well at 40 as of his 3/22 office visit.  We will continue with close monitoring of his electrolytes etc.

## 2022-03-24 NOTE — PROGRESS NOTES
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  A/C diastolic heart failure.  BNP down from 17 K to 4 K at d/c.  2.  Persistent A. fib.  Switch from atenolol to Coreg, Eliquis resumed.  3.  A/C Hypoxic Resp Failure. Overnight results are worrisome for possible DORA.  Pt desaturated on room air and was placed on 28% aerosol trach collar at 2358. Despite oxygen use, Mr Jackson experienced 67 desaturations throughout the study totaling 2 hours and 8 min.  The deepest desaturation value was 78%.  RT CM reported the findings to Dr Duffy and TINA Briones, and referral to sleep lab was made.  Home oxygen orders placed.  Resting oxygen requirements are 28%, ambulating requirements are 31%, sleep requirements are yet to be determined.  4.  R Pleural Effusion. s/p tap 2/2 and had 1.9 L removed; it was negative for any obvious infection and for malignancy---> bedside ultrasound performed by Dr. Duffy just prior to discharge revealed at least partial reaccumulation of the fluid.  We will continue with attempts at aggressive diuresis, and have him follow-up with pulmonary as an outpatient.  5.  Hypertension. = no room to titrate beta-blocker and will defer Entresto to cardiology = we will get him into see Dr. Guzman.    ---> and who is coming in 3/22 for close f/u just a month out from last appt.      Review of Systems   Constitutional: Negative for  "appetite change, fatigue and fever.   HENT: Negative for congestion and ear pain.    Eyes: Negative for blurred vision.   Respiratory: Negative for cough, chest tightness, shortness of breath and wheezing.    Cardiovascular: Negative for chest pain, palpitations and leg swelling.   Gastrointestinal: Negative for abdominal pain.   Genitourinary: Negative for difficulty urinating, dysuria and hematuria.   Musculoskeletal: Negative for arthralgias and gait problem.   Skin: Negative for skin lesions.   Neurological: Negative for syncope, memory problem and confusion.   Psychiatric/Behavioral: Negative for self-injury and depressed mood.       Objective   Vital Signs:   /78   Pulse 67   Temp 97.3 °F (36.3 °C)   Ht 165.1 cm (65\")   Wt 72 kg (158 lb 12.8 oz)   SpO2 98%   BMI 26.43 kg/m²           Physical Exam  Vitals and nursing note reviewed.   Constitutional:       General: He is not in acute distress.     Appearance: Normal appearance. He is not toxic-appearing.   HENT:      Head: Atraumatic.      Right Ear: External ear normal.      Left Ear: External ear normal.      Nose: Nose normal.      Mouth/Throat:      Mouth: Mucous membranes are moist.   Eyes:      General:         Right eye: No discharge.         Left eye: No discharge.      Extraocular Movements: Extraocular movements intact.      Pupils: Pupils are equal, round, and reactive to light.   Neck:      Comments: Chronic trach.  Cardiovascular:      Rate and Rhythm: Normal rate. Rhythm irregular.      Pulses: Normal pulses.      Heart sounds: Normal heart sounds. No murmur heard.    No gallop.      Comments: Irregularly irregular, no S3.  Pulmonary:      Effort: Pulmonary effort is normal. No respiratory distress.      Breath sounds: No wheezing, rhonchi or rales.      Comments: Patient with decreased breath sounds on the right, dullness about correction up.  No labored respirations.---> Significantly improved breath sounds noted bilaterally as of " 3/22.  Abdominal:      General: There is no distension.      Palpations: Abdomen is soft. There is no mass.      Tenderness: There is no abdominal tenderness. There is no guarding.   Musculoskeletal:         General: No swelling or tenderness.      Cervical back: No tenderness.      Right lower leg: Edema present.      Left lower leg: Edema present.      Comments: Patient with 2+ edema on the left, 1+ on the right, extending to about mid calf.---> Patient with trace edema only as of 3/22 office visit.   Skin:     General: Skin is warm and dry.      Findings: No rash.   Neurological:      General: No focal deficit present.      Mental Status: He is alert and oriented to person, place, and time. Mental status is at baseline.      Motor: No weakness.      Gait: Gait normal.   Psychiatric:         Mood and Affect: Mood normal.         Thought Content: Thought content normal.          Result Review :   The following data was reviewed by: Erick Pedroza MD on 09/03/2021:           Assessment and Plan    Diagnoses and all orders for this visit:    1. Stage 3b chronic kidney disease (HCC) (Primary)  Assessment & Plan:  GFR is holding fairly well at 40 as of his 3/22 office visit.  We will continue with close monitoring of his electrolytes etc.      2. Pulmonary hypertension (HCC)  Assessment & Plan:  As noted, oxygenation improved dramatically after recent thoracentesis, appreciate care and follow-up per pulmonology.      3. Persistent atrial fibrillation (HCC)  Overview:  This appears to be a new issue = yes, EKG done today 9/3/2021 to evaluate the patient's irregular heartbeat noted on exam confirms this.     Assessment & Plan:  Patient's rate remains well controlled with moderate dose carvedilol as of his 3/22 office visit.  Additionally he is tolerating low-dose Eliquis for anticoagulation, continue same, along with follow-up by Dr. Guzman.      4. Mixed hyperlipidemia  Assessment & Plan:  LDL of 123 as of his 3/22  office visit is nowhere near goal given his underlying CHF and A. fib now.  We will transition him from pravastatin to atorvastatin now.      5. Hypothyroidism due to acquired atrophy of thyroid  Assessment & Plan:  TSH was 5.9 in 3/22, this is obviously just low normal, will stick with 100 mcg of Synthroid for now given his underlying A. fib.    Orders:  -     TSH; Future    6. Acute on chronic respiratory failure with hypoxia (HCC)  Assessment & Plan:  Patient sats have improved to the mid 90s on room air as of his 3/22 office visit.  He just had repeat thoracentesis done as an outpatient and has had significant improvement.  He is still using the trach collar at night, that certainly seems appropriate, continue current plan of care.    Orders:  -     Basic Metabolic Panel; Future  -     Basic Metabolic Panel; Future    7. Acute diastolic CHF (congestive heart failure) (HCC)  Overview:  This is a new issue as of his September 2021 appointment.  Echo 10/21 with normal EF, no concerning valve changes, he does have significant pulmonary hypertension.      Assessment & Plan:  BMP is holding at 6.8K as of 3/22 OV.  His creatinine is up to 1.7, so we will hold with current level of diuretic.  Of note, patient symptoms and oxygenation has significantly improved after repeat thoracentesis as outpatient.  So we need to stick with current level of diuretic, along with ACE inhibitor and beta-blocker.    Orders:  -     BNP; Future  -     BNP; Future    8. Essential hypertension  Assessment & Plan:  Patient still with intermittent hypotensive episodes at home, had one just this morning, but is improved this afternoon.  We lowered his lisinopril last month to allow room for diuresis.  Additionally he is just on moderate dose carvedilol for A. fib.  Patient to call if he has frequent hypotensive episodes at home.      Other orders  -     allopurinol (ZYLOPRIM) 100 MG tablet; Take 1 tablet by mouth Daily.  Dispense: 90 tablet;  Refill: 1  -     apixaban (ELIQUIS) 2.5 MG tablet tablet; Take 1 tablet by mouth Every 12 (Twelve) Hours. Indications: Atrial Fibrillation  Dispense: 180 tablet; Refill: 1  -     carvedilol (COREG) 12.5 MG tablet; Take 1 tablet by mouth 2 (Two) Times a Day With Meals.  Dispense: 180 tablet; Refill: 1  -     furosemide (LASIX) 40 MG tablet; Take 1 tablet by mouth 2 (Two) Times a Day.  Dispense: 180 tablet; Refill: 1  -     levothyroxine (SYNTHROID, LEVOTHROID) 100 MCG tablet; Take 1 tablet by mouth Daily.  Dispense: 90 tablet; Refill: 1  -     lisinopril (PRINIVIL,ZESTRIL) 20 MG tablet; Take 1 tablet by mouth Daily.  Dispense: 90 tablet; Refill: 1  -     famotidine (PEPCID) 20 MG tablet; Take 1 tablet by mouth Every Night.  Dispense: 90 tablet; Refill: 1  -     potassium chloride (MICRO-K) 10 MEQ CR capsule; Take 2 capsules by mouth 2 (Two) Times a Day.  Dispense: 360 capsule; Refill: 1  -     atorvastatin (Lipitor) 20 MG tablet; Take 1 tablet by mouth Every Night.  Dispense: 90 tablet; Refill: 1    --  --  OLDER NOTES:  (D/W HIM ON RTO GERD/STOMACH ISSUES THAT WIFE D/W ME ON MONDAY---> I d/w him 3/21 and he denies it; will repeat CBC on RTO)  --I have called pharmacies in town and they have no record of Prevnar, I have called pt's wife and she feels that he has not received it yet. MB---> I will address this after Covid shots done.    VISIT 6/21:  ANNUAL MEDICARE WELLNESS PHYSICAL 8/20 OV=forms reviewed in room with pt; no new issues raised.  --  HTN well controlled...but f/u off HCTZ...as above; will try increasing ACEI, but may need another agent on RTO...improved...up and down at home...pt here a year later and needs increase meds 8/20...some better 11/20 despite intol to Norvasc 5 mg=edema; will use another later if BP trends higher... stable 3/21---> is up 6/21, so increase meds if same on RTO.  CKD3 stable...35/1.4 (59%)...60/2.0, not drinking as well, some loose stools, no pains/no hematuria/etc, so will just  lose the HCTZ for now...25/1.3...43/1.7 is likely related to being dry, so ok to stay on low dose nsaid=1/2 of 600 mg, but will check sooner...26/1.3 is back to baseline...54%...49%...53%...42% is after not being seen past year as of 8/20 OV...50% is nice to see 11/20...43% is ok for now 3/21---> 34% = still looks dry and I d/w him in detail need to see Renal if no better on RTO; I d/w stop nsaids please.  --  DM well controlled and he's comfortable being on actos...remains well controlled...6.1...5.7 is ok since no lows, no sweats/etc = ditto...same=5.8 with no lows as of 8/20 OV...5.5 and will stop actos now as of 11/20...5.6 off it is great---> 6.1 is fine 6/21.  THYROID stable on 75 qd...stable 9/16 = 2.6...7.2, so increase to 100...1.9 is great...fine 9/18...wnl 4/19---> fine 6/21.  --  CAD with no ischemia...appears stable, but does have some fatigue c/o at 3/18 OV, so will consider SPECT if persists---> no CP/SOB 8/20.  LIPIDS at goal=LDL 78---> 97 is fine 6/21.  --  H/N CA (9/08) s/p laryngectomy and XRT...per Dr Maya q 6 mo.  SKIN CANCER=RUE per Dr Maya; had PET for this and above as of 4/17 OV=neg per report---> seeing Dr Rivera q 2-3 months as of 8/19 OV.  --  GOUT with no recent flare...6.5...6.4---> 6 in 11/20.  VIT B12 DEF=9/18 = 1K OTC to start---> 1500.  --  --  PSA 0.4 on 4/17/17.  COLON not rec given age and no sx's.  Havrix-Hep A 8/18, Shingles 2019; COVID x1 as of 6/21 OV=Pfizer.  Prevnar rec 9/17; rec Pneumovax if he got prevnar as of 1/19 OV, but he didn't, so go get it now...he can't say 4/19 = we will call; d/w COVID shot needed 3/21.  Flu shot for 2021 season given at his 10/21 office visit.  ([place] scared about being on water alone=trach; Jade/Jamison both here in town).    Follow Up   Return in about 6 weeks (around 5/5/2022).  Patient was given instructions and counseling regarding his condition or for health maintenance advice. Please see specific information  pulled into the AVS if appropriate.

## 2022-03-24 NOTE — ASSESSMENT & PLAN NOTE
Patient sats have improved to the mid 90s on room air as of his 3/22 office visit.  He just had repeat thoracentesis done as an outpatient and has had significant improvement.  He is still using the trach collar at night, that certainly seems appropriate, continue current plan of care.

## 2022-03-24 NOTE — ASSESSMENT & PLAN NOTE
As noted, oxygenation improved dramatically after recent thoracentesis, appreciate care and follow-up per pulmonology.

## 2022-03-24 NOTE — ASSESSMENT & PLAN NOTE
LDL of 123 as of his 3/22 office visit is nowhere near goal given his underlying CHF and A. fib now.  We will transition him from pravastatin to atorvastatin now.

## 2022-03-24 NOTE — PATIENT INSTRUCTIONS
1.  BMP, BNP in 3 weeks and call for results.    2.  BMP, BNP, and TSH in 6 weeks with appointment.

## 2022-03-24 NOTE — ASSESSMENT & PLAN NOTE
TSH was 5.9 in 3/22, this is obviously just low normal, will stick with 100 mcg of Synthroid for now given his underlying A. fib.

## 2022-03-24 NOTE — ASSESSMENT & PLAN NOTE
Patient's rate remains well controlled with moderate dose carvedilol as of his 3/22 office visit.  Additionally he is tolerating low-dose Eliquis for anticoagulation, continue same, along with follow-up by Dr. Guzman.

## 2022-03-30 NOTE — TELEPHONE ENCOUNTER
Patient came by the office wanting to know if the referral to [facility] had been put in for his hand. I didn't see where this was address at his visit. Call back number [phone].

## 2022-03-30 NOTE — TELEPHONE ENCOUNTER
"Not sure if you can still see it, but in the checkout area, right under where I put in when the patient is to return, is the box that I have been using to relay messages for the checkout people to address.  The following was included in that box:    \"Please schedule appointment to be evaluated by [facility] in regards to left hand pain and deformity.\"    Please investigate why this was not followed up on, and please ensure that they are checking that box and addressing the items that are included therein.    Specifically, the patient I just finished seeing, Mrs Schilling, there were several items left in that box to be addressed as she was exiting.  Thanks.  "

## 2022-04-27 NOTE — PROGRESS NOTES
[facility] - PODIATRY    Today's Date: 04/27/22    Patient Name: Tres Jackson  MRN: [id number]  CSN: [id number]  PCP: Erick Pedroza MD, Last PCP Visit: 24 March 2022  Referring Provider: Referring, Self    SUBJECTIVE     Chief Complaint   Patient presents with   • Left Foot - Annual Exam, Diabetes, Nail Problem   • Right Foot - Annual Exam, Diabetes, Nail Problem     HPI: Tres Jackson, a 81 y.o.male, presents to clinic for painful toenail and a diabetic foot evaluation.    New, Established, New Problem:  New  Location:  Toenails  Duration:   Greater than five years  Onset:  Gradual  Nature:  sore with palpation.  Stable, worsening, improving:   Worsening  Aggravating factors:  Pain with shoe gear and ambulation.  Previous Treatment: Unable to trim their own toenails.    Patient controlling diabetes via: Oral medication    Patient states there last blood glucose was: 120    Patient denies any fevers, chills, nausea, vomiting, shortness of breath, nor any other constitutional signs nor symptoms.    No other pedal complaints at this time.    Past Medical History:   Diagnosis Date   • Atherosclerotic 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 pectoris    • Atrophy of thyroid (acquired)    • Cancer (HCC)     HTM Cancer Dx 2007   • Chronic fatigue, unspecified    • Chronic kidney disease, stage III (moderate) (HCC)    • Dysuria    • Essential (primary) hypertension    • Heart disease    • Hx of radiation therapy     XRT   • Hypothyroidism, unspecified    • Idiopathic gout, unspecified site    • Impaired fasting glucose    • Mixed hyperlipidemia    • Pure hypercholesterolemia    • Type 2 diabetes mellitus with diabetic chronic kidney disease (HCC)      Past Surgical History:   Procedure Laterality Date   • LARYNGECTOMY     • SKIN CANCER EXCISION      ON NOSE, BILATERAL EAR - left ear 3/20   • TUMOR EXCISION      FROM CHEST     Family History   Problem Relation Age of Onset   • No  Known Problems Mother    • No Known Problems Father      Social History     Socioeconomic History   • Marital status:    Tobacco Use   • Smoking status: Former Smoker     Packs/day: 2.00     Types: Cigarettes     Quit date:      Years since quittin.3   • Smokeless tobacco: Never Used   Vaping Use   • Vaping Use: Never used   Substance and Sexual Activity   • Alcohol use: Yes     Comment: OCC   • Drug use: Never   • Sexual activity: Defer     No Known Allergies  Current Outpatient Medications   Medication Sig Dispense Refill   • allopurinol (ZYLOPRIM) 100 MG tablet Take 1 tablet by mouth Daily. 90 tablet 1   • apixaban (ELIQUIS) 2.5 MG tablet tablet Take 1 tablet by mouth Every 12 (Twelve) Hours. Indications: Atrial Fibrillation 180 tablet 1   • atorvastatin (Lipitor) 20 MG tablet Take 1 tablet by mouth Every Night. 90 tablet 1   • carvedilol (COREG) 12.5 MG tablet Take 1 tablet by mouth 2 (Two) Times a Day With Meals. 180 tablet 1   • famotidine (PEPCID) 20 MG tablet Take 1 tablet by mouth Every Night. 90 tablet 1   • furosemide (LASIX) 40 MG tablet Take 1 tablet by mouth 2 (Two) Times a Day. 180 tablet 1   • levothyroxine (SYNTHROID, LEVOTHROID) 100 MCG tablet Take 1 tablet by mouth Daily. 90 tablet 1   • lisinopril (PRINIVIL,ZESTRIL) 20 MG tablet Take 1 tablet by mouth Daily. 90 tablet 1   • potassium chloride (MICRO-K) 10 MEQ CR capsule Take 2 capsules by mouth 2 (Two) Times a Day. 360 capsule 1     No current facility-administered medications for this visit.     Review of Systems   Constitutional: Negative.    Skin:        Painful toenails.   All other systems reviewed and are negative.      OBJECTIVE     Vitals:    22 1326   BP: 134/70   Pulse: 73   Temp: 97.5 °F (36.4 °C)   SpO2: 94%       Body mass index is 26.29 kg/m².    Lab Results   Component Value Date    HGBA1C 5.70 (H) 2022       Lab Results   Component Value Date    GLUCOSE 112 (H) 2022    CALCIUM 9.7 2022    NA  141 04/14/2022    K 4.3 04/14/2022    CO2 31.4 (H) 04/14/2022     04/14/2022    BUN 28 (H) 04/14/2022    CREATININE 2.00 (H) 04/14/2022    EGFRIFNONA 43 (L) 02/21/2022    BCR 14.0 04/14/2022    ANIONGAP 9.6 04/14/2022       Patient seen in no apparent distress.      PHYSICAL EXAM:     Foot/Ankle Exam:       General:   Diabetic Foot Exam Performed    Appearance: elderly    Appearance comment:  Chronically ill  Orientation: AAOx3    Affect: appropriate    Gait: unimpaired    Shoe Gear:  Casual shoes    VASCULAR      Right Foot Vascularity   Normal vascular exam    Dorsalis pedis:  1+  Posterior tibial:  1+  Skin Temperature: cool    Edema Grading:  None  CFT:  < 3 seconds  Pedal Hair Growth:  Absent  Varicosities: mild varicosities       Left Foot Vascularity   Normal vascular exam    Dorsalis pedis:  1+  Posterior tibial:  1+  Skin Temperature: cool    Edema Grading:  None  CFT:  3  Pedal Hair Growth:  Absent  Varicosities: mild varicosities        NEUROLOGIC     Right Foot Neurologic   Normal sensation    Light touch sensation:  Normal  Vibratory sensation:  Normal  Hot/Cold sensation: normal    Protective Sensation using [place] [employer]-Vinayak Monofilament:  10     Left Foot Neurologic   Normal sensation    Light touch sensation:  Normal  Vibratory sensation:  Normal  Hot/cold sensation: normal    Protective Sensation using [place]-Vinayak Monofilament:  10     MUSCLE STRENGTH     Right Foot Muscle Strength   Foot dorsiflexion:  4  Foot plantar flexion:  4  Foot inversion:  4  Foot eversion:  4     Left Foot Muscle Strength   Foot dorsiflexion:  4  Foot plantar flexion:  4  Foot inversion:  4  Foot eversion:  4     RANGE OF MOTION      Right Foot Range of Motion   Foot and ankle ROM within normal limits       Left Foot Range of Motion   Foot and ankle ROM within normal limits       DERMATOLOGIC     Right Foot Dermatologic   Skin: skin intact    Nails: onychomycosis, abnormally thick, subungual debris, dystrophic  nails and ingrown toenail    Nails comment:  Toenails 1, 2, 3, 4     Left Foot Dermatologic   Skin: skin intact    Nails: onychomycosis, abnormally thick, subungual debris, dystrophic nails and ingrown toenail    Nails comment:  Toenails 1, 3, 4, and 5      Diabetic Foot Exam Performed      ASSESSMENT/PLAN     Diagnoses and all orders for this visit:    1. Foot pain, bilateral (Primary)    2. Onychomycosis    3. Onychocryptosis    4. Diabetic foot (HCC)    5. Non-insulin dependent type 2 diabetes mellitus (HCC)        Comprehensive lower extremity examination and evaluation was performed.    Discussed findings and treatment plan including risks, benefits, and treatment options with patient in detail. Patient agreed with treatment plan.    Medications and allergies reviewed.  Reviewed available blood glucose and HgB A1C lab values along with other pertinent labs.  These were discussed with the patient as to their importance of diabetic maintenance.    Toenails 1, 2, 3, 4 on Right and 1, 3, 4, 5 on Left were debrided with nail nippers then filed with a Nitishmel nail jeannine.  Patient tolerated procedure well without complications.    Diabetic foot exam performed and documented this date, compliant with CQM required standards. Detail of findings as noted in physical exam.  Lower extremity Neurologic exam for diabetic patient performed and documented this date, compliant with PQRS required standards. Detail of findings as noted in physical exam.  Advised patient importance of good routine lower extremity hygiene. Advised patient importance of evaluating for intact skin and pain free nail borders.  Advised patient to use mirror to evaluate plantar/ soles of feet for better visualization. Advised patient monitor and phone office to be seen if any cracking to skin, open lesions, painful nail borders or if nails become elongated prior to next visit. Advised patient importance of daily cleansing of lower extremities, followed  by good skin cream to maintain normal hydration of skin. Also advised patient importance of close daily monitoring of blood sugar. Advised to regulate diet and medications to maintain control of blood sugar in optimal range. Contact primary care provider if difficulties maintaining blood sugar levels.  Advised Patient of presence of Diabetes Mellitus condition.  Advised Patient risk of progression and worsening or improvement, then return of condition.  Will monitor condition for any change in future. Treat with most appropriate treatment pending status of condition.  Counseled and advised patient extensively on nature and ramifications of diabetes. Standard instructions given to patient for good diabetic foot care and maintenance. Advised importance of careful monitoring to avoid break down and complications secondary to diabetes. Advised patient importance of strict maintenance of blood sugar control. Advised patient of possible ominous results from neglect of condition, i.e.: amputation/ loss of digits, feet and legs, or even death.  Patient states understands counseling, will monitor closely, continue good hygiene and routine diabetic foot care. Patient will contact office is questions or problems.      An After Visit Summary was printed and given to the patient at discharge, including (if requested) any available informative/educational handouts regarding diagnosis, treatment, or medications. All questions were answered to patient/family satisfaction. Should symptoms fail to improve or worsen they agree to call or return to clinic or to go to the Emergency Department. Discussed the importance of following up with any needed screening tests/labs/specialist appointments and any requested follow-up recommended by me today. Importance of maintaining follow-up discussed and patient accepts that missed appointments can delay diagnosis and potentially lead to worsening of conditions.    Return in about 9 weeks (around  6/29/2022) for Toenail Care., or sooner if acute issues arise.    This document has been electronically signed by Ronan Narayan DPM on April 27, 2022 14:02 EDT

## 2022-05-10 NOTE — PATIENT INSTRUCTIONS
Lower lisinopril to 10 mg, just take one half of the 20 mg tablet for now.    2.  Lower Lasix to 40 mg once daily.    3.  Lower potassium to 20 mEq once daily.    4.  You have orders for a BMP and a BNP nonfasting in about a month, please call for the results.    5.  You have fasting blood test ordered to be done in about 3 months with an appointment.    6.  Please call if you are blood pressure does not come back up after the above changes, and also call if the blood pressure goes too far up, and/or if fluid starts building back up.

## 2022-05-10 NOTE — ASSESSMENT & PLAN NOTE
Patient significantly hypotensive as of his 5/22 office visit.  We will lower lisinopril to once daily as well lower his Lasix to once daily for the time being.

## 2022-05-10 NOTE — PROGRESS NOTES
Chief Complaint  Hyperlipidemia (Pt is here for routine follow up. Pt has no new issues or concerns.)    Subjective          Tres Stalin Jackson presents to 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  A/C diastolic heart failure.  BNP down from 17 K to 4 K at d/c.  2.  Persistent A. fib.  Switch from atenolol to Coreg, Eliquis resumed.  3.  A/C Hypoxic Resp Failure. Overnight results are worrisome for possible DORA.  Pt desaturated on room air and was placed on 28% aerosol trach collar at 2358. Despite oxygen use, Mr Jackson experienced 67 desaturations throughout the study totaling 2 hours and 8 min.  The deepest desaturation value was 78%.  RT CM reported the findings to Dr Duffy and TINA Briones, and referral to sleep lab was made.  Home oxygen orders placed.  Resting oxygen requirements are 28%, ambulating requirements are 31%, sleep requirements are yet to be determined.  4.  R Pleural Effusion. s/p tap 2/2 and had 1.9 L removed; it was negative for any obvious infection and for malignancy---> bedside ultrasound performed by Dr. Duffy just prior to discharge revealed at least partial reaccumulation of the fluid.  We will continue with attempts at aggressive diuresis, and have him follow-up with pulmonary as an outpatient.  5.  Hypertension. = no room to titrate beta-blocker and will defer Entresto to cardiology = we will get him into see Dr. Guzman.    ---> and who is coming in 5/22 for a 6 week f/u I believe.    Review of Systems   Constitutional: Negative for appetite change, fatigue and fever.   HENT: Negative for congestion and ear pain.    Eyes: Negative  "for blurred vision.   Respiratory: Negative for cough, chest tightness, shortness of breath and wheezing.    Cardiovascular: Negative for chest pain, palpitations and leg swelling.   Gastrointestinal: Negative for abdominal pain.   Genitourinary: Negative for difficulty urinating, dysuria and hematuria.   Musculoskeletal: Negative for arthralgias and gait problem.   Skin: Negative for skin lesions.   Neurological: Negative for syncope, memory problem and confusion.   Psychiatric/Behavioral: Negative for self-injury and depressed mood.       Objective   Vital Signs:   BP (!) 81/51   Pulse 66   Temp 97.3 °F (36.3 °C)   Ht 165.1 cm (65\")   Wt 70.7 kg (155 lb 12.8 oz)   SpO2 90%   BMI 25.93 kg/m²           Physical Exam  Vitals and nursing note reviewed.   Constitutional:       General: He is not in acute distress.     Appearance: Normal appearance. He is not toxic-appearing.   HENT:      Head: Atraumatic.      Right Ear: External ear normal.      Left Ear: External ear normal.      Nose: Nose normal.      Mouth/Throat:      Mouth: Mucous membranes are moist.   Eyes:      General:         Right eye: No discharge.         Left eye: No discharge.      Extraocular Movements: Extraocular movements intact.      Pupils: Pupils are equal, round, and reactive to light.   Neck:      Comments: Chronic trach.  Cardiovascular:      Rate and Rhythm: Normal rate. Rhythm irregular.      Pulses: Normal pulses.      Heart sounds: Normal heart sounds. No murmur heard.    No gallop.      Comments: Irregularly irregular, no S3.  Pulmonary:      Effort: Pulmonary effort is normal. No respiratory distress.      Breath sounds: No wheezing, rhonchi or rales.      Comments: Patient with decreased breath sounds on the right, dullness about MCC up.  No labored respirations.---> Significantly improved breath sounds noted bilaterally as of 3/22.  Abdominal:      General: There is no distension.      Palpations: Abdomen is soft. There is " no mass.      Tenderness: There is no abdominal tenderness. There is no guarding.   Musculoskeletal:         General: No swelling or tenderness.      Cervical back: No tenderness.      Right lower leg: Edema present.      Left lower leg: Edema present.      Comments: Patient with 2+ edema on the left, 1+ on the right, extending to about mid calf.---> Patient with trace edema only as of 3/22 office visit.   Skin:     General: Skin is warm and dry.      Findings: No rash.   Neurological:      General: No focal deficit present.      Mental Status: He is alert and oriented to person, place, and time. Mental status is at baseline.      Motor: No weakness.      Gait: Gait normal.   Psychiatric:         Mood and Affect: Mood normal.         Thought Content: Thought content normal.          Result Review :   The following data was reviewed by: Erick Pedroza MD on 09/03/2021:           Assessment and Plan    Diagnoses and all orders for this visit:    1. Essential hypertension (Primary)  -     Comprehensive Metabolic Panel; Future    2. Stage 3b chronic kidney disease (HCC)  Assessment & Plan:  GFR is down from 40 to 30 as of his 5/22 office visit.  Blood pressure is low as well.  BNP is up, but it seems to be related to the drop in his renal function.  Will need follow-up on this in couple of weeks, we will back off on Lasix as well as lisinopril some.    Orders:  -     Basic Metabolic Panel; Future    3. Persistent atrial fibrillation (HCC)  Overview:  This appears to be a new issue = yes, EKG done today 9/3/2021 to evaluate the patient's irregular heartbeat noted on exam confirms this.     Assessment & Plan:  Patient is in controlled A. fib, heart rates in the 60s.  He is stable on moderate dose carvedilol along with Eliquis for anticoagulation.      4. Acute diastolic CHF (congestive heart failure) (HCC)  Overview:  This is a new issue as of his September 2021 appointment.  Echo 10/21 with normal EF, no concerning valve  changes, he does have significant pulmonary hypertension.      Assessment & Plan:  BNP is up to 9.1K as of his 5/22 office visit.  But his volume appears stable on exam, no significant edema, sats are fine.  He is having no increased dyspnea.  Believe the BNP elevation is related to bump in his renal function, he appears dry, he is hypotensive.  We will lower his Lasix to once daily for now, also lower his lisinopril to half a pill for now.  Additionally, will lower potassium to once daily.    Orders:  -     BNP; Future  -     BNP; Future    5. Acute on chronic respiratory failure with hypoxia (HCC)  Assessment & Plan:  Sats are only 90% on room air today 5/22, but he is running in the 9394 range at home.  He still wears a trach collar at night.  Issues appear stable this regard.      6. Mixed hyperlipidemia  -     Lipid Panel; Future    7. Hypothyroidism due to acquired atrophy of thyroid  Assessment & Plan:  TSH is down from 5.9-0.8 as of his 5/22 office visit.  This is without any changes in his dosing since the last level.  Patient stable to continue 100 mcg Synthroid daily.    Orders:  -     TSH+Free T4; Future    Other orders  -     lisinopril (PRINIVIL,ZESTRIL) 20 MG tablet; Take 0.5 tablets by mouth Daily.  Dispense: 90 tablet; Refill: 1  -     Discontinue: furosemide (LASIX) 40 MG tablet; Take 1 tablet by mouth Daily.  Dispense: 180 tablet; Refill: 1  -     furosemide (LASIX) 40 MG tablet; Take 1 tablet by mouth Daily.  Dispense: 90 tablet; Refill: 1  -     potassium chloride (MICRO-K) 10 MEQ CR capsule; Take 2 capsules by mouth Daily.  Dispense: 180 capsule; Refill: 1    --  --  OLDER NOTES:  (D/W HIM ON RTO GERD/STOMACH ISSUES THAT WIFE D/W ME ON MONDAY---> I d/w him 3/21 and he denies it; will repeat CBC on RTO)  --I have called pharmacies in town and they have no record of Prevnar, I have called pt's wife and she feels that he has not received it yet. MB---> I will address this after Covid shots  done.    VISIT 6/21:  ANNUAL MEDICARE WELLNESS PHYSICAL 8/20 OV=forms reviewed in room with pt; no new issues raised.  --  HTN well controlled...but f/u off HCTZ...as above; will try increasing ACEI, but may need another agent on RTO...improved...up and down at home...pt here a year later and needs increase meds 8/20...some better 11/20 despite intol to Norvasc 5 mg=edema; will use another later if BP trends higher... stable 3/21---> is up 6/21, so increase meds if same on RTO.  CKD3 stable...35/1.4 (59%)...60/2.0, not drinking as well, some loose stools, no pains/no hematuria/etc, so will just lose the HCTZ for now...25/1.3...43/1.7 is likely related to being dry, so ok to stay on low dose nsaid=1/2 of 600 mg, but will check sooner...26/1.3 is back to baseline...54%...49%...53%...42% is after not being seen past year as of 8/20 OV...50% is nice to see 11/20...43% is ok for now 3/21---> 34% = still looks dry and I d/w him in detail need to see Renal if no better on RTO; I d/w stop nsaids please.  --  DM well controlled and he's comfortable being on actos...remains well controlled...6.1...5.7 is ok since no lows, no sweats/etc = ditto...same=5.8 with no lows as of 8/20 OV...5.5 and will stop actos now as of 11/20...5.6 off it is great---> 6.1 is fine 6/21.  THYROID stable on 75 qd...stable 9/16 = 2.6...7.2, so increase to 100...1.9 is great...fine 9/18...wnl 4/19---> fine 6/21.  --  CAD with no ischemia...appears stable, but does have some fatigue c/o at 3/18 OV, so will consider SPECT if persists---> no CP/SOB 8/20.  LIPIDS at goal=LDL 78---> 97 is fine 6/21.  --  H/N CA (9/08) s/p laryngectomy and XRT...per Dr Maya q 6 mo.  SKIN CANCER=RUE per Dr Maya; had PET for this and above as of 4/17 OV=neg per report---> seeing Dr Rivera q 2-3 months as of 8/19 OV.  --  GOUT with no recent flare...6.5...6.4---> 6 in 11/20.  VIT B12 DEF=9/18 = 1K OTC to start---> 1500.  --  --  PSA 0.4 on 4/17/17.  COLON not rec given age and no  sx's.  Havrix-Hep A 8/18, Shingles 2019; COVID x1 as of 6/21 OV=Pfizer.  Prevnar rec 9/17; rec Pneumovax if he got prevnar as of 1/19 OV, but he didn't, so go get it now...he can't say 4/19 = we will call; d/w COVID shot needed 3/21.  Flu shot for 2021 season given at his 10/21 office visit.  ([place] scared about being on water alone=trach; Jade/Jamison both here in town).    Follow Up   Return in about 3 months (around 8/10/2022).  Patient was given instructions and counseling regarding his condition or for health maintenance advice. Please see specific information pulled into the AVS if appropriate.

## 2022-05-10 NOTE — ASSESSMENT & PLAN NOTE
TSH is down from 5.9-0.8 as of his 5/22 office visit.  This is without any changes in his dosing since the last level.  Patient stable to continue 100 mcg Synthroid daily.

## 2022-05-10 NOTE — ASSESSMENT & PLAN NOTE
GFR is down from 40 to 30 as of his 5/22 office visit.  Blood pressure is low as well.  BNP is up, but it seems to be related to the drop in his renal function.  Will need follow-up on this in couple of weeks, we will back off on Lasix as well as lisinopril some.

## 2022-05-10 NOTE — ASSESSMENT & PLAN NOTE
Patient is in controlled A. fib, heart rates in the 60s.  He is stable on moderate dose carvedilol along with Eliquis for anticoagulation.

## 2022-05-10 NOTE — ASSESSMENT & PLAN NOTE
BNP is up to 9.1K as of his 5/22 office visit.  But his volume appears stable on exam, no significant edema, sats are fine.  He is having no increased dyspnea.  Believe the BNP elevation is related to bump in his renal function, he appears dry, he is hypotensive.  We will lower his Lasix to once daily for now, also lower his lisinopril to half a pill for now.  Additionally, will lower potassium to once daily.

## 2022-05-10 NOTE — ASSESSMENT & PLAN NOTE
Sats are only 90% on room air today 5/22, but he is running in the 9394 range at home.  He still wears a trach collar at night.  Issues appear stable this regard.

## 2022-07-12 PROBLEM — M75.31: Status: ACTIVE | Noted: 2022-01-01

## 2022-07-12 PROBLEM — M72.0 DUPUYTREN'S DISEASE: Status: ACTIVE | Noted: 2022-01-01

## 2022-07-12 NOTE — PROGRESS NOTES
"Chief Complaint  Pain and Initial Evaluation of the Right Hand     Subjective      Tres Jackson presents to [facility] for evaluation of the right hand. The patient reports his 5th finger is locked. He reports about 3 weeks ago his hand got numb and his 5th finger contracted. He has no other complaints.      No Known Allergies     Social History     Socioeconomic History   • Marital status:    Tobacco Use   • Smoking status: Former Smoker     Packs/day: 2.00     Types: Cigarettes     Quit date:      Years since quittin.5   • Smokeless tobacco: Never Used   Vaping Use   • Vaping Use: Never used   Substance and Sexual Activity   • Alcohol use: Yes     Comment: OCC   • Drug use: Never   • Sexual activity: Defer        Review of Systems     Objective   Vital Signs:   Pulse (!) 41   Ht 165.1 cm (65\")   Wt 70.3 kg (155 lb)   SpO2 92%   BMI 25.79 kg/m²       Physical Exam  Constitutional:       Appearance: Normal appearance. The patient is well-developed and normal weight.   HENT:      Head: Normocephalic.      Right Ear: Hearing and external ear normal.      Left Ear: Hearing and external ear normal.      Nose: Nose normal.   Eyes:      Conjunctiva/sclera: Conjunctivae normal.   Cardiovascular:      Rate and Rhythm: Normal rate.   Pulmonary:      Effort: Pulmonary effort is normal.      Breath sounds: No wheezing or rales.   Abdominal:      Palpations: Abdomen is soft.      Tenderness: There is no abdominal tenderness.   Musculoskeletal:      Cervical back: Normal range of motion.   Skin:     Findings: No rash.   Neurological:      Mental Status: The patient is alert and oriented to person, place, and time.   Psychiatric:         Mood and Affect: Mood and affect normal.         Judgment: Judgment normal.       Ortho Exam      Right hand- Dupuytren's contracture to the right 4th and 5th finger. PIP contracture of 50 degree on pinky 30 degree to 4th finger. DIP and MCP " ROM intact. Good capillary refill. Stiffness with finger ROM. Palpable Dupuytren's nodules to the 4th and 5th fingers.     Procedures      Imaging Results (Most Recent)     None           Result Review :       No results found.           Assessment and Plan     Diagnoses and all orders for this visit:    1. Dupuytren's disease (Primary)      Discussed the treatment plan with the patient.  Plan for a referral to Dr. Garcia for evaluation for the hand.     Call or return if worsening symptoms.    Follow Up     PRN      Patient was given instructions and counseling regarding his condition or for health maintenance advice. Please see specific information pulled into the AVS if appropriate.     Scribed for Franky Reza MD by Michelle Reyes.  07/12/22   11:23 EDT    I have personally performed the services described in this document as scribed by the above individual and it is both accurate and complete. Franky Reza MD 07/13/22

## 2022-07-21 NOTE — PROGRESS NOTES
[facility]    Today's Date: 07/21/22    Patient Name: Tres Jackson  MRN: [id number]  CSN: [id number]  PCP: Erick Pedroza MD, Last PCP Visit: 5/10/2022  Referring Provider: No ref. provider found    SUBJECTIVE     Chief Complaint   Patient presents with   • Left Foot - Nail Problem   • Right Foot - Nail Problem     HPI: Tres Jackson, a 81 y.o.male, presents to clinic for painful toenail and a diabetic foot evaluation.    New, Established, New Problem:  Established  Location:  Toenails  Duration:   Greater than five years  Onset:  Gradual  Nature:  sore with palpation.  Stable, worsening, improving:   Improving  Aggravating factors:  Pain with shoe gear and ambulation.  Previous Treatment:  Treatment    Patient relates no medical changes since their last visit.    Patient denies any fevers, chills, nausea, vomiting, shortness of breath, nor any other constitutional signs nor symptoms.      Past Medical History:   Diagnosis Date   • Atherosclerotic 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 pectoris    • Atrophy of thyroid (acquired)    • Cancer (HCC)     HTM Cancer Dx 2007   • Chronic fatigue, unspecified    • Chronic kidney disease, stage III (moderate) (HCC)    • Dysuria    • Essential (primary) hypertension    • Heart disease    • Hx of radiation therapy     XRT   • Hypothyroidism, unspecified    • Idiopathic gout, unspecified site    • Impaired fasting glucose    • Mixed hyperlipidemia    • Pure hypercholesterolemia    • Type 2 diabetes mellitus with diabetic chronic kidney disease (HCC)      Past Surgical History:   Procedure Laterality Date   • LARYNGECTOMY     • SKIN CANCER EXCISION      ON NOSE, BILATERAL EAR - left ear 3/20   • TUMOR EXCISION      FROM CHEST     Family History   Problem Relation Age of Onset   • No Known Problems Mother    • No Known Problems Father      Social History     Socioeconomic History   • Marital status:    Tobacco Use   •  Smoking status: Former Smoker     Packs/day: 2.00     Types: Cigarettes     Quit date:      Years since quittin.5   • Smokeless tobacco: Never Used   Vaping Use   • Vaping Use: Never used   Substance and Sexual Activity   • Alcohol use: Yes     Comment: OCC   • Drug use: Never   • Sexual activity: Defer     No Known Allergies  Current Outpatient Medications   Medication Sig Dispense Refill   • allopurinol (ZYLOPRIM) 100 MG tablet Take 1 tablet by mouth Daily. 90 tablet 1   • apixaban (ELIQUIS) 2.5 MG tablet tablet Take 1 tablet by mouth Every 12 (Twelve) Hours. Indications: Atrial Fibrillation 180 tablet 1   • atorvastatin (Lipitor) 20 MG tablet Take 1 tablet by mouth Every Night. 90 tablet 1   • carvedilol (COREG) 12.5 MG tablet Take 1 tablet by mouth 2 (Two) Times a Day With Meals. 180 tablet 1   • famotidine (PEPCID) 20 MG tablet Take 1 tablet by mouth Every Night. 90 tablet 1   • furosemide (LASIX) 40 MG tablet Take 1 tablet by mouth Daily. 90 tablet 1   • levothyroxine (SYNTHROID, LEVOTHROID) 100 MCG tablet Take 1 tablet by mouth Daily. 90 tablet 1   • lisinopril (PRINIVIL,ZESTRIL) 20 MG tablet Take 0.5 tablets by mouth Daily. 90 tablet 1   • potassium chloride (MICRO-K) 10 MEQ CR capsule Take 2 capsules by mouth Daily. 180 capsule 1     No current facility-administered medications for this visit.     Review of Systems   Constitutional: Negative.    Skin:        Painful toenails.   All other systems reviewed and are negative.      OBJECTIVE     Vitals:    22 1330   BP: 114/77   Pulse: 71   Temp: 96.7 °F (35.9 °C)   SpO2: 95%       Body mass index is 25.16 kg/m².    Lab Results   Component Value Date    HGBA1C 5.70 (H) 2022       Lab Results   Component Value Date    GLUCOSE 126 (H) 2022    CALCIUM 9.9 2022     2022    K 4.3 2022    CO2 28.2 2022     2022    BUN 41 (H) 2022    CREATININE 2.16 (H) 2022    EGFRIFNONA 43 (L) 2022     BCR 19.0 05/09/2022    ANIONGAP 12.8 05/09/2022       Patient seen in no apparent distress.      PHYSICAL EXAM:     Foot/Ankle Exam:       General:   Appearance: elderly    Appearance comment:  Chronically ill  Orientation: AAOx3    Affect: appropriate    Gait: unimpaired    Shoe Gear:  Casual shoes    VASCULAR      Right Foot Vascularity   Normal vascular exam    Dorsalis pedis:  1+  Posterior tibial:  1+  Skin Temperature: cool    Edema Grading:  None  CFT:  < 3 seconds  Pedal Hair Growth:  Absent  Varicosities: mild varicosities       Left Foot Vascularity   Normal vascular exam    Dorsalis pedis:  1+  Posterior tibial:  1+  Skin Temperature: cool    Edema Grading:  None  CFT:  3  Pedal Hair Growth:  Absent  Varicosities: mild varicosities        NEUROLOGIC     Right Foot Neurologic   Normal sensation    Light touch sensation:  Normal  Vibratory sensation:  Normal  Hot/Cold sensation: normal    Protective Sensation using [place] Monofilament:  10     Left Foot Neurologic   Normal sensation    Light touch sensation:  Normal  Vibratory sensation:  Normal  Hot/cold sensation: normal    Protective Sensation using [place] Monofilament:  10     MUSCLE STRENGTH     Right Foot Muscle Strength   Foot dorsiflexion:  4  Foot plantar flexion:  4  Foot inversion:  4  Foot eversion:  4     Left Foot Muscle Strength   Foot dorsiflexion:  4  Foot plantar flexion:  4  Foot inversion:  4  Foot eversion:  4     RANGE OF MOTION      Right Foot Range of Motion   Foot and ankle ROM within normal limits       Left Foot Range of Motion   Foot and ankle ROM within normal limits       DERMATOLOGIC     Right Foot Dermatologic   Skin: skin intact    Nails: onychomycosis, abnormally thick, subungual debris, dystrophic nails and ingrown toenail    Nails comment:  Toenails 1, 2, 3, 4     Left Foot Dermatologic   Skin: skin intact    Nails: onychomycosis, abnormally thick, subungual debris, dystrophic nails and ingrown  toenail    Nails comment:  Toenails 1, 3, 4, and 5      Diabetic Foot Exam Performed      ASSESSMENT/PLAN     Diagnoses and all orders for this visit:    1. Onychocryptosis (Primary)    2. Foot pain, bilateral    3. Onychomycosis        Comprehensive lower extremity examination and evaluation was performed.    Discussed findings and treatment plan including risks, benefits, and treatment options with patient in detail. Patient agreed with treatment plan.    Medications and allergies reviewed.  Reviewed available blood glucose and HgB A1C lab values along with other pertinent labs.  These were discussed with the patient as to their importance of diabetic maintenance.    Toenails 1, 2, 3, 4 on Right and 1, 3, 4, 5 on Left were debrided with nail nippers then filed with a [employer] nail jeannine.  Patient tolerated procedure well without complications.    An After Visit Summary was printed and given to the patient at discharge, including (if requested) any available informative/educational handouts regarding diagnosis, treatment, or medications. All questions were answered to patient/family satisfaction. Should symptoms fail to improve or worsen they agree to call or return to clinic or to go to the Emergency Department. Discussed the importance of following up with any needed screening tests/labs/specialist appointments and any requested follow-up recommended by me today. Importance of maintaining follow-up discussed and patient accepts that missed appointments can delay diagnosis and potentially lead to worsening of conditions.    Return in about 9 weeks (around 9/22/2022) for Toenail Care., or sooner if acute issues arise.    This document has been electronically signed by Ronan Narayan DPM on July 21, 2022 13:49 EDT

## 2022-07-28 NOTE — TELEPHONE ENCOUNTER
Pharmacy requesting refills on Lisinopril 20 mg 1/2 tablet daily   Pt contacted,  Most likely paronychia. As the area is red and swollen without any fluctuance or area that can be drained would suggest starting on an antibiotic. May need to have the accurate needle removed.   If the symptoms do not improve in the next 24

## 2022-08-01 NOTE — TELEPHONE ENCOUNTER
Patient is needing refills on 8 prescriptions   Lisinopril 20 mg  Atorvastatin 20 mg   Famotidine 20 mg   Furosemide 40 mg   Carvedilol 12.5 mg   Allopurinol 100 mg   Apixaban 2.5 mg  Levothyroxine 100 Mg    [facility]

## 2022-08-11 NOTE — ASSESSMENT & PLAN NOTE
Blood pressure seems to be better regulated as of 8/22, he had some low readings earlier in the year.  Patient is stable to continue with low-dose lisinopril, moderate dose carvedilol as well as diuretic therapy.

## 2022-08-11 NOTE — ASSESSMENT & PLAN NOTE
Patient little tachycardic today 8/22, but does not warrant adjustment to medications just yet.  He is hyperthyroid at this time, that may be contributing to this.  Continue with moderate dose carvedilol along with renally adjusted Eliquis for CVA prevention.

## 2022-08-11 NOTE — ASSESSMENT & PLAN NOTE
GFR is up to 35 as of his 8/22 office visit, electrolytes are stable as noted.  We will continue to monitor closely.

## 2022-08-11 NOTE — PATIENT INSTRUCTIONS
1.  Stop the 100 mcg dose of levothyroxine.    2.  Do not take any thyroid medication for the next week.    3.  I sent a prescription to [facility] for 75 mcg Synthroid.  Go ahead and take this once daily when it shows up.

## 2022-08-11 NOTE — ASSESSMENT & PLAN NOTE
Patient has lost more weight and his TSH subsequently dropped significantly to 0.01 as of 8/22 office visit.  Discussed with patient we need to hold his Synthroid for a week probably, and then resume it at 75 mcg daily.

## 2022-08-11 NOTE — PROGRESS NOTES
Chief Complaint  Hypertension (Pt is here for routine follow up. Pt reports no new issues or concerns. 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  A/C diastolic heart failure.  BNP down from 17 K to 4 K at d/c.  2.  Persistent A. fib.  Switch from atenolol to Coreg, Eliquis resumed.  3.  A/C Hypoxic Resp Failure. Overnight results are worrisome for possible DORA.  Pt desaturated on room air and was placed on 28% aerosol trach collar at 2358. Despite oxygen use, Mr Jackson experienced 67 desaturations throughout the study totaling 2 hours and 8 min.  The deepest desaturation value was 78%.  RT CM reported the findings to Dr Duffy and TINA Briones, and referral to sleep lab was made.  Home oxygen orders placed.  Resting oxygen requirements are 28%, ambulating requirements are 31%, sleep requirements are yet to be determined.  4.  R Pleural Effusion. s/p tap 2/2 and had 1.9 L removed; it was negative for any obvious infection and for malignancy---> bedside ultrasound performed by Dr. Duffy just prior to discharge revealed at least partial reaccumulation of the fluid.  We will continue with attempts at aggressive diuresis, and have him follow-up with pulmonary as an outpatient.  5.  Hypertension. = no room to titrate beta-blocker and will defer Entresto to cardiology = we will get him into see Dr. Guzman.    ---> here 8/22 for a routine 3-month follow-up.    Review of Systems   Constitutional: Negative for appetite change, fatigue and fever.   HENT: Negative for congestion and ear pain.    Eyes: Negative for blurred vision.   Respiratory:  "Negative for cough, chest tightness, shortness of breath and wheezing.    Cardiovascular: Negative for chest pain, palpitations and leg swelling.   Gastrointestinal: Negative for abdominal pain.   Genitourinary: Negative for difficulty urinating, dysuria and hematuria.   Musculoskeletal: Negative for arthralgias and gait problem.   Skin: Negative for skin lesions.   Neurological: Negative for syncope, memory problem and confusion.   Psychiatric/Behavioral: Negative for self-injury and depressed mood.       Objective   Vital Signs:   /84   Pulse 109   Temp 97.9 °F (36.6 °C)   Ht 165.1 cm (65\")   Wt 66.4 kg (146 lb 6.4 oz)   SpO2 (!) 79%   BMI 24.36 kg/m²           Physical Exam  Vitals and nursing note reviewed.   Constitutional:       General: He is not in acute distress.     Appearance: Normal appearance. He is not toxic-appearing.   HENT:      Head: Atraumatic.      Right Ear: External ear normal.      Left Ear: External ear normal.      Nose: Nose normal.      Mouth/Throat:      Mouth: Mucous membranes are moist.   Eyes:      General:         Right eye: No discharge.         Left eye: No discharge.      Extraocular Movements: Extraocular movements intact.      Pupils: Pupils are equal, round, and reactive to light.   Neck:      Comments: Chronic trach.  Cardiovascular:      Rate and Rhythm: Normal rate. Rhythm irregular.      Pulses: Normal pulses.      Heart sounds: Normal heart sounds. No murmur heard.    No gallop.      Comments: Irregularly irregular, no S3.  Pulmonary:      Effort: Pulmonary effort is normal. No respiratory distress.      Breath sounds: No wheezing, rhonchi or rales.      Comments: Patient with decreased breath sounds on the right, dullness about USP up.  No labored respirations.---> Significantly improved breath sounds noted bilaterally as of 3/22.  Abdominal:      General: There is no distension.      Palpations: Abdomen is soft. There is no mass.      Tenderness: There is " no abdominal tenderness. There is no guarding.   Musculoskeletal:         General: No swelling or tenderness.      Cervical back: No tenderness.      Right lower leg: Edema present.      Left lower leg: Edema present.      Comments: Patient with 2+ edema on the left, 1+ on the right, extending to about mid calf.---> Patient with trace edema only as of 3/22 office visit.   Skin:     General: Skin is warm and dry.      Findings: No rash.   Neurological:      General: No focal deficit present.      Mental Status: He is alert and oriented to person, place, and time. Mental status is at baseline.      Motor: No weakness.      Gait: Gait normal.   Psychiatric:         Mood and Affect: Mood normal.         Thought Content: Thought content normal.          Result Review :   The following data was reviewed by: Erick Pedroza MD on 09/03/2021:           Assessment and Plan    Diagnoses and all orders for this visit:    1. Acute diastolic CHF (congestive heart failure) (HCC) (Primary)  Overview:  This is a new issue as of his September 2021 appointment.  Echo 10/21 with normal EF, no concerning valve changes,   he does have significant pulmonary hypertension.      Assessment & Plan:  Weight is down 9 pounds as of his 8/22 office visit, this is over the past 3 months.  Additionally his BNP is down from 9K to 7K.  Renal function is stable, electrolytes are good.  Patient stable to continue with once daily Lasix and potassium.    Orders:  -     BNP; Future    2. Acute on chronic respiratory failure with hypoxia (HCC)  Assessment & Plan:  Sats are lower today in the office 8/22, but the patient is in no distress.  He says he gets sats in the mid 90s at home on average.  He still uses trach collar at night and that certainly appropriate.      3. Essential hypertension  Assessment & Plan:  Blood pressure seems to be better regulated as of 8/22, he had some low readings earlier in the year.  Patient is stable to continue with low-dose  lisinopril, moderate dose carvedilol as well as diuretic therapy.      4. Persistent atrial fibrillation (HCC)  Overview:  This appears to be a new issue = yes, EKG done today 9/3/2021 to evaluate the patient's irregular heartbeat noted on exam confirms this.     Assessment & Plan:  Patient little tachycardic today 8/22, but does not warrant adjustment to medications just yet.  He is hyperthyroid at this time, that may be contributing to this.  Continue with moderate dose carvedilol along with renally adjusted Eliquis for CVA prevention.      5. Stage 3b chronic kidney disease (HCC)  Assessment & Plan:  GFR is up to 35 as of his 8/22 office visit, electrolytes are stable as noted.  We will continue to monitor closely.    Orders:  -     Basic Metabolic Panel; Future  -     CBC & Differential; Future    6. Hypothyroidism due to acquired atrophy of thyroid  Assessment & Plan:  Patient has lost more weight and his TSH subsequently dropped significantly to 0.01 as of 8/22 office visit.  Discussed with patient we need to hold his Synthroid for a week probably, and then resume it at 75 mcg daily.    Orders:  -     levothyroxine (SYNTHROID, LEVOTHROID) 75 MCG tablet; Take 1 tablet by mouth Daily.  Dispense: 90 tablet; Refill: 1  -     TSH; Future  -     T4, free; Future    7. Mixed hyperlipidemia  Assessment & Plan:  LDL is down sharply from 120 to 40 as of his 8/22 office visit.  This was after transition from pravastatin to moderate dose atorvastatin.  Continue same.      --  --  OLDER NOTES:  (D/W HIM ON RTO GERD/STOMACH ISSUES THAT WIFE D/W ME ON MONDAY---> I d/w him 3/21 and he denies it; will repeat CBC on RTO)  --I have called pharmacies in town and they have no record of Prevnar, I have called pt's wife and she feels that he has not received it yet. MB---> I will address this after Covid shots done.    VISIT 6/21:  ANNUAL MEDICARE WELLNESS PHYSICAL 8/20 OV=forms reviewed in room with pt; no new issues  raised.  --  HTN well controlled...but f/u off HCTZ...as above; will try increasing ACEI, but may need another agent on RTO...improved...up and down at home...pt here a year later and needs increase meds 8/20...some better 11/20 despite intol to Norvasc 5 mg=edema; will use another later if BP trends higher... stable 3/21---> is up 6/21, so increase meds if same on RTO.  CKD3 stable...35/1.4 (59%)...60/2.0, not drinking as well, some loose stools, no pains/no hematuria/etc, so will just lose the HCTZ for now...25/1.3...43/1.7 is likely related to being dry, so ok to stay on low dose nsaid=1/2 of 600 mg, but will check sooner...26/1.3 is back to baseline...54%...49%...53%...42% is after not being seen past year as of 8/20 OV...50% is nice to see 11/20...43% is ok for now 3/21---> 34% = still looks dry and I d/w him in detail need to see Renal if no better on RTO; I d/w stop nsaids please.  --  DM well controlled and he's comfortable being on actos...remains well controlled...6.1...5.7 is ok since no lows, no sweats/etc = ditto...same=5.8 with no lows as of 8/20 OV...5.5 and will stop actos now as of 11/20...5.6 off it is great---> 6.1 is fine 6/21.  THYROID stable on 75 qd...stable 9/16 = 2.6...7.2, so increase to 100...1.9 is great...fine 9/18...wnl 4/19---> fine 6/21.  --  CAD with no ischemia...appears stable, but does have some fatigue c/o at 3/18 OV, so will consider SPECT if persists---> no CP/SOB 8/20.  LIPIDS at goal=LDL 78---> 97 is fine 6/21.  --  H/N CA (9/08) s/p laryngectomy and XRT...per Dr Maya q 6 mo.  SKIN CANCER=RUE per Dr Maya; had PET for this and above as of 4/17 OV=neg per report---> seeing Dr Rivera q 2-3 months as of 8/19 OV.  --  GOUT with no recent flare...6.5...6.4---> 6 in 11/20.  VIT B12 DEF=9/18 = 1K OTC to start---> 1500.  --  --  PSA 0.4 on 4/17/17.  COLON not rec given age and no sx's.  Havrix-Hep A 8/18, Shingles 2019; COVID x1 as of 6/21 OV=[employer].  Prevnar rec 9/17; rec Pneumovax if  he got prevnar as of 1/19 OV, but he didn't, so go get it now...he can't say 4/19 = we will call; d/w COVID shot needed 3/21.  Flu shot for 2021 season given at his 10/21 office visit.  ([place] scared about being on water alone=trach; Jade/Jamison both here in town).    Follow Up   Return in about 3 months (around 11/11/2022).  Patient was given instructions and counseling regarding his condition or for health maintenance advice. Please see specific information pulled into the AVS if appropriate.

## 2022-08-11 NOTE — ASSESSMENT & PLAN NOTE
Sats are lower today in the office 8/22, but the patient is in no distress.  He says he gets sats in the mid 90s at home on average.  He still uses trach collar at night and that certainly appropriate.

## 2022-08-11 NOTE — ASSESSMENT & PLAN NOTE
Weight is down 9 pounds as of his 8/22 office visit, this is over the past 3 months.  Additionally his BNP is down from 9K to 7K.  Renal function is stable, electrolytes are good.  Patient stable to continue with once daily Lasix and potassium.

## 2022-08-11 NOTE — ASSESSMENT & PLAN NOTE
LDL is down sharply from 120 to 40 as of his 8/22 office visit.  This was after transition from pravastatin to moderate dose atorvastatin.  Continue same.

## 2022-08-18 NOTE — ED PROVIDER NOTES
Time: 11:43 AM EDT  Arrived by: ambulance  Chief Complaint: head injury due to fall  History provided by: patient, EMS, relative (son), medical records  History is limited by: N/A    History of Present Illness:  Patient is a 81 y.o. year old male who presents to the emergency department with head injury due to fall.    Patient arrives to ED via EMS. Patient's relative (son) is at bedside and helps to report clinical history. Patient has a medical history of essential hypertension, CAD, CHF, type 2 diabetes, CKD stage III, mixed hyperlipidemia, hypothyroidism, heart disease on anticoagulant therapy (Eliquis), persistent atrial fibrillation, acute on chronic respiratory failure. Patient is a former smoker, current alcohol user, with no history of drug use.    Per EMS, patient's family reported patient had been feeling weak for five days, with decreased appetite and fluid intake. Patient slipped on water in the bathroom and hit his head. Patient relies on a voice assistive device, for communication due to prior tumor removal on anterior neck a long time ago. Patients son reported patient started experiencing weakness about 2 days ago and 1 day ago started having symptoms of nausea and vomiting. Patient also confirms symptoms of low back pain, diarrhea, but denies cough, fever, chills.      History provided by:  Patient, EMS personnel, relative and medical records   used: No        Patient Care Team  Primary Care Provider: Erick Pedroza MD    Past Medical History:     No Known Allergies  Past Medical History:   Diagnosis Date   • Atherosclerotic 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 pectoris    • Atrophy of thyroid (acquired)    • Cancer (HCC)     HTM Cancer Dx 2007   • Chronic fatigue, unspecified    • Chronic kidney disease, stage III (moderate) (HCC)    • Dysuria    • Essential (primary) hypertension    • Heart disease    • Hx of radiation therapy     XRT   • Hypothyroidism,  unspecified    • Idiopathic gout, unspecified site    • Impaired fasting glucose    • Mixed hyperlipidemia    • Pure hypercholesterolemia    • Type 2 diabetes mellitus with diabetic chronic kidney disease (HCC)      Past Surgical History:   Procedure Laterality Date   • LARYNGECTOMY     • SKIN CANCER EXCISION      ON NOSE, BILATERAL EAR - left ear 3/20   • TUMOR EXCISION      FROM CHEST     Family History   Problem Relation Age of Onset   • No Known Problems Mother    • No Known Problems Father        Home Medications:  Prior to Admission medications    Medication Sig Start Date End Date Taking? Authorizing Provider   allopurinol (ZYLOPRIM) 100 MG tablet Take 1 tablet by mouth Daily. 8/3/22   Erick Pedroza MD   apixaban (ELIQUIS) 2.5 MG tablet tablet Take 1 tablet by mouth Every 12 (Twelve) Hours. Indications: Atrial Fibrillation 8/3/22   Erick Pedroza MD   atorvastatin (Lipitor) 20 MG tablet Take 1 tablet by mouth Every Night. 8/3/22   Erick Pedroza MD   carvedilol (COREG) 12.5 MG tablet Take 1 tablet by mouth 2 (Two) Times a Day With Meals. 8/3/22   Erick Pedroza MD   famotidine (PEPCID) 20 MG tablet Take 1 tablet by mouth Every Night. 8/3/22   Erick Pedroza MD   furosemide (LASIX) 40 MG tablet Take 1 tablet by mouth Daily. 8/3/22   Erick Pedroza MD   levothyroxine (SYNTHROID, LEVOTHROID) 75 MCG tablet Take 1 tablet by mouth Daily. 22   Erick Pedroza MD   lisinopril (PRINIVIL,ZESTRIL) 10 MG tablet Take 1 tablet by mouth Daily. 8/3/22   Erick Pedroza MD   potassium chloride (MICRO-K) 10 MEQ CR capsule Take 2 capsules by mouth Daily. 5/10/22   Erick Pedroza MD        Social History:   Social History     Tobacco Use   • Smoking status: Former Smoker     Packs/day: 2.00     Types: Cigarettes     Quit date:      Years since quittin.6   • Smokeless tobacco: Never Used   Vaping Use   • Vaping Use: Never used   Substance Use Topics   • Alcohol use: Yes     Comment: OCC   • Drug use: Never     Recent  "travel: not applicable    Review of Systems:  Review of Systems   Constitutional: Positive for appetite change (decreased food and fluid intake). Negative for chills and fever.   HENT: Negative for congestion, rhinorrhea and sore throat.         Positive head injury due to fall   Eyes: Negative for pain and visual disturbance.   Respiratory: Negative for apnea, cough, chest tightness and shortness of breath.    Cardiovascular: Negative for chest pain and palpitations.   Gastrointestinal: Positive for diarrhea, nausea and vomiting. Negative for abdominal pain.   Genitourinary: Negative for difficulty urinating and dysuria.   Musculoskeletal: Positive for back pain (low). Negative for joint swelling and myalgias.   Skin: Negative for color change.   Neurological: Positive for weakness. Negative for seizures and headaches.   Psychiatric/Behavioral: Negative.    All other systems reviewed and are negative.       Physical Exam:  /83   Pulse 84   Temp 98.9 °F (37.2 °C) (Oral)   Resp 18   Ht 165.1 cm (65\")   Wt 64.2 kg (141 lb 8.6 oz)   SpO2 96%   BMI 23.55 kg/m²     Physical Exam  Vitals and nursing note reviewed.   Constitutional:       General: He is not in acute distress.     Appearance: Normal appearance. He is not toxic-appearing.   HENT:      Head: Normocephalic and atraumatic.      Jaw: There is normal jaw occlusion.   Eyes:      General: Lids are normal.      Extraocular Movements: Extraocular movements intact.      Conjunctiva/sclera: Conjunctivae normal.      Pupils: Pupils are equal, round, and reactive to light.   Cardiovascular:      Rate and Rhythm: Normal rate and regular rhythm.      Pulses: Normal pulses.      Heart sounds: Normal heart sounds.   Pulmonary:      Effort: Pulmonary effort is normal. No respiratory distress.      Breath sounds: Normal breath sounds. No wheezing or rhonchi.   Abdominal:      General: Abdomen is flat.      Palpations: Abdomen is soft.      Tenderness: There is " abdominal tenderness in the left lower quadrant. There is no guarding or rebound.   Musculoskeletal:         General: Normal range of motion.      Cervical back: Normal range of motion and neck supple.      Right lower leg: No edema.      Left lower leg: No edema.   Skin:     General: Skin is warm and dry.   Neurological:      Mental Status: He is alert and oriented to person, place, and time. Mental status is at baseline.   Psychiatric:         Mood and Affect: Mood normal.                Medications in the Emergency Department:  Medications   sodium chloride 0.9 % bolus 1,000 mL (1,000 mL Intravenous New Bag 8/18/22 1228)   ondansetron (ZOFRAN) injection 4 mg (4 mg Intravenous Given 8/18/22 1227)   iopamidol (ISOVUE-370) 76 % injection 100 mL (100 mL Intravenous Given 8/18/22 1327)        Labs  Lab Results (last 24 hours)     Procedure Component Value Units Date/Time    CBC & Differential [[id number]]  (Abnormal) Collected: 08/18/22 1226    Specimen: Blood Updated: 08/18/22 1300    Narrative:      The following orders were created for panel order CBC & Differential.  Procedure                               Abnormality         Status                     ---------                               -----------         ------                     CBC Auto Differential[[id number]]        Abnormal            Final result               Scan Slide[[id number]]                                                                    Please view results for these tests on the individual orders.    Comprehensive Metabolic Panel [[id number]]  (Abnormal) Collected: 08/18/22 1226    Specimen: Blood Updated: 08/18/22 1258     Glucose 164 mg/dL      BUN 47 mg/dL      Creatinine 1.61 mg/dL      Sodium 145 mmol/L      Potassium 4.2 mmol/L      Comment: Slight hemolysis detected by analyzer. Results may be affected.        Chloride 98 mmol/L      CO2 30.7 mmol/L      Calcium 9.5 mg/dL      Total Protein 7.2 g/dL      Albumin 3.90 g/dL      ALT  (SGPT) 9 U/L      AST (SGOT) 40 U/L      Comment: Slight hemolysis detected by analyzer. Results may be affected.        Alkaline Phosphatase 97 U/L      Total Bilirubin 1.1 mg/dL      Globulin 3.3 gm/dL      A/G Ratio 1.2 g/dL      BUN/Creatinine Ratio 29.2     Anion Gap 16.3 mmol/L      eGFR 43.0 mL/min/1.73      Comment: National Kidney Foundation and American Society of Nephrology (ASN) Task Force recommended calculation based on the Chronic Kidney Disease Epidemiology Collaboration (CKD-EPI) equation refit without adjustment for race.       Narrative:      GFR Normal >60  Chronic Kidney Disease <60  Kidney Failure <15      Magnesium [[id number]]  (Normal) Collected: 08/18/22 1226    Specimen: Blood Updated: 08/18/22 1253     Magnesium 2.2 mg/dL     CBC Auto Differential [[id number]]  (Abnormal) Collected: 08/18/22 1226    Specimen: Blood Updated: 08/18/22 1300     WBC 5.93 10*3/mm3      RBC 4.41 10*6/mm3      Hemoglobin 13.3 g/dL      Hematocrit 40.4 %      MCV 91.6 fL      MCH 30.2 pg      MCHC 32.9 g/dL      RDW 14.2 %      RDW-SD 47.7 fl      MPV 11.5 fL      Platelets 106 10*3/mm3      Neutrophil % 73.2 %      Lymphocyte % 19.2 %      Monocyte % 6.9 %      Eosinophil % 0.0 %      Basophil % 0.2 %      Immature Grans % 0.5 %      Neutrophils, Absolute 4.34 10*3/mm3      Lymphocytes, Absolute 1.14 10*3/mm3      Monocytes, Absolute 0.41 10*3/mm3      Eosinophils, Absolute 0.00 10*3/mm3      Basophils, Absolute 0.01 10*3/mm3      Immature Grans, Absolute 0.03 10*3/mm3      nRBC 0.0 /100 WBC     Urinalysis With Culture If Indicated - Urine, Clean Catch [[id number]]  (Abnormal) Collected: 08/18/22 1506    Specimen: Urine, Clean Catch Updated: 08/18/22 1517     Color, UA Yellow     Appearance, UA Clear     pH, UA 6.5     Specific Gravity, UA >1.030     Glucose, UA Negative     Ketones, UA Trace     Bilirubin, UA Negative     Blood, UA Negative     Protein, UA 30 mg/dL (1+)     Leuk Esterase, UA Negative     Nitrite,  UA Negative     Urobilinogen, UA 1.0 E.U./dL    Narrative:      In absence of clinical symptoms, the presence of pyuria, bacteria, and/or nitrites on the urinalysis result does not correlate with infection.    Urinalysis, Microscopic Only - Urine, Clean Catch [[id number]]  (Abnormal) Collected: 08/18/22 1506    Specimen: Urine, Clean Catch Updated: 08/18/22 1517     RBC, UA 0-2 /HPF      WBC, UA 0-2 /HPF      Bacteria, UA None Seen /HPF      Squamous Epithelial Cells, UA 0-2 /HPF      Hyaline Casts, UA 0-2 /LPF      Methodology Automated Microscopy    Urine Culture - Urine, Urine, Clean Catch [[id number]] Collected: 08/18/22 1506    Specimen: Urine, Clean Catch Updated: 08/18/22 1515           Imaging:  CT Abdomen Pelvis With Contrast    Result Date: 8/18/2022  PROCEDURE: CT ABDOMEN PELVIS W CONTRAST  COMPARISON: [facility], CR, XR CHEST PA AND LATERAL, 2/24/2022, 15:55.  [facility], CR, XR CHEST 1 VW, 8/18/2022, 12:23.  INDICATIONS: GENERALIZED ABDOMEN PAIN  TECHNIQUE: After obtaining the patient's consent, CT images were created with non-ionic intravenous contrast material.   PROTOCOL:   Standard imaging protocol performed    RADIATION:   DLP: 626.4mGy*cm   Automated exposure control was utilized to minimize radiation dose. CONTRAST: 100cc Isovue 370 I.V.  FINDINGS:  There is a moderate right pleural effusion with overlying consolidation of the majority of the right lower lobe.  There is a portion of the right middle lobe also consolidated.  This may be due to pneumonia or atelectasis.  There is a trace left pleural effusion with similar overlying lower lobe consolidation.  Cardiomegaly is present.  Extensive coronary artery calcification is present.  There is a trace pericardial effusion.  Gynecomastia is incidentally noted.  The spleen, pancreas have a grossly normal appearance.  There is a 1.2 centimeter low-density right and 1.3 centimeter low-density left adrenal nodule.   These may be due to adenomata.  There is relative hypodensity throughout the liver suggesting steatosis.  Multiple calcified gallstones are noted within the gallbladder.  There is no evidence of bowel obstruction, free air or free fluid.  There is widespread colonic diverticulosis without findings to suggest diverticulitis.  The appendix is normal.  The urinary bladder is distended but otherwise grossly unremarkable.  There are bilateral renal hypodensities probably due to cysts.  The kidneys excrete symmetrically.  No collecting system filling defects are evident.  There is widespread atherosclerotic vascular calcification.  The celiac trunk, SMA are grossly patent.  There are multilevel degenerative changes in the spine.  There is a burst type compression fracture deformity of what appears to be the L1 vertebral body.  There are fracture clefts present suggesting this is an acute fracture.  There is bony retropulsion of approximately 8 millimeters.  MRI can be of value in determining acuity of spinal compression fractures.  There is a fat containing paraumbilical hernia.         1. Moderate right and small left pleural effusions with overlying pulmonary consolidation either due to atelectasis or pneumonia.  2. Marked cardiomegaly.  There is dilatation of the pulmonary arteries also present suggesting pulmonary hypertension.  3. Colonic diverticulosis without findings to suggest diverticulitis.  4. Burst type fracture deformity of what appears to be the L1 vertebral body, with retropulsion of approximately 8 millimeters.  This likely causes significant central canal stenosis.  There are fracture clefts suggesting this is an acute or early subacute fracture.  MRI may be of value in determining acuity, if clinically warranted.  5. Cholelithiasis.  6. Additional findings as given above.      JOEY CALVERT MD       Electronically Signed and Approved By: JOEY CALVERT MD on 8/18/2022 at 13:45             XR Chest 1  View    Result Date: 8/18/2022  PROCEDURE: XR CHEST 1 VW  COMPARISON: [facility], CR, XR CHEST 1 VW, 3/16/2022, 12:49.  INDICATIONS: COUGH  FINDINGS:  The heart remains slightly enlarged.  The pulmonary vascularity does not appear congested.  Small right pleural effusion is evident.  Subsegmental atelectasis is seen at the lung bases.  Bony structures appear intact.        Cardiomegaly, stable.  Small right pleural effusion is evident.  Subsegmental atelectasis is seen at the lung bases.       NIMO DONALDSON MD       Electronically Signed and Approved By: NIMO DONALDSON MD on 8/18/2022 at 13:09               Procedures:  Procedures    Progress                            Medical Decision Making:  MDM  Number of Diagnoses or Management Options  Closed fracture of lumbar vertebra with spinal cord injury, initial encounter (HCC)  Diagnosis management comments: The patient´s CBC was reviewed and shows no abnormalities of critical concern. Of note, there is no anemia requiring a blood transfusion and the platelet count is acceptable.  The patient´s CMP was reviewed and shows no abnormalities of critical concern. Of note, the patient´s sodium and potassium are acceptable. The patient´s liver enzymes are unremarkable. The patient´s renal function (creatinine) is slightly elevated at 1.61. The patient has a normal anion gap.  Urinalysis is negative for bacteriuria.  CT scan of the abdomen pelvis shows a burst fracture of the L1 vertebral body with retropulsion.  They state that it is also causing significant central canal stenosis.  Case was discussed with [facility] who agrees with transfer.       Amount and/or Complexity of Data Reviewed  Clinical lab tests: reviewed  Tests in the radiology section of CPT®: reviewed  Tests in the medicine section of CPT®: reviewed  Discussion of test results with the performing providers: yes  Decide to obtain previous medical records or to obtain history from someone other than  the patient: yes  Review and summarize past medical records: yes  Discuss the patient with other providers: yes  Independent visualization of images, tracings, or specimens: yes    Risk of Complications, Morbidity, and/or Mortality  Presenting problems: moderate  Management options: moderate    Patient Progress  Patient progress: stable       Final diagnoses:   Closed fracture of lumbar vertebra with spinal cord injury, initial encounter (HCC)        Disposition:  ED Disposition     ED Disposition   Transfer to Another Facility     Condition   --    Comment   --             This medical record created using voice recognition software and a virtual scribe.    Documentation assistance provided by Michelle Snyder acting as scribe for Dr. Alejandro Castillo MD. Information recorded by the scribe was done at my direction and has been verified and validated by me.       Michelle Snyder  08/18/22 1304       Michelle Snyder  08/18/22 1306       Alejandro Castillo MD  08/18/22 [id number]

## 2023-04-20 NOTE — TELEPHONE ENCOUNTER
Caller: [facility]    Relationship: Home Health    Best call back number: [phone]    Requested Prescriptions:   Requested Prescriptions     Pending Prescriptions Disp Refills   • Synthroid 100 MCG tablet       Sig: Take 1 tablet by mouth Daily.        Pharmacy where request should be sent: [facility] [address] [place] [address], [place] - [address] AT [facility] [address] - [phone] [facility] [phone] FX     Additional details provided by patient: HOME HEALTH REQUESTING A WEEK SUPPLY AS PATIENT WAITS 5 BUSINESS DAYS FOR HOME DELIVERY OF SYNTHROID.     Does the patient have less than a 3 day supply:  [x] Yes  [] No    Roberto Jara Rep   02/16/22 09:49 EST             
present